# Patient Record
Sex: MALE | Race: WHITE | Employment: UNEMPLOYED | URBAN - METROPOLITAN AREA
[De-identification: names, ages, dates, MRNs, and addresses within clinical notes are randomized per-mention and may not be internally consistent; named-entity substitution may affect disease eponyms.]

---

## 2017-02-03 ENCOUNTER — IP HISTORICAL/CONVERTED ENCOUNTER (OUTPATIENT)
Dept: OTHER | Age: 22
End: 2017-02-03

## 2017-03-14 ENCOUNTER — IP HISTORICAL/CONVERTED ENCOUNTER (OUTPATIENT)
Dept: OTHER | Age: 22
End: 2017-03-14

## 2017-06-08 ENCOUNTER — OP HISTORICAL/CONVERTED ENCOUNTER (OUTPATIENT)
Dept: OTHER | Age: 22
End: 2017-06-08

## 2017-10-31 ENCOUNTER — OFFICE VISIT (OUTPATIENT)
Dept: INTERNAL MEDICINE CLINIC | Age: 22
End: 2017-10-31

## 2017-10-31 VITALS
HEART RATE: 94 BPM | WEIGHT: 164.4 LBS | BODY MASS INDEX: 24.35 KG/M2 | HEIGHT: 69 IN | DIASTOLIC BLOOD PRESSURE: 84 MMHG | TEMPERATURE: 98.8 F | OXYGEN SATURATION: 94 % | SYSTOLIC BLOOD PRESSURE: 127 MMHG | RESPIRATION RATE: 20 BRPM

## 2017-10-31 DIAGNOSIS — J02.9 SORE THROAT: Primary | ICD-10-CM

## 2017-10-31 DIAGNOSIS — J40 BRONCHITIS: ICD-10-CM

## 2017-10-31 DIAGNOSIS — Z87.891 EX-SMOKER FOR MORE THAN 1 YEAR: ICD-10-CM

## 2017-10-31 DIAGNOSIS — F20.9 SCHIZOPHRENIA, UNSPECIFIED TYPE (HCC): ICD-10-CM

## 2017-10-31 RX ORDER — AZITHROMYCIN 250 MG/1
TABLET, FILM COATED ORAL
Qty: 6 TAB | Refills: 0 | Status: SHIPPED | OUTPATIENT
Start: 2017-10-31 | End: 2017-11-05

## 2017-10-31 RX ORDER — BENZONATATE 200 MG/1
200 CAPSULE ORAL
Qty: 30 CAP | Refills: 0 | Status: SHIPPED | OUTPATIENT
Start: 2017-10-31 | End: 2017-11-07

## 2017-10-31 NOTE — PROGRESS NOTES
HISTORY OF PRESENT ILLNESS  Saundra Trevino is a 25 y.o. male group home patient presents with care giver for problem visit  HPI  He reports cough for a \"a couple months\" yellow sputum, sore throat    No asthma inhaler needed since 11years old. Denies wheezing    No history of pneumonia    Stopped smoking earlier this month, was 1 ppd since 12years old    Schizophrenia managed by  Dr. Tee Ewing, last visit 3 weeks ago. Admitted to [facility] in July    heis anxious today because he will be leaving to spend holidays with mother in [place]    Past Medical History:   Diagnosis Date    Asthma     Seizures ([address]     last seizure 2001       No current outpatient prescriptions on file prior to visit. No current facility-administered medications on file prior to visit.    medications not yet entered     Review of Systems   Constitutional: Negative for chills and fever. HENT: Positive for sore throat. Eyes: Negative. Respiratory: Positive for cough and sputum production. Negative for hemoptysis, shortness of breath and wheezing. Cardiovascular: Negative. Gastrointestinal: Negative. Genitourinary: Negative. Musculoskeletal: Negative for myalgias. Physical Exam   Constitutional: He appears well-developed and well-nourished. No distress. HENT:   Right Ear: External ear normal.   Left Ear: External ear normal.   Mouth/Throat: Oropharynx is clear and moist. No oropharyngeal exudate. Eyes: Conjunctivae are normal. Right eye exhibits no discharge. Left eye exhibits no discharge. No scleral icterus. Neck: Normal range of motion. Neck supple. Cardiovascular: Normal rate and regular rhythm. Pulmonary/Chest: Effort normal. No respiratory distress. He has no decreased breath sounds. He has no wheezes. He has rhonchi in the right upper field, the right middle field and the right lower field. He has no rales. He exhibits no tenderness.    Lymphadenopathy:     He has no cervical adenopathy. Skin: He is not diaphoretic. Psychiatric: Judgment and thought content normal. His mood appears anxious. His affect is angry and inappropriate. His speech is rapid and/or pressured. He is aggressive. Cognition and memory are normal.       ASSESSMENT and PLAN    ICD-10-CM ICD-9-CM    1. Sore throat J02.9 462 AMB POC RAPID STREP A      azithromycin (ZITHROMAX) 250 mg tablet   2. Bronchitis J40 490 azithromycin (ZITHROMAX) 250 mg tablet      benzonatate (TESSALON) 200 mg capsule      XR CHEST PA LAT   3. Schizophrenia, unspecified type ([facility] F20.9 295.90    4. Ex-smoker for more than 1 year Z87.891 V15.82 XR CHEST PA LAT     Follow-up Disposition:  Return in about 2 weeks (around 11/14/2017) for bronchitis, sore thorat. reviewed medications and side effects in detail      1. Patient declined rapid strep, encouraged to gargle    2.  Encouraged to increase hydration

## 2017-10-31 NOTE — PATIENT INSTRUCTIONS
Bronchitis: Care Instructions  Your Care Instructions    Bronchitis is inflammation of the bronchial tubes, which carry air to the lungs. The tubes swell and produce mucus, or phlegm. The mucus and inflamed bronchial tubes make you cough. You may have trouble breathing. Most cases of bronchitis are caused by viruses like those that cause colds. Antibiotics usually do not help and they may be harmful. Bronchitis usually develops rapidly and lasts about 2 to 3 weeks in otherwise healthy people. Follow-up care is a key part of your treatment and safety. Be sure to make and go to all appointments, and call your doctor if you are having problems. It's also a good idea to know your test results and keep a list of the medicines you take. How can you care for yourself at home? · Take all medicines exactly as prescribed. Call your doctor if you think you are having a problem with your medicine. · Get some extra rest.  · Take an over-the-counter pain medicine, such as acetaminophen (Tylenol), ibuprofen (Advil, Motrin), or naproxen (Aleve) to reduce fever and relieve body aches. Read and follow all instructions on the label. · Do not take two or more pain medicines at the same time unless the doctor told you to. Many pain medicines have acetaminophen, which is Tylenol. Too much acetaminophen (Tylenol) can be harmful. · Take an over-the-counter cough medicine that contains dextromethorphan to help quiet a dry, hacking cough so that you can sleep. Avoid cough medicines that have more than one active ingredient. Read and follow all instructions on the label. · Breathe moist air from a humidifier, hot shower, or sink filled with hot water. The heat and moisture will thin mucus so you can cough it out. · Do not smoke. Smoking can make bronchitis worse. If you need help quitting, talk to your doctor about stop-smoking programs and medicines. These can increase your chances of quitting for good.   When should you call for help? Call 911 anytime you think you may need emergency care. For example, call if:  ? · You have severe trouble breathing. ?Call your doctor now or seek immediate medical care if:  ? · You have new or worse trouble breathing. ? · You cough up dark brown or bloody mucus (sputum). ? · You have a new or higher fever. ? · You have a new rash. ? Watch closely for changes in your health, and be sure to contact your doctor if:  ? · You cough more deeply or more often, especially if you notice more mucus or a change in the color of your mucus. ? · You are not getting better as expected. Where can you learn more? Go to http://genna-chester.info/. Enter H333 in the search box to learn more about \"Bronchitis: Care Instructions. \"  Current as of: May 12, 2017  Content Version: 11.4  © 3759-6872 [employer]. Care instructions adapted under license by [employer] (which disclaims liability or warranty for this information). If you have questions about a medical condition or this instruction, always ask your healthcare professional. [place] [address] any warranty or liability for your use of this information. Sore Throat: Care Instructions  Your Care Instructions    Infection by bacteria or a virus causes most sore throats. Cigarette smoke, dry air, air pollution, allergies, and yelling can also cause a sore throat. Sore throats can be painful and annoying. Fortunately, most sore throats go away on their own. If you have a bacterial infection, your doctor may prescribe antibiotics. Follow-up care is a key part of your treatment and safety. Be sure to make and go to all appointments, and call your doctor if you are having problems. It's also a good idea to know your test results and keep a list of the medicines you take. How can you care for yourself at home? · If your doctor prescribed antibiotics, take them as directed.  Do not stop taking them just because you feel better. You need to take the full course of antibiotics. · Gargle with warm salt water once an hour to help reduce swelling and relieve discomfort. Use 1 teaspoon of salt mixed in 1 cup of warm water. · Take an over-the-counter pain medicine, such as acetaminophen (Tylenol), ibuprofen (Advil, Motrin), or naproxen (Aleve). Read and follow all instructions on the label. · Be careful when taking over-the-counter cold or flu medicines and Tylenol at the same time. Many of these medicines have acetaminophen, which is Tylenol. Read the labels to make sure that you are not taking more than the recommended dose. Too much acetaminophen (Tylenol) can be harmful. · Drink plenty of fluids. Fluids may help soothe an irritated throat. Hot fluids, such as tea or soup, may help decrease throat pain. · Use over-the-counter throat lozenges to soothe pain. Regular cough drops or hard candy may also help. These should not be given to young children because of the risk of choking. · Do not smoke or allow others to smoke around you. If you need help quitting, talk to your doctor about stop-smoking programs and medicines. These can increase your chances of quitting for good. · Use a vaporizer or humidifier to add moisture to your bedroom. Follow the directions for cleaning the machine. When should you call for help? Call your doctor now or seek immediate medical care if:  ? · You have new or worse trouble swallowing. ? · Your sore throat gets much worse on one side. ? Watch closely for changes in your health, and be sure to contact your doctor if you do not get better as expected. Where can you learn more? Go to http://genna-chester.info/. Enter [id number] [id number] in the search box to learn more about \"Sore Throat: Care Instructions. \"  Current as of: May 12, 2017  Content Version: 11.4  © 4338-9188 Healthwise, Incorporated.  Care instructions adapted under license by [employer] (which disclaims liability or warranty for this information). If you have questions about a medical condition or this instruction, always ask your healthcare professional. [address] any warranty or liability for your use of this information.

## 2017-10-31 NOTE — PROGRESS NOTES
RM # 9    Chief Complaint   Patient presents with    New Patient    Cough    Sore Throat     PHQ over the last two weeks 10/31/2017   Little interest or pleasure in doing things Not at all   Feeling down, depressed or hopeless Not at all   Total Score PHQ 2 0     Learning Assessment 10/31/2017   PRIMARY LEARNER Patient   HIGHEST LEVEL OF EDUCATION - PRIMARY LEARNER  GRADUATED HIGH SCHOOL OR GED   BARRIERS PRIMARY LEARNER NONE   CO-LEARNER CAREGIVER No   PRIMARY LANGUAGE ENGLISH   LEARNER PREFERENCE PRIMARY READING   ANSWERED BY patient   RELATIONSHIP SELF

## 2017-10-31 NOTE — MR AVS SNAPSHOT
Visit Information Date & Time Provider Department Dept. Phone Encounter #  
 10/31/2017 10:30 AM Luz Maria Hernandez NP [facility] Pediatrics and Internal Medicine [phone] [id number] Follow-up Instructions Return in about 2 weeks (around 11/14/2017) for bronchitis, sore thorat. Upcoming Health Maintenance Date Due DTaP/Tdap/Td series (1 - Tdap) 4/28/2016 INFLUENZA AGE 9 TO ADULT 8/1/2017 Allergies as of 10/31/2017  Review Complete On: 10/31/2017 By: Luz Maria Hernandez NP Severity Noted Reaction Type Reactions Bee Sting [Sting, Bee]  10/31/2017    Sneezing Coconut Oil  10/31/2017    Hives Mushroom  10/31/2017    Nausea and Vomiting Current Immunizations  Never Reviewed No immunizations on file. Not reviewed this visit You Were Diagnosed With   
  
 Codes Comments Sore throat    -  Primary ICD-10-CM: J02.9 ICD-9-CM: 153 Bronchitis     ICD-10-CM: J40 ICD-9-CM: 560 Schizophrenia, unspecified type ([facility]     ICD-10-CM: F20.9 ICD-9-CM: 295.90 Ex-smoker for more than 1 year     ICD-10-CM: Z87.891 ICD-9-CM: V15.82 Vitals BP Pulse Temp Resp Height(growth percentile) Weight(growth percentile) 127/84 (BP 1 Location: Left arm, BP Patient Position: Sitting) 94 98.8 °F (37.1 °C) (Oral) 20 5' 9\" (1.753 m) 164 lb 6.4 oz (74.6 kg) SpO2 BMI Smoking Status 94% 24.28 kg/m2 Former Smoker Vitals History BMI and BSA Data Body Mass Index Body Surface Area  
 24.28 kg/m 2 1.91 m 2 Preferred Pharmacy Pharmacy Name Phone [facility] [address], [address] [phone] Your Updated Medication List  
  
   
This list is accurate as of: 10/31/17 11:45 AM.  Always use your most recent med list.  
  
  
  
  
 azithromycin 250 mg tablet Commonly known as:  Boirs Edwards Take 2 tablets today, then take 1 tablet daily  
  
 benzonatate 200 mg capsule Commonly known as:  TESSALON Take 1 Cap by mouth three (3) times daily as needed for Cough for up to 7 days. Prescriptions Sent to Pharmacy Refills  
 azithromycin (ZITHROMAX) 250 mg tablet 0 Sig: Take 2 tablets today, then take 1 tablet daily Class: Normal  
 Pharmacy: [address] [place] [address], [facility] [address]  Ph #: [phone]  
 benzonatate (TESSALON) 200 mg capsule 0 Sig: Take 1 Cap by mouth three (3) times daily as needed for Cough for up to 7 days. Class: Normal  
 Pharmacy: [address] [place] [address], [address] [place] [address] Jaime Ph #: [phone] Route: Oral  
  
We Performed the Following AMB POC RAPID STREP A [07301 CPT(R)] Follow-up Instructions Return in about 2 weeks (around 11/14/2017) for bronchitis, sore thorat. To-Do List   
 10/31/2017 Imaging:  XR CHEST PA LAT Patient Instructions Bronchitis: Care Instructions Your Care Instructions Bronchitis is inflammation of the bronchial tubes, which carry air to the lungs. The tubes swell and produce mucus, or phlegm. The mucus and inflamed bronchial tubes make you cough. You may have trouble breathing. Most cases of bronchitis are caused by viruses like those that cause colds. Antibiotics usually do not help and they may be harmful. Bronchitis usually develops rapidly and lasts about 2 to 3 weeks in otherwise healthy people. Follow-up care is a key part of your treatment and safety. Be sure to make and go to all appointments, and call your doctor if you are having problems. It's also a good idea to know your test results and keep a list of the medicines you take. How can you care for yourself at home? · Take all medicines exactly as prescribed. Call your doctor if you think you are having a problem with your medicine.  
· Get some extra rest. 
· Take an over-the-counter pain medicine, such as acetaminophen (Tylenol), ibuprofen (Advil, Motrin), or naproxen (Aleve) to reduce fever and relieve body aches. Read and follow all instructions on the label. · Do not take two or more pain medicines at the same time unless the doctor told you to. Many pain medicines have acetaminophen, which is Tylenol. Too much acetaminophen (Tylenol) can be harmful. · Take an over-the-counter cough medicine that contains dextromethorphan to help quiet a dry, hacking cough so that you can sleep. Avoid cough medicines that have more than one active ingredient. Read and follow all instructions on the label. · Breathe moist air from a humidifier, hot shower, or sink filled with hot water. The heat and moisture will thin mucus so you can cough it out. · Do not smoke. Smoking can make bronchitis worse. If you need help quitting, talk to your doctor about stop-smoking programs and medicines. These can increase your chances of quitting for good. When should you call for help? Call 911 anytime you think you may need emergency care. For example, call if: 
? · You have severe trouble breathing. ?Call your doctor now or seek immediate medical care if: 
? · You have new or worse trouble breathing. ? · You cough up dark brown or bloody mucus (sputum). ? · You have a new or higher fever. ? · You have a new rash. ? Watch closely for changes in your health, and be sure to contact your doctor if: 
? · You cough more deeply or more often, especially if you notice more mucus or a change in the color of your mucus. ? · You are not getting better as expected. Where can you learn more? Go to http://genna-chester.info/. Enter H333 in the search box to learn more about \"Bronchitis: Care Instructions. \" Current as of: May 12, 2017 Content Version: 11.4 © 9782-0263 Healthwise, [employer].  Care instructions adapted under license by "[employer]." (which disclaims liability or warranty for this information). If you have questions about a medical condition or this instruction, always ask your healthcare professional. [address] any warranty or liability for your use of this information. Sore Throat: Care Instructions Your Care Instructions Infection by bacteria or a virus causes most sore throats. Cigarette smoke, dry air, air pollution, allergies, and yelling can also cause a sore throat. Sore throats can be painful and annoying. Fortunately, most sore throats go away on their own. If you have a bacterial infection, your doctor may prescribe antibiotics. Follow-up care is a key part of your treatment and safety. Be sure to make and go to all appointments, and call your doctor if you are having problems. It's also a good idea to know your test results and keep a list of the medicines you take. How can you care for yourself at home? · If your doctor prescribed antibiotics, take them as directed. Do not stop taking them just because you feel better. You need to take the full course of antibiotics. · Gargle with warm salt water once an hour to help reduce swelling and relieve discomfort. Use 1 teaspoon of salt mixed in 1 cup of warm water. · Take an over-the-counter pain medicine, such as acetaminophen (Tylenol), ibuprofen (Advil, Motrin), or naproxen (Aleve). Read and follow all instructions on the label. · Be careful when taking over-the-counter cold or flu medicines and Tylenol at the same time. Many of these medicines have acetaminophen, which is Tylenol. Read the labels to make sure that you are not taking more than the recommended dose. Too much acetaminophen (Tylenol) can be harmful. · Drink plenty of fluids. Fluids may help soothe an irritated throat. Hot fluids, such as tea or soup, may help decrease throat pain. · Use over-the-counter throat lozenges to soothe pain.  Regular cough drops or hard candy may also help. These should not be given to young children because of the risk of choking. · Do not smoke or allow others to smoke around you. If you need help quitting, talk to your doctor about stop-smoking programs and medicines. These can increase your chances of quitting for good. · Use a vaporizer or humidifier to add moisture to your bedroom. Follow the directions for cleaning the machine. When should you call for help? Call your doctor now or seek immediate medical care if: 
? · You have new or worse trouble swallowing. ? · Your sore throat gets much worse on one side. ? Watch closely for changes in your health, and be sure to contact your doctor if you do not get better as expected. Where can you learn more? Go to http://genna-chester.info/. Enter [id number] [id number] in the search box to learn more about \"Sore Throat: Care Instructions. \" Current as of: May 12, 2017 Content Version: 11.4 © 2836-5467 [employer]. Care instructions adapted under license by [employer]ounge (which disclaims liability or warranty for this information). If you have questions about a medical condition or this instruction, always ask your healthcare professional. [place] [address] any warranty or liability for your use of this information. Introducing [facility]! Penny Santos introduces Ku patient portal. Now you can access parts of your medical record, email your doctor's office, and request medication refills online. 1. In your internet browser, go to https://[employer]. [employer] 2. Click on the First Time User? Click Here link in the Sign In box. You will see the New Member Sign Up page. 3. Enter your Ku Access Code exactly as it appears below. You will not need to use this code after youve completed the sign-up process. If you do not sign up before the expiration date, you must request a new code. · Charge Payment Access Code: [email or web address] Expires: 1/29/2018  9:58 AM 
 
4. Enter the last four digits of your Social Security Number (xxxx) and Date of Birth (mm/dd/yyyy) as indicated and click Submit. You will be taken to the next sign-up page. 5. Create a Charge Payment ID. This will be your Charge Payment login ID and cannot be changed, so think of one that is secure and easy to remember. 6. Create a Charge Payment password. You can change your password at any time. 7. Enter your Password Reset Question and Answer. This can be used at a later time if you forget your password. 8. Enter your e-mail address. You will receive e-mail notification when new information is available in [address] 9. Click Sign Up. You can now view and download portions of your medical record. 10. Click the Download Summary menu link to download a portable copy of your medical information. If you have questions, please visit the Frequently Asked Questions section of the Charge Payment website. Remember, Charge Payment is NOT to be used for urgent needs. For medical emergencies, dial 911. Now available from your iPhone and Android! Please provide this summary of care documentation to your next provider. If you have any questions after today's visit, please call [phone].

## 2018-02-28 ENCOUNTER — HOSPITAL ENCOUNTER (EMERGENCY)
Age: 23
Discharge: HOME | End: 2018-02-28
Attending: FAMILY MEDICINE

## 2018-02-28 VITALS
HEART RATE: 104 BPM | RESPIRATION RATE: 16 BRPM | SYSTOLIC BLOOD PRESSURE: 126 MMHG | HEIGHT: 69 IN | BODY MASS INDEX: 23.55 KG/M2 | OXYGEN SATURATION: 97 % | WEIGHT: 159 LBS | DIASTOLIC BLOOD PRESSURE: 83 MMHG | TEMPERATURE: 97.8 F

## 2018-02-28 DIAGNOSIS — L03.115 CELLULITIS OF RIGHT LOWER EXTREMITY: Primary | ICD-10-CM

## 2018-02-28 RX ORDER — CEFTRIAXONE 1 G/1
1 INJECTION, POWDER, FOR SOLUTION INTRAMUSCULAR; INTRAVENOUS EVERY 24 HOURS
Status: DISCONTINUED | OUTPATIENT
Start: 2018-02-28 | End: 2018-02-28

## 2018-02-28 RX ORDER — SULFAMETHOXAZOLE AND TRIMETHOPRIM 800; 160 MG/1; MG/1
1 TABLET ORAL 2 TIMES DAILY
Qty: 20 TAB | Refills: 0 | Status: SHIPPED | OUTPATIENT
Start: 2018-02-28 | End: 2018-03-06 | Stop reason: DRUGHIGH

## 2018-02-28 RX ORDER — CEFTRIAXONE 1 G/1
1 INJECTION, POWDER, FOR SOLUTION INTRAMUSCULAR; INTRAVENOUS ONCE
Status: COMPLETED | OUTPATIENT
Start: 2018-02-28 | End: 2018-02-28

## 2018-02-28 RX ADMIN — CEFTRIAXONE 1 G: 1 INJECTION, POWDER, FOR SOLUTION INTRAMUSCULAR; INTRAVENOUS at 09:00

## 2018-02-28 NOTE — DISCHARGE INSTRUCTIONS
Cellulitis: Care Instructions  Your Care Instructions    Cellulitis is a skin infection. It often occurs after a break in the skin from a scrape, cut, bite, or puncture, or after a rash. The doctor has checked you carefully, but problems can develop later. If you notice any problems or new symptoms, get medical treatment right away. Follow-up care is a key part of your treatment and safety. Be sure to make and go to all appointments, and call your doctor if you are having problems. It's also a good idea to know your test results and keep a list of the medicines you take. How can you care for yourself at home? · Take your antibiotics as directed. Do not stop taking them just because you feel better. You need to take the full course of antibiotics. · Prop up the infected area on pillows to reduce pain and swelling. Try to keep the area above the level of your heart as often as you can. · If your doctor told you how to care for your wound, follow your doctor's instructions. If you did not get instructions, follow this general advice:  ¨ Wash the wound with clean water 2 times a day. Don't use hydrogen peroxide or alcohol, which can slow healing. ¨ You may cover the wound with a thin layer of petroleum jelly, such as Vaseline, and a nonstick bandage. ¨ Apply more petroleum jelly and replace the bandage as needed. · Be safe with medicines. Take pain medicines exactly as directed. ¨ If the doctor gave you a prescription medicine for pain, take it as prescribed. ¨ If you are not taking a prescription pain medicine, ask your doctor if you can take an over-the-counter medicine. To prevent cellulitis in the future  · Try to prevent cuts, scrapes, or other injuries to your skin. Cellulitis most often occurs where there is a break in the skin. · If you get a scrape, cut, mild burn, or bite, wash the wound with clean water as soon as you can to help avoid infection.  Don't use hydrogen peroxide or alcohol, which can slow healing. · If you have swelling in your legs (edema), support stockings and good skin care may help prevent leg sores and cellulitis. · Take care of your feet, especially if you have diabetes or other conditions that increase the risk of infection. Wear shoes and socks. Do not go barefoot. If you have athlete's foot or other skin problems on your feet, talk to your doctor about how to treat them. When should you call for help? Call your doctor now or seek immediate medical care if:  ? · You have signs that your infection is getting worse, such as:  ¨ Increased pain, swelling, warmth, or redness. ¨ Red streaks leading from the area. ¨ Pus draining from the area. ¨ A fever. ? · You get a rash. ? Watch closely for changes in your health, and be sure to contact your doctor if:  ? · You are not getting better after 1 day (24 hours). ? · You do not get better as expected. Where can you learn more? Go to http://genna-chester.info/. Alejandro Saenz in the search box to learn more about \"Cellulitis: Care Instructions. \"  Current as of: October 13, 2016  Content Version: 11.4  © 5766-8943 [employer]. Care instructions adapted under license by [employer] (which disclaims liability or warranty for this information). If you have questions about a medical condition or this instruction, always ask your healthcare professional. [place] [address] any warranty or liability for your use of this information.

## 2018-03-05 NOTE — UC PROVIDER NOTE
Patient is a 25 y.o. male presenting with abscess. The history is provided by the patient. Abscess    The current episode started 2 days ago. The problem has not changed since onset. The problem is associated with an unknown factor. There has been no fever. The rash is present on the right lower leg. The pain is at a severity of 7/10. Associated symptoms include pain. He has tried nothing for the symptoms. Past Medical History:   Diagnosis Date    Asthma     Seizures ([address]     last seizure 2001        No past surgical history on file. No family history on file. Social History     Social History    Marital status: SINGLE     Spouse name: N/A    Number of children: N/A    Years of education: N/A     Occupational History    Not on file. Social History Main Topics    Smoking status: Former Smoker     Packs/day: 1.00     Quit date: 10/31/2016    Smokeless tobacco: Never Used    Alcohol use 0.6 - 1.2 oz/week     1 - 2 Shots of liquor per week      Comment: per week    Drug use: Yes     Special: Marijuana    Sexual activity: Yes     Partners: Female     Birth control/ protection: None     Other Topics Concern    Not on file     Social History Narrative    No narrative on file                ALLERGIES: Bee sting [sting, bee]; Coconut oil; and Mushroom    Review of Systems   Constitutional: Negative for activity change and fever. Musculoskeletal: Negative for arthralgias. Skin: Positive for color change. Vitals:    02/28/18 0831   BP: 126/83   Pulse: (!) 104   Resp: 16   Temp: 97.8 °F (36.6 °C)   SpO2: 97%   Weight: 72.1 kg (159 lb)   Height: 5' 9\" (1.753 m)       Physical Exam   Constitutional: He is oriented to person, place, and time. He appears well-developed and well-nourished. Eyes: Conjunctivae and EOM are normal.   Pulmonary/Chest: Effort normal.   Musculoskeletal: Normal range of motion.    Area of erythema on right knee   Neurological: He is alert and oriented to person, place, and time. Skin: Skin is warm and dry. Psychiatric: He has a normal mood and affect. His behavior is normal. Judgment and thought content normal.   Nursing note and vitals reviewed. MDM     Differential Diagnosis; Clinical Impression; Plan:     CLINICAL IMPRESSION:  Cellulitis of right lower extremity  (primary encounter diagnosis)    Plan:  1. Bactrim DS  2.   3.   Risk of Significant Complications, Morbidity, and/or Mortality:   Presenting problems: Moderate  Diagnostic procedures: Moderate  Management options:   Moderate  Progress:   Patient progress:  Stable      Procedures

## 2018-03-06 ENCOUNTER — OFFICE VISIT (OUTPATIENT)
Dept: INTERNAL MEDICINE CLINIC | Age: 23
End: 2018-03-06

## 2018-03-06 VITALS
RESPIRATION RATE: 18 BRPM | SYSTOLIC BLOOD PRESSURE: 116 MMHG | OXYGEN SATURATION: 98 % | WEIGHT: 160.8 LBS | BODY MASS INDEX: 23.82 KG/M2 | HEART RATE: 95 BPM | HEIGHT: 69 IN | DIASTOLIC BLOOD PRESSURE: 81 MMHG | TEMPERATURE: 97.8 F

## 2018-03-06 DIAGNOSIS — L02.415 ABSCESS OF RIGHT THIGH: Primary | ICD-10-CM

## 2018-03-06 RX ORDER — CHLORPROMAZINE HYDROCHLORIDE 100 MG/1
100 TABLET, FILM COATED ORAL
COMMUNITY
End: 2019-09-19

## 2018-03-06 RX ORDER — LORAZEPAM 2 MG/1
TABLET ORAL
COMMUNITY
End: 2019-09-19 | Stop reason: DRUGHIGH

## 2018-03-06 RX ORDER — DIVALPROEX SODIUM 500 MG/1
TABLET, DELAYED RELEASE ORAL 3 TIMES DAILY
COMMUNITY
End: 2019-09-25

## 2018-03-06 RX ORDER — ACETAMINOPHEN 325 MG/1
TABLET ORAL
COMMUNITY
End: 2019-07-03 | Stop reason: SDUPTHER

## 2018-03-06 RX ORDER — SULFAMETHOXAZOLE AND TRIMETHOPRIM 800; 160 MG/1; MG/1
2 TABLET ORAL 2 TIMES DAILY
Qty: 40 TAB | Refills: 0 | Status: SHIPPED | OUTPATIENT
Start: 2018-03-06 | End: 2018-03-16

## 2018-03-06 RX ORDER — ATOMOXETINE 100 MG/1
100 CAPSULE ORAL DAILY
COMMUNITY
End: 2021-06-01

## 2018-03-06 RX ORDER — QUETIAPINE FUMARATE 200 MG/1
300 TABLET, FILM COATED ORAL 3 TIMES DAILY
COMMUNITY
End: 2019-07-03

## 2018-03-06 RX ORDER — DOCUSATE SODIUM 100 MG/1
100 CAPSULE, LIQUID FILLED ORAL 2 TIMES DAILY
COMMUNITY
End: 2019-09-19

## 2018-03-06 NOTE — MR AVS SNAPSHOT
[address] [facility] [address] 
[phone] Patient: Nargis Vance MRN: [id number] :1995 Visit Information Date & Time Provider Department Dept. Phone Encounter #  
 3/6/2018 11:45 AM Juanis Raymond, [address] [place] [address] and Internal Medicine [phone] [id number] Follow-up Instructions Return if symptoms worsen or fail to improve. Upcoming Health Maintenance Date Due DTaP/Tdap/Td series (1 - Tdap) 2016 Influenza Age 5 to Adult 2017 Allergies as of 3/6/2018  Review Complete On: 3/6/2018 By: [place] Severity Noted Reaction Type Reactions Bee Sting [Sting, Bee]  10/31/2017    Sneezing Coconut Oil  10/31/2017    Hives Mushroom  10/31/2017    Nausea and Vomiting Current Immunizations  Never Reviewed No immunizations on file. Not reviewed this visit You Were Diagnosed With   
  
 Codes Comments Abscess of right thigh    -  Primary ICD-10-CM: V78.446 ICD-9-CM: 787. 6 Vitals BP Pulse Temp Resp Height(growth percentile) Weight(growth percentile) 116/81 (BP 1 Location: Right arm, BP Patient Position: Sitting) 95 97.8 °F (36.6 °C) (Oral) 18 5' 9\" (1.753 m) 160 lb 12.8 oz (72.9 kg) SpO2 BMI Smoking Status 98% 23.75 kg/m2 Current Every Day Smoker BMI and BSA Data Body Mass Index Body Surface Area  
 23.75 kg/m 2 1.88 m 2 Preferred Pharmacy Pharmacy Name Phone [address] [place] [address] [phone] Your Updated Medication List  
  
   
This list is accurate as of 3/6/18 12:59 PM.  Always use your most recent med list.  
  
  
  
  
 acetaminophen 325 mg tablet Commonly known as:  TYLENOL Take  by mouth every four (4) hours as needed for Pain. atomoxetine 100 mg capsule Commonly known as:  STRATTERA Take 100 mg by mouth daily. chlorproMAZINE 100 mg tablet Commonly known as:  THORAZINE Take 100 mg by mouth three (3) times daily. divalproex  mg tablet Commonly known as:  DEPAKOTE Take  by mouth three (3) times daily. docusate sodium 100 mg capsule Commonly known as:  Aydee Finner Take 100 mg by mouth two (2) times a day. LORazepam 2 mg tablet Commonly known as:  ATIVAN Take  by mouth every six (6) hours as needed for Anxiety. QUEtiapine 200 mg tablet Commonly known as:  SEROquel Take 200 mg by mouth two (2) times a day. trimethoprim-sulfamethoxazole 160-800 mg per tablet Commonly known as:  BACTRIM DS, SEPTRA DS Take 2 Tabs by mouth two (2) times a day for 10 days. Prescriptions Printed Refills  
 trimethoprim-sulfamethoxazole (BACTRIM DS, SEPTRA DS) 160-800 mg per tablet 0 Sig: Take 2 Tabs by mouth two (2) times a day for 10 days. Class: Print Route: Oral  
  
We Performed the Following REFERRAL TO GENERAL SURGERY [REF27 Custom] Comments:  
 Please evaluate for drainage, and packing right thigh abscess. See by tomorrow, 3/7. Follow-up Instructions Return if symptoms worsen or fail to improve. Referral Information Referral ID Referred By Referred To  
  
 [id number] Shelby Chung MD   
   [facility] [address] [place] [address] Phone: [phone] Fax: [phone] Visits Status Start Date End Date 1 New Request 3/6/18 3/6/19 If your referral has a status of pending review or denied, additional information will be sent to support the outcome of this decision. Patient Instructions 1. Please review with quetiapine prior authorization with his mental/behavioral health provider. 2.  Will schedule with general surgery with [facility] [place] or [facility] for tomorrow to drain abscess as reveiwed. Until seen, increase the Bactrim to 2 tabs two times daily. You can fill the printed script if needed, if not changed once seen by surgery. Surgery can culture once drained to better adjust the antibiotic as reviewed. Introducing [facility] & HE[facility] SERVICES! [facility] introduces Echograph patient portal. Now you can access parts of your medical record, email your doctor's office, and request medication refills online. 1. In your internet browser, go to [email or web address] 2. Click on the First Time User? Click Here link in the Sign In box. You will see the New Member Sign Up page. 3. Enter your Echograph Access Code exactly as it appears below. You will not need to use this code after youve completed the sign-up process. If you do not sign up before the expiration date, you must request a new code. · Echograph Access Code: [email or web address] Expires: 5/29/2018  8:42 AM 
 
4. Enter the last four digits of your Social Security Number (xxxx) and Date of Birth (mm/dd/yyyy) as indicated and click Submit. You will be taken to the next sign-up page. 5. Create a Echograph ID. This will be your Echograph login ID and cannot be changed, so think of one that is secure and easy to remember. 6. Create a Echograph password. You can change your password at any time. 7. Enter your Password Reset Question and Answer. This can be used at a later time if you forget your password. 8. Enter your e-mail address. You will receive e-mail notification when new information is available in [address] 9. Click Sign Up. You can now view and download portions of your medical record. 10. Click the Download Summary menu link to download a portable copy of your medical information. If you have questions, please visit the Frequently Asked Questions section of the Echograph website. Remember, Echograph is NOT to be used for urgent needs. For medical emergencies, dial 911. Now available from your iPhone and Android! Please provide this summary of care documentation to your next provider. Your primary care clinician is listed as NONE. If you have any questions after today's visit, please call [phone].

## 2018-03-06 NOTE — PATIENT INSTRUCTIONS
1.  Please review with quetiapine prior authorization with his mental/behavioral health provider. 2.  Will schedule with general surgery with [facility] or [address] for tomorrow to drain abscess as reveiwed. Until seen, increase the Bactrim to 2 tabs two times daily. You can fill the printed script if needed, if not changed once seen by surgery. Surgery can culture once drained to better adjust the antibiotic as reviewed.

## 2018-03-06 NOTE — PROGRESS NOTES
Chief Complaint   Patient presents with   [facility] Follow Up     2/28/18, patient urgent care, cellulitis of right lower extremity, 5 right knee, red [place] around the cyst, pt stated it itch, pt was given medication to help with infection      1. Have you been to the ER, urgent care clinic since your last visit? Hospitalized since your last visit? Yes, 2/28/18, patient urgent care, cellulitis of right lower extremity    2. Have you seen or consulted any other health care providers outside of the [address] [place] [address] since your last visit? Include any pap smears or colon screening.  No

## 2018-03-07 ENCOUNTER — OFFICE VISIT (OUTPATIENT)
Dept: SURGERY | Age: 23
End: 2018-03-07

## 2018-03-07 VITALS
DIASTOLIC BLOOD PRESSURE: 80 MMHG | WEIGHT: 160 LBS | HEART RATE: 91 BPM | RESPIRATION RATE: 18 BRPM | TEMPERATURE: 98.3 F | SYSTOLIC BLOOD PRESSURE: 124 MMHG | BODY MASS INDEX: 23.7 KG/M2 | HEIGHT: 69 IN | OXYGEN SATURATION: 97 %

## 2018-03-07 DIAGNOSIS — L08.9 INFECTED SEBACEOUS CYST OF SKIN: Primary | ICD-10-CM

## 2018-03-07 DIAGNOSIS — L72.3 INFECTED SEBACEOUS CYST OF SKIN: Primary | ICD-10-CM

## 2018-03-07 NOTE — MR AVS SNAPSHOT
[address] [place] [id number] [phone] Patient: Walter Farah MRN: [id number] :1995 Visit Information Date & Time Provider Department Dept. Phone Encounter #  
 3/7/2018  9:40 AM MD Maranda Dickerson [phone] [id number] Follow-up Instructions Return in about 2 weeks (around 3/21/2018). Routing History Upcoming Health Maintenance Date Due Pneumococcal 19-64 Medium Risk (1 of 1 - PPSV23) 2014 DTaP/Tdap/Td series (1 - Tdap) 2016 Influenza Age 5 to Adult 2017 Allergies as of 3/7/2018  Review Complete On: 3/7/2018 By: Manuel Quinonez LPN Severity Noted Reaction Type Reactions Bee Sting [Sting, Bee]  10/31/2017    Sneezing Coconut Oil  10/31/2017    Hives Mushroom  10/31/2017    Nausea and Vomiting Current Immunizations  Never Reviewed No immunizations on file. Not reviewed this visit You Were Diagnosed With   
  
 Codes Comments Infected sebaceous cyst of skin    -  Primary ICD-10-CM: L72.3, L08.9 ICD-9-CM: 706. 2 Vitals BP Pulse Temp Resp Height(growth percentile) Weight(growth percentile) 124/80 91 98.3 °F (36.8 °C) 18 5' 9\" (1.753 m) 160 lb (72.6 kg) SpO2 BMI Smoking Status 97% 23.63 kg/m2 Current Every Day Smoker Vitals History BMI and BSA Data Body Mass Index Body Surface Area  
 23.63 kg/m 2 1.88 m 2 Preferred Pharmacy Pharmacy Name Phone [address], [address] [phone] Your Updated Medication List  
  
   
This list is accurate as of 3/7/18 11:59 PM.  Always use your most recent med list.  
  
  
  
  
 acetaminophen 325 mg tablet Commonly known as:  TYLENOL Take  by mouth every four (4) hours as needed for Pain. atomoxetine 100 mg capsule Commonly known as:  STRATTERA Take 100 mg by mouth daily. chlorproMAZINE 100 mg tablet Commonly known as:  THORAZINE Take 100 mg by mouth three (3) times daily as needed. divalproex  mg tablet Commonly known as:  DEPAKOTE Take  by mouth three (3) times daily. docusate sodium 100 mg capsule Commonly known as:  Lino Peeling Take 100 mg by mouth two (2) times a day. LORazepam 2 mg tablet Commonly known as:  ATIVAN Take  by mouth every six (6) hours as needed for Anxiety. QUEtiapine 200 mg tablet Commonly known as:  SEROquel Take 200 mg by mouth three (3) times daily. trimethoprim-sulfamethoxazole 160-800 mg per tablet Commonly known as:  BACTRIM DS, SEPTRA DS Take 2 Tabs by mouth two (2) times a day for 10 days. Follow-up Instructions Return in about 2 weeks (around 3/21/2018). Patient Instructions Epidermoid Cyst: Care Instructions Your Care Instructions An epidermoid (say \"qc-nlo-FNH-moyd\") cyst is a lump just under the skin. These cysts can form when a hair follicle becomes blocked. They are common in acne and may occur on the face, neck, back, and genitals. However, they can form anywhere on the body. These cysts are not cancer and do not lead to cancer. They tend not to hurt, but they can sometimes become swollen and painful. They also may break open (rupture) and cause scarring. These cysts sometimes do not cause problems and may not need treatment. If you have a cyst that is swollen and hurts, your doctor may inject it with a medicine to help it heal. But it is more likely that a painful cyst will need to be removed. Your doctor will give you a shot of numbing medicine and cut into the cyst to drain it or remove it. This makes the symptoms go away. Follow-up care is a key part of your treatment and safety.  Be sure to make and go to all appointments, and call your doctor if you are having problems. It's also a good idea to know your test results and keep a list of the medicines you take. How can you care for yourself at home? · Do not squeeze the cyst or poke it with a needle to open it. This can cause swelling, redness, and infection. · Always have a doctor look at any new lumps you get to make sure that they are not serious. When should you call for help? Watch closely for changes in your health, and be sure to contact your doctor if: 
? · You have a fever, redness, or swelling after you get a shot of medicine in the cyst.  
? · You see or feel a new lump on your skin. Where can you learn more? Go to http://genna-chester.info/. Enter C918 in the search box to learn more about \"Epidermoid Cyst: Care Instructions. \" Current as of: October 13, 2016 Content Version: 11.4 © 4541-7119 GameFly. Care instructions adapted under license by [employer] (which disclaims liability or warranty for this information). If you have questions about a medical condition or this instruction, always ask your healthcare professional. [place] [address] any warranty or liability for your use of this information. Introducing [facility] & HEALTH SERVICES! [facility] [place] introduces JumpTheClub patient portal. Now you can access parts of your medical record, email your doctor's office, and request medication refills online. 1. In your internet browser, go to [email or web address] Eye [email or web address] 2. Click on the First Time User? Click Here link in the Sign In box. You will see the New Member Sign Up page. 3. Enter your JumpTheClub Access Code exactly as it appears below. You will not need to use this code after youve completed the sign-up process. If you do not sign up before the expiration date, you must request a new code. · JumpTheClub Access Code: [email or web address] Expires: 5/29/2018  9:42 AM 
 
 4. Enter the last four digits of your Social Security Number (xxxx) and Date of Birth (mm/dd/yyyy) as indicated and click Submit. You will be taken to the next sign-up page. 5. Create a [place] NetentSec ID. This will be your [place] Netent[employer] login ID and cannot be changed, so think of one that is secure and easy to remember. 6. Create a [place] Netent[employer] password. You can change your password at any time. 7. Enter your Password Reset Question and Answer. This can be used at a later time if you forget your password. 8. Enter your e-mail address. You will receive e-mail notification when new information is available in [address] 9. Click Sign Up. You can now view and download portions of your medical record. 10. Click the Download Summary menu link to download a portable copy of your medical information. If you have questions, please visit the Frequently Asked Questions section of the [employer] website. Remember, [employer] is NOT to be used for urgent needs. For medical emergencies, dial 911. Now available from your iPhone and Android! Please provide this summary of care documentation to your next provider. Your primary care clinician is listed as [address]. If you have any questions after today's visit, please call [phone].

## 2018-03-07 NOTE — PROGRESS NOTES
1. Have you been to the ER, urgent care clinic since your last visit? Hospitalized since your last visit?  2/28/18     2. Have you seen or consulted any other health care providers outside of the [address] [place] [address] since your last visit? Include any pap smears or colon screening.    no

## 2018-03-07 NOTE — LETTER
3/22/2018 7:20 AM 
 
Patient:  Adam Gotti YOB: 1995 Date of Visit: 3/7/2018 Dear Emily Montgomery MD 
[address] [place] [address] VIA In Basket 
 : Thank you for referring Mr. Adam Gotti to me for evaluation/treatment. Below are the relevant portions of my assessment and plan of care. If you have questions, please do not hesitate to call me. I look forward to following Mr. Paige along with you. Sincerely, Jamie Andrews MD

## 2018-03-19 NOTE — PROGRESS NOTES
[employer] General Surgery History and Physical    History of Present Illness:      Meri Rivers is a 25 y.o. male who has had an infected sebaceous cyst of the right medial thigh for the past few days. The pain has increased slowly over the past few days as well as the redness and swelling. The pain is a 3 of 10. He lives in a group home currently and has care takers with him today. He denies any fevers. He has not had an infection like this in the past.    Past Medical History:   Diagnosis Date    Asthma     Seizures ([facility] [address]     last seizure 2001       PSH - none    Current Outpatient Prescriptions:     LORazepam (ATIVAN) 2 mg tablet, Take  by mouth every six (6) hours as needed for Anxiety. , Disp: , Rfl:     chlorproMAZINE (THORAZINE) 100 mg tablet, Take 100 mg by mouth three (3) times daily as needed. , Disp: , Rfl:     acetaminophen (TYLENOL) 325 mg tablet, Take  by mouth every four (4) hours as needed for Pain., Disp: , Rfl:     atomoxetine (STRATTERA) 100 mg capsule, Take 100 mg by mouth daily. , Disp: , Rfl:     docusate sodium (COLACE) 100 mg capsule, Take 100 mg by mouth two (2) times a day., Disp: , Rfl:     QUEtiapine (SEROQUEL) 200 mg tablet, Take 200 mg by mouth three (3) times daily. , Disp: , Rfl:     divalproex DR (DEPAKOTE) 500 mg tablet, Take  by mouth three (3) times daily. , Disp: , Rfl:     Allergies   Allergen Reactions    Bee Sting [Sting, Bee] Sneezing    Coconut Oil Hives    Mushroom Nausea and Vomiting       Social History     Social History    Marital status: SINGLE     Spouse name: N/A    Number of children: N/A    Years of education: N/A     Occupational History    Not on file.      Social History Main Topics    Smoking status: Current Every Day Smoker     Packs/day: 1.00    Smokeless tobacco: Never Used    Alcohol use 0.6 - 1.2 oz/week     1 - 2 Shots of liquor per week      Comment: per week    Drug use: No    Sexual activity: Yes     Partners: Female     Birth control/ protection: None     Other Topics Concern    Not on file     Social History Narrative       No family history on file. ROS   Constitutional: negative  Ears, Nose, Mouth, Throat, and Face: negative  Respiratory: negative  Cardiovascular: negative  Gastrointestinal: negative  Genitourinary:negative  Integument/Breast: right thigh with erythema and swelling  Hematologic/Lymphatic: negative  Behavioral/Psychiatric: negative  Allergic/Immunologic: negative      Physical Exam:     Visit Vitals    /80    Pulse 91    Temp 98.3 °F (36.8 °C)    Resp 18    Ht 5' 9\" (1.753 m)    Wt 160 lb (72.6 kg)    SpO2 97%    BMI 23.63 kg/m2       General - alert and oriented, no apparent distress  HEENT - no jaundice, no hearing imparement  Pulm - CTAB, no C/W/R  CV - RRR, no M/R/G  Abd - soft, ND, BS Present  Ext - pulses intact in UE and LE bilaterally, no edema  Skin - supple, no rashes, right lower thigh medial area with a 5cm sebaceous cyst with erythema, fluctuance,  Induration, no drainage  Psychiatric - normal affect, good mood    Labs  none    Imaging  none  I have reviewed and agree with all of the pertinent images    Assessment:     Katelynn Cristina is a 25 y.o. male with infected sebaceous cyst of the right thigh    Recommendations:     1. He had an I+D of the sebaceous cyst of the right thigh in the office today. The cyst is relatively large and was packed. He will remove the packing in two days at home and follow up with me in 2 wks. He will continue on his PO abx. He will need excision of the cyst in about 6 wks. Edna Juarez MD    Mr. Keagan Roper has a reminder for a \"due or due soon\" health maintenance. I have asked that he contact his primary care provider for follow-up on this health maintenance.

## 2018-03-21 ENCOUNTER — OFFICE VISIT (OUTPATIENT)
Dept: SURGERY | Age: 23
End: 2018-03-21

## 2018-03-21 VITALS
DIASTOLIC BLOOD PRESSURE: 70 MMHG | HEART RATE: 94 BPM | WEIGHT: 159 LBS | SYSTOLIC BLOOD PRESSURE: 118 MMHG | OXYGEN SATURATION: 98 % | RESPIRATION RATE: 18 BRPM | BODY MASS INDEX: 23.55 KG/M2 | HEIGHT: 69 IN | TEMPERATURE: 97.9 F

## 2018-03-21 DIAGNOSIS — L08.9 INFECTED SEBACEOUS CYST OF SKIN: Primary | ICD-10-CM

## 2018-03-21 DIAGNOSIS — L72.3 INFECTED SEBACEOUS CYST OF SKIN: Primary | ICD-10-CM

## 2018-03-21 NOTE — LETTER
3/27/2018 2:20 PM 
 
Patient:  Valentino Everts YOB: 1995 Date of Visit: 3/21/2018 Dear Kelly Gongora MD 
[address] [place] [id number] VIA In Basket 
 : Thank you for referring Mr. Valentino Everts to me for evaluation/treatment. Below are the relevant portions of my assessment and plan of care. If you have questions, please do not hesitate to call me. I look forward to following Mr. Paige along with you. Sincerely, Ninfa Sweeney MD

## 2018-03-21 NOTE — PROGRESS NOTES
1. Have you been to the ER, urgent care clinic since your last visit? Hospitalized since your last visit?  no    2. Have you seen or consulted any other health care providers outside of the [address] [place] [address] since your last visit? Include any pap smears or colon screening.    no

## 2018-03-21 NOTE — MR AVS SNAPSHOT
[address] [facility] [place] [address] 
[phone] Patient: Pedro Hernandez MRN: [id number] :1995 Visit Information Date & Time Provider Department Dept. Phone Encounter #  
 3/21/2018 10:00 AM Tanvir Erickson, [phone] [id number] Follow-up Instructions Routing History Your Appointments 2018  9:40 AM  
POST OP with Salomón Rider NP  
[facility] GENERAL SURGERY [address] ([address]) Appt Note: 2018:NL: Excision of sebaceous cyst of the Right thigh,   PO  
 [address] [facility] [address]  
[address] [facility] Upcoming Health Maintenance Date Due Pneumococcal 19-64 Medium Risk (1 of 1 - PPSV23) 2014 DTaP/Tdap/Td series (1 - Tdap) 2016 Influenza Age 5 to Adult 2017 Allergies as of 3/21/2018  Review Complete On: 3/21/2018 By: Annia Bell LPN Severity Noted Reaction Type Reactions Bee Sting [Sting, Bee]  10/31/2017    Sneezing Coconut Oil  10/31/2017    Hives Mushroom  10/31/2017    Nausea and Vomiting Current Immunizations  Never Reviewed No immunizations on file. Not reviewed this visit You Were Diagnosed With   
  
 Codes Comments Infected sebaceous cyst of skin    -  Primary ICD-10-CM: L72.3, L08.9 ICD-9-CM: 706. 2 Vitals BP Pulse Temp Resp Height(growth percentile) Weight(growth percentile) 118/70 94 97.9 °F (36.6 °C) 18 5' 9\" (1.753 m) 159 lb (72.1 kg) SpO2 BMI Smoking Status 98% 23.48 kg/m2 Current Every Day Smoker Vitals History BMI and BSA Data Body Mass Index Body Surface Area  
 23.48 kg/m 2 1.87 m 2 Preferred Pharmacy Pharmacy Name Phone [facility] [address], [place] [id number] [facility] [address] [phone] Your Updated Medication List  
  
   
This list is accurate as of 3/21/18 11:59 PM.  Always use your most recent med list.  
  
  
  
  
 acetaminophen 325 mg tablet Commonly known as:  TYLENOL Take  by mouth every four (4) hours as needed for Pain. atomoxetine 100 mg capsule Commonly known as:  STRATTERA Take 100 mg by mouth daily. chlorproMAZINE 100 mg tablet Commonly known as:  THORAZINE Take 100 mg by mouth three (3) times daily as needed. divalproex  mg tablet Commonly known as:  DEPAKOTE Take  by mouth three (3) times daily. docusate sodium 100 mg capsule Commonly known as:  Lo Ignacia Take 100 mg by mouth two (2) times a day. LORazepam 2 mg tablet Commonly known as:  ATIVAN Take  by mouth every six (6) hours as needed for Anxiety. QUEtiapine 200 mg tablet Commonly known as:  SEROquel Take 200 mg by mouth three (3) times daily. Patient Instructions Epidermoid Cyst: Care Instructions Your Care Instructions An epidermoid (say \"[facility]\") cyst is a lump just under the skin. These cysts can form when a hair follicle becomes blocked. They are common in acne and may occur on the face, neck, back, and genitals. However, they can form anywhere on the body. These cysts are not cancer and do not lead to cancer. They tend not to hurt, but they can sometimes become swollen and painful. They also may break open (rupture) and cause scarring. These cysts sometimes do not cause problems and may not need treatment. If you have a cyst that is swollen and hurts, your doctor may inject it with a medicine to help it heal. But it is more likely that a painful cyst will need to be removed. Your doctor will give you a shot of numbing medicine and cut into the cyst to drain it or remove it. This makes the symptoms go away. Follow-up care is a key part of your treatment and safety.  Be sure to make and go to all appointments, and call your doctor if you are having problems. It's also a good idea to know your test results and keep a list of the medicines you take. How can you care for yourself at home? · Do not squeeze the cyst or poke it with a needle to open it. This can cause swelling, redness, and infection. · Always have a doctor look at any new lumps you get to make sure that they are not serious. When should you call for help? Watch closely for changes in your health, and be sure to contact your doctor if: 
? · You have a fever, redness, or swelling after you get a shot of medicine in the cyst.  
? · You see or feel a new lump on your skin. Where can you learn more? Go to http://genna-chester.info/. Enter P730 in the search box to learn more about \"Epidermoid Cyst: Care Instructions. \" Current as of: October 13, 2016 Content Version: 11.4 © 5106-8024 [employer]. Care instructions adapted under license by [employer] (which disclaims liability or warranty for this information). If you have questions about a medical condition or this instruction, always ask your healthcare professional. [place] [address] any warranty or liability for your use of this information. Introducing [facility]! [employer] introduces [employer] patient portal. Now you can access parts of your medical record, email your doctor's office, and request medication refills online. 1. In your internet browser, go to [email or web address] 2. Click on the First Time User? Click Here link in the Sign In box. You will see the New Member Sign Up page. 3. Enter your [employer] Access Code exactly as it appears below. You will not need to use this code after youve completed the sign-up process. If you do not sign up before the expiration date, you must request a new code. · [employer] Access Code: [email or web address] Expires: 5/29/2018  9:42 AM 
 
4. Enter the last four digits of your Social Security Number (xxxx) and Date of Birth (mm/dd/yyyy) as indicated and click Submit. You will be taken to the next sign-up page. 5. Create a [employer] ID. This will be your [employer] login ID and cannot be changed, so think of one that is secure and easy to remember. 6. Create a [employer] password. You can change your password at any time. 7. Enter your Password Reset Question and Answer. This can be used at a later time if you forget your password. 8. Enter your e-mail address. You will receive e-mail notification when new information is available in [address] 9. Click Sign Up. You can now view and download portions of your medical record. 10. Click the Download Summary menu link to download a portable copy of your medical information. If you have questions, please visit the Frequently Asked Questions section of the [employer] website. Remember, [employer] is NOT to be used for urgent needs. For medical emergencies, dial 911. Now available from your iPhone and Android! Please provide this summary of care documentation to your next provider. Your primary care clinician is listed as [address]. If you have any questions after today's visit, please call [phone].

## 2018-03-22 NOTE — PATIENT INSTRUCTIONS
Epidermoid Cyst: Care Instructions  Your Care Instructions  An epidermoid (say \"bj-twa-FVZ-roldan\") cyst is a lump just under the skin. These cysts can form when a hair follicle becomes blocked. They are common in acne and may occur on the face, neck, back, and genitals. However, they can form anywhere on the body. These cysts are not cancer and do not lead to cancer. They tend not to hurt, but they can sometimes become swollen and painful. They also may break open (rupture) and cause scarring. These cysts sometimes do not cause problems and may not need treatment. If you have a cyst that is swollen and hurts, your doctor may inject it with a medicine to help it heal. But it is more likely that a painful cyst will need to be removed. Your doctor will give you a shot of numbing medicine and cut into the cyst to drain it or remove it. This makes the symptoms go away. Follow-up care is a key part of your treatment and safety. Be sure to make and go to all appointments, and call your doctor if you are having problems. It's also a good idea to know your test results and keep a list of the medicines you take. How can you care for yourself at home? · Do not squeeze the cyst or poke it with a needle to open it. This can cause swelling, redness, and infection. · Always have a doctor look at any new lumps you get to make sure that they are not serious. When should you call for help? Watch closely for changes in your health, and be sure to contact your doctor if:  ? · You have a fever, redness, or swelling after you get a shot of medicine in the cyst.   ? · You see or feel a new lump on your skin. Where can you learn more? Go to http://genna-chester.info/. Enter K359 in the search box to learn more about \"Epidermoid Cyst: Care Instructions. \"  Current as of: October 13, 2016  Content Version: 11.4  © 4290-8381 Healthwise, [employer].  Care instructions adapted under license by [employer] (which disclaims liability or warranty for this information). If you have questions about a medical condition or this instruction, always ask your healthcare professional. [address] any warranty or liability for your use of this information.

## 2018-03-22 NOTE — PROCEDURES
Procedure Note    Date - 3/7/18    Preoperative Diagnosis - infected sebaceous cyst of the right thigh    Postoperative Diagnosis - infected sebaceous cyst of the right thigh    Operation Performed - Incision and drainage of infected sebaceous cyst of the right thigh    Indication - Natalie Harrington is a 25 y. o.yr old male with infected sebaceous cyst of the right thigh    Surgeon - Brennon Florentino M.D. Description of operation - The patient was consented prior to starting the procedure. All risks and benefits were explained in full detail. A time out was performed to identify the patient location and procedure. I then prepped and draped the area in standard sterile fashion. I then injected lidocaine into the operative field to anesthetize the area. I then made a 1cm incision into the cyst and remove a large amount of purulent and sebaceous fluid. The wound was washed out with saline irrigation and packed with 1/2 packing. A dry sterile dressing was applied. Dr Jesus Wellington performed the entire procedure and was present the entire time.       Anesthesia - 1% lidocaine    Findings - infected sebaceous cyst of the right thigh    Blood loss - minimal    Complications - none    Specimens/cultures - none    Disposition - DC home, FU 2 wks

## 2018-03-27 NOTE — PATIENT INSTRUCTIONS
Epidermoid Cyst: Care Instructions  Your Care Instructions  An epidermoid (say \"fj-hzr-BEK-roldan\") cyst is a lump just under the skin. These cysts can form when a hair follicle becomes blocked. They are common in acne and may occur on the face, neck, back, and genitals. However, they can form anywhere on the body. These cysts are not cancer and do not lead to cancer. They tend not to hurt, but they can sometimes become swollen and painful. They also may break open (rupture) and cause scarring. These cysts sometimes do not cause problems and may not need treatment. If you have a cyst that is swollen and hurts, your doctor may inject it with a medicine to help it heal. But it is more likely that a painful cyst will need to be removed. Your doctor will give you a shot of numbing medicine and cut into the cyst to drain it or remove it. This makes the symptoms go away. Follow-up care is a key part of your treatment and safety. Be sure to make and go to all appointments, and call your doctor if you are having problems. It's also a good idea to know your test results and keep a list of the medicines you take. How can you care for yourself at home? · Do not squeeze the cyst or poke it with a needle to open it. This can cause swelling, redness, and infection. · Always have a doctor look at any new lumps you get to make sure that they are not serious. When should you call for help? Watch closely for changes in your health, and be sure to contact your doctor if:  ? · You have a fever, redness, or swelling after you get a shot of medicine in the cyst.   ? · You see or feel a new lump on your skin. Where can you learn more? Go to http://genna-chester.info/. Enter H179 in the search box to learn more about \"Epidermoid Cyst: Care Instructions. \"  Current as of: October 13, 2016  Content Version: 11.4  © 7211-8490 [employer].  Care instructions adapted under license by [employer] (which disclaims liability or warranty for this information). If you have questions about a medical condition or this instruction, always ask your healthcare professional. [address] any warranty or liability for your use of this information.

## 2018-03-27 NOTE — PROGRESS NOTES
[facility] General Surgery History and Physical    History of Present Illness:      Laurel Cortez is a 25 y.o. male who is s/p I+D of right thigh infected sebaceous cyst.  The cyst was drained in the office 2 wks ago. The cyst is improving. He does not have any pain currently. He has finished the abx. He does not have any redness or drainage to the area. Past Medical History:   Diagnosis Date    Asthma     Seizures ([address]     last seizure 2001       No past surgical history on file. Current Outpatient Prescriptions:     LORazepam (ATIVAN) 2 mg tablet, Take  by mouth every six (6) hours as needed for Anxiety. , Disp: , Rfl:     chlorproMAZINE (THORAZINE) 100 mg tablet, Take 100 mg by mouth three (3) times daily as needed. , Disp: , Rfl:     acetaminophen (TYLENOL) 325 mg tablet, Take  by mouth every four (4) hours as needed for Pain., Disp: , Rfl:     atomoxetine (STRATTERA) 100 mg capsule, Take 100 mg by mouth daily. , Disp: , Rfl:     docusate sodium (COLACE) 100 mg capsule, Take 100 mg by mouth two (2) times a day., Disp: , Rfl:     QUEtiapine (SEROQUEL) 200 mg tablet, Take 200 mg by mouth three (3) times daily. , Disp: , Rfl:     divalproex DR (DEPAKOTE) 500 mg tablet, Take  by mouth three (3) times daily. , Disp: , Rfl:     Allergies   Allergen Reactions    Bee Sting [Sting, Bee] Sneezing    Coconut Oil Hives    Mushroom Nausea and Vomiting       Social History     Social History    Marital status: SINGLE     Spouse name: N/A    Number of children: N/A    Years of education: N/A     Occupational History    Not on file.      Social History Main Topics    Smoking status: Current Every Day Smoker     Packs/day: 1.00    Smokeless tobacco: Never Used    Alcohol use 0.6 - 1.2 oz/week     1 - 2 Shots of liquor per week      Comment: per week    Drug use: No    Sexual activity: Yes     Partners: Female     Birth control/ protection: None     Other Topics Concern    Not on file     Social History Narrative       No family history on file. ROS   Constitutional: negative  Ears, Nose, Mouth, Throat, and Face: negative  Respiratory: negative  Cardiovascular: negative  Gastrointestinal: negative  Genitourinary:negative  Integument/Breast: healing infected sebaceous cyst on R thigh  Hematologic/Lymphatic: negative  Behavioral/Psychiatric: negative  Allergic/Immunologic: negative      Physical Exam:     Visit Vitals    /70    Pulse 94    Temp 97.9 °F (36.6 °C)    Resp 18    Ht 5' 9\" (1.753 m)    Wt 159 lb (72.1 kg)    SpO2 98%    BMI 23.48 kg/m2       General - alert and oriented, no apparent distress  HEENT - no jaundice, no hearing imparement  Pulm - CTAB, no C/W/R  CV - RRR, no M/R/G  Abd - soft, ND, BS present, NTTP  Ext - pulses intact in UE and LE bilaterally, no edema  Skin - supple, no rashes, R medial distal thigh with 2-3 sebaceous cyst present, no erythema, minimal fluctuance. Psychiatric - normal affect, good mood    Labs  none    Imaging  none  I have reviewed and agree with all of the pertinent images    Assessment:     Walter Farah is a 25 y.o. male with infected sebaceous cyst of the right medial thigh    Recommendations:     1. The infection in the cyst is now cleared. He will need excision of the cyst in the OR in the next 4 wks. I have discussed the above procedure with the patient in detail. We reviewed the benefits and possible complications of the surgery which include bleeding, infection, damage to adjacent organs, venous thromboembolism, need for repeat surgery, death and other unforseen complications. The patient agreed to proceed with the surgery. Tariq Reagan MD    Mr. Ramon Bradford has a reminder for a \"due or due soon\" health maintenance. I have asked that he contact his primary care provider for follow-up on this health maintenance.

## 2018-03-28 NOTE — PROGRESS NOTES
History of Present Illness:   Golden Ramon is a 25 y.o. male here for evaluation:    Chief Complaint   Patient presents with   [facility] Follow Up     2/28/18, patient urgent care, cellulitis of right lower extremity, 5 right knee, red Jena around the cyst, pt stated it itch, pt was given medication to help with infection      Reviewed UC findings and antibiotic therapy. Reviewed dose increase in Bactrim pending drainage infection/abscess. Worsening swelling/fluid since UC eval noted. Pt is not interested in abscess drainage today. WIth surgery scheduling prefers tomorrow. He is with . Reviewed optimal to have surgery drain, given abscess size, and need for possible packing and wound follow-up. Pt and CW agreeable with plan. CW has question about prior auth for quetiapine. [facility], to address with that provider. Past Medical History:   Diagnosis Date    Asthma     Seizures ([facility] [address]     last seizure 2001        Prior to Admission medications    Medication Sig Start Date End Date Taking? Authorizing Provider   LORazepam (ATIVAN) 2 mg tablet Take  by mouth every six (6) hours as needed for Anxiety. Yes Historical Provider   chlorproMAZINE (THORAZINE) 100 mg tablet Take 100 mg by mouth three (3) times daily as needed. Yes Historical Provider   acetaminophen (TYLENOL) 325 mg tablet Take  by mouth every four (4) hours as needed for Pain. Yes Historical Provider   atomoxetine (STRATTERA) 100 mg capsule Take 100 mg by mouth daily. Yes Historical Provider   docusate sodium (COLACE) 100 mg capsule Take 100 mg by mouth two (2) times a day. Yes Historical Provider   QUEtiapine (SEROQUEL) 200 mg tablet Take 200 mg by mouth three (3) times daily. Yes Historical Provider   divalproex DR (DEPAKOTE) 500 mg tablet Take  by mouth three (3) times daily.    Yes Historical Provider        ROS    Vitals:    03/06/18 1203   BP: 116/81   Pulse: 95 Resp: 18   Temp: 97.8 °F (36.6 °C)   TempSrc: Oral   SpO2: 98%   Weight: 160 lb 12.8 oz (72.9 kg)   Height: 5' 9\" (1.753 m)   PainSc:   5      Body mass index is 23.75 kg/(m^2). Physical Exam:     Physical Exam   Constitutional: He appears well-developed and well-nourished. No distress. HENT:   Head: Normocephalic and atraumatic. Eyes: Conjunctivae are normal. Right eye exhibits no discharge. Left eye exhibits no discharge. No scleral icterus. Neck: Normal range of motion. Neck supple. Cardiovascular: Normal rate, regular rhythm, normal heart sounds and intact distal pulses. Exam reveals no gallop and no friction rub. No murmur heard. Pulmonary/Chest: Effort normal and breath sounds normal. No respiratory distress. He has no wheezes. He has no rales. Abdominal: Soft. Bowel sounds are normal. He exhibits no distension. There is no tenderness. Musculoskeletal: He exhibits no edema or tenderness. Legs:  Neurological: He is alert. He exhibits normal muscle tone. Coordination normal.   Skin: Skin is warm. Rash noted. He is not diaphoretic. No erythema. No pallor. Psychiatric: He has a normal mood and affect. His behavior is normal. Judgment and thought content normal.       Assessment and Plan:       ICD-10-CM ICD-9-CM    1. Abscess of right thigh L02.415 682.6 REFERRAL TO GENERAL SURGERY      trimethoprim-sulfamethoxazole (BACTRIM DS, SEPTRA DS) 160-800 mg per tablet       Printed script to increase dose as reviewed. Drainage and scheduling with General Surgery reviewed with pt and  at visit. Follow-up Disposition:  Return if symptoms worsen or fail to improve. reviewed medications and side effects in detail    For additional documentation of information and/or recommendations discussed this visit, please see notes in instructions.       Plan and evaluation (above) reviewed with pt/ at visit  Patient/ voiced understanding of plan and provided with time to ask/review questions. After Visit Summary (AVS) provided to pt/ after visit with additional instructions as needed/reviewed.

## 2018-04-30 ENCOUNTER — ANESTHESIA EVENT (OUTPATIENT)
Dept: SURGERY | Age: 23
End: 2018-04-30
Payer: MEDICAID

## 2018-05-01 ENCOUNTER — ANESTHESIA (OUTPATIENT)
Dept: SURGERY | Age: 23
End: 2018-05-01
Payer: MEDICAID

## 2018-05-01 ENCOUNTER — HOSPITAL ENCOUNTER (OUTPATIENT)
Age: 23
Setting detail: OUTPATIENT SURGERY
Discharge: HOME | End: 2018-05-01
Attending: SURGERY | Admitting: SURGERY
Payer: MEDICAID

## 2018-05-01 VITALS
TEMPERATURE: 98.3 F | DIASTOLIC BLOOD PRESSURE: 79 MMHG | SYSTOLIC BLOOD PRESSURE: 115 MMHG | BODY MASS INDEX: 23.55 KG/M2 | HEART RATE: 78 BPM | OXYGEN SATURATION: 99 % | RESPIRATION RATE: 16 BRPM | HEIGHT: 69 IN | WEIGHT: 159 LBS

## 2018-05-01 DIAGNOSIS — L72.3 SEBACEOUS CYST: Primary | ICD-10-CM

## 2018-05-01 PROCEDURE — 74011250636 HC RX REV CODE- 250/636: Performed by: ANESTHESIOLOGY

## 2018-05-01 PROCEDURE — 76010000138 HC OR TIME 0.5 TO 1 HR: Performed by: SURGERY

## 2018-05-01 PROCEDURE — 74011000250 HC RX REV CODE- 250

## 2018-05-01 PROCEDURE — 76210000000 HC OR PH I REC 2 TO 2.5 HR: Performed by: SURGERY

## 2018-05-01 PROCEDURE — 77030018836 HC SOL IRR NACL ICUM -A: Performed by: SURGERY

## 2018-05-01 PROCEDURE — 77030020782 HC GWN BAIR PAWS FLX 3M -B

## 2018-05-01 PROCEDURE — 74011250636 HC RX REV CODE- 250/636: Performed by: SURGERY

## 2018-05-01 PROCEDURE — 88304 TISSUE EXAM BY PATHOLOGIST: CPT | Performed by: SURGERY

## 2018-05-01 PROCEDURE — 74011000250 HC RX REV CODE- 250: Performed by: SURGERY

## 2018-05-01 PROCEDURE — 74011250636 HC RX REV CODE- 250/636

## 2018-05-01 PROCEDURE — 76210000021 HC REC RM PH II 0.5 TO 1 HR: Performed by: SURGERY

## 2018-05-01 PROCEDURE — 77030039266 HC ADH SKN EXOFIN S2SG -A: Performed by: SURGERY

## 2018-05-01 PROCEDURE — 77030031139 HC SUT VCRL2 J&J -A: Performed by: SURGERY

## 2018-05-01 PROCEDURE — 77030011640 HC PAD GRND REM COVD -A: Performed by: SURGERY

## 2018-05-01 PROCEDURE — 76060000032 HC ANESTHESIA 0.5 TO 1 HR: Performed by: SURGERY

## 2018-05-01 PROCEDURE — 77030002933 HC SUT MCRYL J&J -A: Performed by: SURGERY

## 2018-05-01 RX ORDER — ROPIVACAINE HYDROCHLORIDE 5 MG/ML
150 INJECTION, SOLUTION EPIDURAL; INFILTRATION; PERINEURAL AS NEEDED
Status: DISCONTINUED | OUTPATIENT
Start: 2018-05-01 | End: 2018-05-01 | Stop reason: HOSPADM

## 2018-05-01 RX ORDER — PROPOFOL 10 MG/ML
INJECTION, EMULSION INTRAVENOUS
Status: DISCONTINUED | OUTPATIENT
Start: 2018-05-01 | End: 2018-05-01 | Stop reason: HOSPADM

## 2018-05-01 RX ORDER — SODIUM CHLORIDE 0.9 % (FLUSH) 0.9 %
5-10 SYRINGE (ML) INJECTION AS NEEDED
Status: DISCONTINUED | OUTPATIENT
Start: 2018-05-01 | End: 2018-05-01 | Stop reason: HOSPADM

## 2018-05-01 RX ORDER — MIDAZOLAM HYDROCHLORIDE 1 MG/ML
INJECTION, SOLUTION INTRAMUSCULAR; INTRAVENOUS AS NEEDED
Status: DISCONTINUED | OUTPATIENT
Start: 2018-05-01 | End: 2018-05-01 | Stop reason: HOSPADM

## 2018-05-01 RX ORDER — SODIUM CHLORIDE, SODIUM LACTATE, POTASSIUM CHLORIDE, CALCIUM CHLORIDE 600; 310; 30; 20 MG/100ML; MG/100ML; MG/100ML; MG/100ML
100 INJECTION, SOLUTION INTRAVENOUS CONTINUOUS
Status: DISCONTINUED | OUTPATIENT
Start: 2018-05-01 | End: 2018-05-01 | Stop reason: HOSPADM

## 2018-05-01 RX ORDER — SODIUM CHLORIDE 0.9 % (FLUSH) 0.9 %
5-10 SYRINGE (ML) INJECTION EVERY 8 HOURS
Status: DISCONTINUED | OUTPATIENT
Start: 2018-05-01 | End: 2018-05-01 | Stop reason: HOSPADM

## 2018-05-01 RX ORDER — FENTANYL CITRATE 50 UG/ML
50 INJECTION, SOLUTION INTRAMUSCULAR; INTRAVENOUS AS NEEDED
Status: DISCONTINUED | OUTPATIENT
Start: 2018-05-01 | End: 2018-05-01 | Stop reason: HOSPADM

## 2018-05-01 RX ORDER — DIPHENHYDRAMINE HYDROCHLORIDE 50 MG/ML
12.5 INJECTION, SOLUTION INTRAMUSCULAR; INTRAVENOUS AS NEEDED
Status: DISCONTINUED | OUTPATIENT
Start: 2018-05-01 | End: 2018-05-01 | Stop reason: HOSPADM

## 2018-05-01 RX ORDER — CEFAZOLIN SODIUM 2 G/50ML
2 SOLUTION INTRAVENOUS ONCE
Status: COMPLETED | OUTPATIENT
Start: 2018-05-01 | End: 2018-05-01

## 2018-05-01 RX ORDER — LIDOCAINE HYDROCHLORIDE 10 MG/ML
0.1 INJECTION, SOLUTION EPIDURAL; INFILTRATION; INTRACAUDAL; PERINEURAL AS NEEDED
Status: DISCONTINUED | OUTPATIENT
Start: 2018-05-01 | End: 2018-05-01 | Stop reason: HOSPADM

## 2018-05-01 RX ORDER — HYDROMORPHONE HYDROCHLORIDE 2 MG/ML
0.5 INJECTION, SOLUTION INTRAMUSCULAR; INTRAVENOUS; SUBCUTANEOUS
Status: DISCONTINUED | OUTPATIENT
Start: 2018-05-01 | End: 2018-05-01 | Stop reason: HOSPADM

## 2018-05-01 RX ORDER — PROPOFOL 10 MG/ML
INJECTION, EMULSION INTRAVENOUS AS NEEDED
Status: DISCONTINUED | OUTPATIENT
Start: 2018-05-01 | End: 2018-05-01 | Stop reason: HOSPADM

## 2018-05-01 RX ORDER — LIDOCAINE HYDROCHLORIDE 20 MG/ML
INJECTION, SOLUTION EPIDURAL; INFILTRATION; INTRACAUDAL; PERINEURAL AS NEEDED
Status: DISCONTINUED | OUTPATIENT
Start: 2018-05-01 | End: 2018-05-01 | Stop reason: HOSPADM

## 2018-05-01 RX ORDER — FENTANYL CITRATE 50 UG/ML
25 INJECTION, SOLUTION INTRAMUSCULAR; INTRAVENOUS
Status: DISCONTINUED | OUTPATIENT
Start: 2018-05-01 | End: 2018-05-01 | Stop reason: HOSPADM

## 2018-05-01 RX ORDER — BUPIVACAINE HYDROCHLORIDE AND EPINEPHRINE 5; 5 MG/ML; UG/ML
30 INJECTION, SOLUTION EPIDURAL; INTRACAUDAL; PERINEURAL ONCE
Status: COMPLETED | OUTPATIENT
Start: 2018-05-01 | End: 2018-05-01

## 2018-05-01 RX ORDER — OXYCODONE AND ACETAMINOPHEN 5; 325 MG/1; MG/1
1 TABLET ORAL
Qty: 30 TAB | Refills: 0 | Status: SHIPPED | OUTPATIENT
Start: 2018-05-01 | End: 2018-08-30

## 2018-05-01 RX ORDER — MIDAZOLAM HYDROCHLORIDE 1 MG/ML
1 INJECTION, SOLUTION INTRAMUSCULAR; INTRAVENOUS AS NEEDED
Status: DISCONTINUED | OUTPATIENT
Start: 2018-05-01 | End: 2018-05-01 | Stop reason: HOSPADM

## 2018-05-01 RX ORDER — DEXMEDETOMIDINE HYDROCHLORIDE 4 UG/ML
INJECTION, SOLUTION INTRAVENOUS AS NEEDED
Status: DISCONTINUED | OUTPATIENT
Start: 2018-05-01 | End: 2018-05-01 | Stop reason: HOSPADM

## 2018-05-01 RX ORDER — MIDAZOLAM HYDROCHLORIDE 1 MG/ML
0.5 INJECTION, SOLUTION INTRAMUSCULAR; INTRAVENOUS
Status: DISCONTINUED | OUTPATIENT
Start: 2018-05-01 | End: 2018-05-01 | Stop reason: HOSPADM

## 2018-05-01 RX ADMIN — DEXMEDETOMIDINE HYDROCHLORIDE 4 MCG: 4 INJECTION, SOLUTION INTRAVENOUS at 12:58

## 2018-05-01 RX ADMIN — LIDOCAINE HYDROCHLORIDE 40 MG: 20 INJECTION, SOLUTION EPIDURAL; INFILTRATION; INTRACAUDAL; PERINEURAL at 13:00

## 2018-05-01 RX ADMIN — PROPOFOL 50 MG: 10 INJECTION, EMULSION INTRAVENOUS at 13:02

## 2018-05-01 RX ADMIN — PROPOFOL 50 MCG/KG/MIN: 10 INJECTION, EMULSION INTRAVENOUS at 13:00

## 2018-05-01 RX ADMIN — DEXMEDETOMIDINE HYDROCHLORIDE 4 MCG: 4 INJECTION, SOLUTION INTRAVENOUS at 13:08

## 2018-05-01 RX ADMIN — DEXMEDETOMIDINE HYDROCHLORIDE 4 MCG: 4 INJECTION, SOLUTION INTRAVENOUS at 13:04

## 2018-05-01 RX ADMIN — MIDAZOLAM HYDROCHLORIDE 3 MG: 1 INJECTION, SOLUTION INTRAMUSCULAR; INTRAVENOUS at 12:55

## 2018-05-01 RX ADMIN — DEXMEDETOMIDINE HYDROCHLORIDE 4 MCG: 4 INJECTION, SOLUTION INTRAVENOUS at 13:02

## 2018-05-01 RX ADMIN — SODIUM CHLORIDE, SODIUM LACTATE, POTASSIUM CHLORIDE, AND CALCIUM CHLORIDE 100 ML/HR: 600; 310; 30; 20 INJECTION, SOLUTION INTRAVENOUS at 12:43

## 2018-05-01 RX ADMIN — DEXMEDETOMIDINE HYDROCHLORIDE 4 MCG: 4 INJECTION, SOLUTION INTRAVENOUS at 13:00

## 2018-05-01 RX ADMIN — DEXMEDETOMIDINE HYDROCHLORIDE 4 MCG: 4 INJECTION, SOLUTION INTRAVENOUS at 13:06

## 2018-05-01 RX ADMIN — PROPOFOL 50 MG: 10 INJECTION, EMULSION INTRAVENOUS at 13:10

## 2018-05-01 RX ADMIN — MIDAZOLAM HYDROCHLORIDE 2 MG: 1 INJECTION, SOLUTION INTRAMUSCULAR; INTRAVENOUS at 12:57

## 2018-05-01 RX ADMIN — CEFAZOLIN SODIUM 2 G: 2 SOLUTION INTRAVENOUS at 13:10

## 2018-05-01 RX ADMIN — DEXMEDETOMIDINE HYDROCHLORIDE 4 MCG: 4 INJECTION, SOLUTION INTRAVENOUS at 12:59

## 2018-05-01 RX ADMIN — PROPOFOL 50 MG: 10 INJECTION, EMULSION INTRAVENOUS at 13:17

## 2018-05-01 RX ADMIN — DEXMEDETOMIDINE HYDROCHLORIDE 2 MCG: 4 INJECTION, SOLUTION INTRAVENOUS at 13:05

## 2018-05-01 NOTE — ANESTHESIA PREPROCEDURE EVALUATION
Anesthetic History   No history of anesthetic complications            Review of Systems / Medical History  Patient summary reviewed, nursing notes reviewed and pertinent labs reviewed    Pulmonary  Within defined limits          Asthma        Neuro/Psych   Within defined limits           Cardiovascular  Within defined limits                     GI/Hepatic/Renal  Within defined limits              Endo/Other  Within defined limits           Other Findings              Physical Exam    Airway  Mallampati: II  TM Distance: > 6 cm  Neck ROM: normal range of motion   Mouth opening: Normal     Cardiovascular  Regular rate and rhythm,  S1 and S2 normal,  no murmur, click, rub, or gallop             Dental  No notable dental hx       Pulmonary  Breath sounds clear to auscultation               Abdominal  GI exam deferred       Other Findings            Anesthetic Plan    ASA: 2  Anesthesia type: general          Induction: Intravenous  Anesthetic plan and risks discussed with: Patient

## 2018-05-01 NOTE — H&P
[facility] History and Physical     History of Present Illness:       Maricruz Preciado is a 25 y.o. male who is s/p I+D of right thigh infected sebaceous cyst.  The cyst was drained in the office 2 wks ago. The cyst is improving. He does not have any pain currently. He has finished the abx. He does not have any redness or drainage to the area.          Past Medical History:   Diagnosis Date    Asthma      Seizures ([facility] [address]       last seizure 2001         No past surgical history on file.        Current Outpatient Prescriptions:     LORazepam (ATIVAN) 2 mg tablet, Take  by mouth every six (6) hours as needed for Anxiety. , Disp: , Rfl:     chlorproMAZINE (THORAZINE) 100 mg tablet, Take 100 mg by mouth three (3) times daily as needed. , Disp: , Rfl:     acetaminophen (TYLENOL) 325 mg tablet, Take  by mouth every four (4) hours as needed for Pain., Disp: , Rfl:     atomoxetine (STRATTERA) 100 mg capsule, Take 100 mg by mouth daily. , Disp: , Rfl:     docusate sodium (COLACE) 100 mg capsule, Take 100 mg by mouth two (2) times a day., Disp: , Rfl:     QUEtiapine (SEROQUEL) 200 mg tablet, Take 200 mg by mouth three (3) times daily. , Disp: , Rfl:     divalproex DR (DEPAKOTE) 500 mg tablet, Take  by mouth three (3) times daily. , Disp: , Rfl:           Allergies   Allergen Reactions    Bee Sting [Sting, Bee] Sneezing    Coconut Oil Hives    Mushroom Nausea and Vomiting         Social History            Social History    Marital status: SINGLE       Spouse name: N/A    Number of children: N/A    Years of education: N/A          Occupational History    Not on file.              Social History Main Topics    Smoking status: Current Every Day Smoker       Packs/day: 1.00    Smokeless tobacco: Never Used    Alcohol use 0.6 - 1.2 oz/week        1 - 2 Shots of liquor per week          Comment: per week    Drug use: No    Sexual activity: Yes       Partners: Female       Birth control/ protection: None           Other Topics Concern    Not on file      Social History Narrative         No family history on file.     ROS   Constitutional: negative  Ears, Nose, Mouth, Throat, and Face: negative  Respiratory: negative  Cardiovascular: negative  Gastrointestinal: negative  Genitourinary:negative  Integument/Breast: healing infected sebaceous cyst on R thigh  Hematologic/Lymphatic: negative  Behavioral/Psychiatric: negative  Allergic/Immunologic: negative        Physical Exam:           Visit Vitals    /70    Pulse 94    Temp 97.9 °F (36.6 °C)    Resp 18    Ht 5' 9\" (1.753 m)    Wt 159 lb (72.1 kg)    SpO2 98%    BMI 23.48 kg/m2         General - alert and oriented, no apparent distress  HEENT - no jaundice, no hearing imparement  Pulm - CTAB, no C/W/R  CV - RRR, no M/R/G  Abd - soft, ND, BS present, NTTP  Ext - pulses intact in UE and LE bilaterally, no edema  Skin - supple, no rashes, R medial distal thigh with 2-3 sebaceous cyst present, no erythema, minimal fluctuance. Psychiatric - normal affect, good mood     Labs  none     Imaging  none  I have reviewed and agree with all of the pertinent images     Assessment:      Jacob Stevens is a 25 y.o. male with infected sebaceous cyst of the right medial thigh     Recommendations:      1. The infection in the cyst is now cleared. He will need excision of the cyst in the OR in the next 4 wks. I have discussed the above procedure with the patient in detail. We reviewed the benefits and possible complications of the surgery which include bleeding, infection, damage to adjacent organs, venous thromboembolism, need for repeat surgery, death and other unforseen complications.   The patient agreed to proceed with the surgery.          Abdiel Ramsay MD

## 2018-05-01 NOTE — ROUTINE PROCESS
Patient: George Romo MRN: [id number]  SSN: [id number]   YOB: 1995  Age: 21 y.o. Sex: male     Patient is status post Procedure(s):  EXCISION OF SEBACEOUS CYST OF RIGHT THIGH .     Surgeon(s) and Role:     * Mary Alfaro MD - Primary    Local/Dose/Irrigation:  Marcaine 0.5% with Epi- 2 ml                  Peripheral IV 05/01/18 Right Hand (Active)                           Dressing/Packing:  Wound Knee Right-DRESSING TYPE: Topical skin adhesive/glue (05/01/18 1333)  Splint/Cast:  ]    Other:

## 2018-05-01 NOTE — DISCHARGE INSTRUCTIONS
______________________________________________________________________    Anesthesia Discharge Instructions    After general anesthesia or intervenous sedation, for 24 hours or while taking prescription Narcotics:  · Limit your activities  · Do not drive or operate hazardous machinery  · If you have not urinated within 8 hours after discharge, please contact your surgeon on call. · Do not make important personal or business decisions  · Do not drink alcoholic beverages    Report the following to your surgeon:  · Excessive pain, swelling, redness or odor of or around the surgical area  · Temperature over 100.5 degrees  · Nausea and vomiting lasting longer than 4 hours or if unable to take medication  · Any signs of decreased circulation or nerve impairment to extremity:  Change in color, persistent numbness, tingling, coldness or increased pain.   · Any questions    ______________________________________________________________________        Excision of cyst or soft tissue mass      Instructions Following Excision of Cyst, Soft Tissue Mass    Activity  · As tolerated    Diet  · Clear liquids until no nausea or vomiting; then light diet for the first day  · Advance to regular diet on second day, unless your doctor orders otherwise  · If nausea and vomiting continues, call your doctor    Pain  · Take pain medication as directed by your doctor  ·  Call your doctor if pain is NOT relieved by medication  · DO NOT take aspirin or blood thinners until directed by your doctor    Dressing Care: none needed, you may shower at home    Follow-Up Phone Calls  · Call will be made nursing staff  · If you have any problems or concerns, call your doctor as needed    Call your doctor if you experience:  · Excessive bleeding that does not stop after holding mild pressure over the area  · Temperature of 101° Fahrenheit or above  · Redness, excessive swelling or bruising, and/or green or yellow, smelly discharge from incision    After Anesthesia  · For the first 24 hours: DO NOT drive, drink alcoholic beverages, or make important decisions  · Be aware of dizziness following anesthesia and while taking pain medication    Other Instructions: none    Appointment date/time: Follow-up with Dr. Horvath Sender in 2 week(s). Call the office to schedule your appointment. Your doctor's phone number: [phone]          Anesthesia Discharge Instructions    After general anesthesia or intervenous sedation, for 24 hours or while taking prescription Narcotics:  · Limit your activities  · Do not drive or operate hazardous machinery  · If you have not urinated within 8 hours after discharge, please contact your surgeon on call. · Do not make important personal or business decisions  · Do not drink alcoholic beverages    Report the following to your surgeon:  · Excessive pain, swelling, redness or odor of or around the surgical area  · Temperature over 100.5 degrees  · Nausea and vomiting lasting longer than 4 hours or if unable to take medication  · Any signs of decreased circulation or nerve impairment to extremity:  Change in color, persistent numbness, tingling, coldness or increased pain. · Any questions      Wound Care: After Your Visit  Your Care Instructions  Taking good care of your wound at home will help it heal quickly and reduce your chance of infection. The doctor has checked you carefully, but problems can develop later. If you notice any problems or new symptoms, get medical treatment right away. Follow-up care is a key part of your treatment and safety. Be sure to make and go to all appointments, and call your doctor if you are having problems. It's also a good idea to know your test results and keep a list of the medicines you take. How can you care for yourself at home? · Clean the area with soap and water 2 times a day. Don't use hydrogen peroxide or alcohol, which can slow healing. ¨ You may cover the wound with a nonstick bandage.   · Take pain medicines exactly as directed. Some pain is normal with a wound, but do not ignore pain that is getting worse instead of better. You could have an infection. ¨ If the doctor gave you a prescription medicine for pain, take it as prescribed. ¨ If you are not taking a prescription pain medicine, ask your doctor if you can take an over-the-counter medicine. · Your doctor closed your wound with skin glue. ¨ Your wound was closed with skin glue, the glue will wear off in a few days to 2 weeks. When should you call for help? Call your doctor now or seek immediate medical care if:  · You have signs of infection, such as:  ¨ Increased pain, swelling, warmth, or redness near the wound. ¨ Red streaks leading from the wound. ¨ Pus draining from the wound. ¨ A fever. · You bleed so much from your incision that you soak one or more bandages over 2 to 4 hours. Watch closely for changes in your health, and be sure to contact your doctor if:  · The wound is not getting better each day. Where can you learn more? Go to [employer]  Enter M973 in the search box to learn more about \"Wound Care: After Your Visit. \"   © 2770-3349 [employer]. Care instructions adapted under license by [employer] (which disclaims liability or warranty for this information). This care instruction is for use with your licensed healthcare professional. If you have questions about a medical condition or this instruction, always ask your healthcare professional. [place] [address] any warranty or liability for your use of this information. Content Version: 83.6.975065;  Last Revised: April 23, 2012

## 2018-05-01 NOTE — BRIEF OP NOTE
BRIEF OPERATIVE NOTE    Date of Procedure: 5/1/2018   Preoperative Diagnosis: INFECTED SEBACEOUS CYST OF RIGHT THIGH  Postoperative Diagnosis: INFECTED SEBACEOUS CYST OF RIGHT THIGH   Procedure(s):  EXCISION OF SEBACEOUS CYST OF RIGHT THIGH   Surgeon(s) and Role:     * Anais Oliveira MD - Primary         Surgical Assistant: Madhav Brooks    Surgical Staff:  Circ-1: Ellen To RN  Physician Assistant: TEMO Ash  Scrub Tech-1: Ryan Castro  Event Time In   Incision Start 1315   Incision Close      Anesthesia: MAC   Estimated Blood Loss: none  Specimens:   ID Type Source Tests Collected by Time Destination   1 : right sebaceous cyst- thigh Fresh Thigh  Anais Oliveira MD 5/1/2018 1328 Pathology      Findings: 2x2cm sebaceous cyst excised   Complications: none  Implants: * No implants in log *

## 2018-05-01 NOTE — ANESTHESIA POSTPROCEDURE EVALUATION
Post-Anesthesia Evaluation and Assessment    Patient: Marie Sommers MRN: [id number]  SSN: [id number]    YOB: 1995  Age: 21 y.o. Sex: male       Cardiovascular Function/Vital Signs  Visit Vitals    /65    Pulse 71    Temp 36.8 °C (98.3 °F)    Resp 11    Ht 5' 9\" (1.753 m)    Wt 72.1 kg (159 lb)    SpO2 98%    BMI 23.48 kg/m2       Patient is status post general anesthesia for Procedure(s):  EXCISION OF SEBACEOUS CYST OF RIGHT THIGH . Nausea/Vomiting: None    Postoperative hydration reviewed and adequate. Pain:  Pain Scale 1: Numeric (0 - 10) (05/01/18 1344)  Pain Intensity 1: 0 (05/01/18 1344)   Managed    Neurological Status:   Neuro (WDL): Within Defined Limits (05/01/18 1344)  Neuro  Neurologic State: Drowsy (05/01/18 1344)  LUE Motor Response: Purposeful (05/01/18 1344)  LLE Motor Response: Purposeful (05/01/18 1344)  RUE Motor Response: Purposeful (05/01/18 1344)  RLE Motor Response: Purposeful (05/01/18 1344)   At baseline    Mental Status and Level of Consciousness: Arousable    Pulmonary Status:   O2 Device: CO2 nasal cannula (05/01/18 1344)   Adequate oxygenation and airway patent    Complications related to anesthesia: None    Post-anesthesia assessment completed.  No concerns    Signed By: Edilma Persaud MD     May 1, 2018

## 2018-05-01 NOTE — IP AVS SNAPSHOT
2700 [facility] [address] [place] [address] 
[phone] Patient: Mavis Alvarez MRN: [id number] :1995 About your hospitalization You were admitted on:  May 1, 2018 [facility] You were discharged on:  May 1, 2018 Why you were hospitalized Your primary diagnosis was:  Not on File Follow-up Information Follow up With Details Comments Contact Info Vielka Acevedo MD   [address] 
[phone] Rafy Springer MD Follow up in 2 week(s)  [address] [facility] [address] 
[phone] Your Scheduled Appointments Monday May 14, 2018 11:40 AM EDT  
POST OP 10 MIN with Jacob Aguilera NP  
[facility] 22 470 ([address]) [address] [place] [address] [phone] Discharge Orders None A check zonia indicates which time of day the medication should be taken. My Medications START taking these medications Instructions Each Dose to Equal  
 Morning Noon Evening Bedtime  
 oxyCODONE-acetaminophen 5-325 mg per tablet Commonly known as:  PERCOCET Your last dose was: Your next dose is: Take 1 Tab by mouth every four (4) hours as needed for Pain. Max Daily Amount: 6 Tabs. 1 Tab CONTINUE taking these medications Instructions Each Dose to Equal  
 Morning Noon Evening Bedtime  
 acetaminophen 325 mg tablet Commonly known as:  TYLENOL Your last dose was: Your next dose is: Take  by mouth every four (4) hours as needed for Pain. atomoxetine 100 mg capsule Commonly known as:  STRATTERA Your last dose was: Your next dose is: Take 100 mg by mouth daily. 100 mg  
    
   
   
   
  
 chlorproMAZINE 100 mg tablet Commonly known as:  THORAZINE Your last dose was: Your next dose is: Take 100 mg by mouth three (3) times daily as needed. 100 mg  
    
   
   
   
  
 divalproex  mg tablet Commonly known as:  DEPAKOTE Your last dose was: Your next dose is: Take  by mouth three (3) times daily. docusate sodium 100 mg capsule Commonly known as:  Roll Petrin Your last dose was: Your next dose is: Take 100 mg by mouth two (2) times a day. 100 mg LORazepam 2 mg tablet Commonly known as:  ATIVAN Your last dose was: Your next dose is: Take  by mouth every six (6) hours as needed for Anxiety. QUEtiapine 200 mg tablet Commonly known as:  SEROquel Your last dose was: Your next dose is: Take 200 mg by mouth three (3) times daily. 200 mg Where to Get Your Medications Information on where to get these meds will be given to you by the nurse or doctor. ! Ask your nurse or doctor about these medications  
  oxyCODONE-acetaminophen 5-325 mg per tablet Opioid Education Prescription Opioids: What You Need to Know: 
 
  
______________________________________________________________________ Anesthesia Discharge Instructions After general anesthesia or intervenous sedation, for 24 hours or while taking prescription Narcotics: · Limit your activities · Do not drive or operate hazardous machinery · If you have not urinated within 8 hours after discharge, please contact your surgeon on call. · Do not make important personal or business decisions · Do not drink alcoholic beverages Report the following to your surgeon: 
· Excessive pain, swelling, redness or odor of or around the surgical area · Temperature over 100.5 degrees · Nausea and vomiting lasting longer than 4 hours or if unable to take medication · Any signs of decreased circulation or nerve impairment to extremity:  Change in color, persistent numbness, tingling, coldness or increased pain. · Any questions 
 
______________________________________________________________________ Excision of cyst or soft tissue mass Instructions Following Excision of Cyst, Soft Tissue Mass Activity · As tolerated Diet · Clear liquids until no nausea or vomiting; then light diet for the first day · Advance to regular diet on second day, unless your doctor orders otherwise · If nausea and vomiting continues, call your doctor Pain · Take pain medication as directed by your doctor ·  Call your doctor if pain is NOT relieved by medication · DO NOT take aspirin or blood thinners until directed by your doctor Dressing Care: none needed, you may shower at home Follow-Up Phone Calls · Call will be made nursing staff · If you have any problems or concerns, call your doctor as needed Call your doctor if you experience: 
· Excessive bleeding that does not stop after holding mild pressure over the area · Temperature of 101° Fahrenheit or above · Redness, excessive swelling or bruising, and/or green or yellow, smelly discharge from incision After Anesthesia · For the first 24 hours: DO NOT drive, drink alcoholic beverages, or make important decisions · Be aware of dizziness following anesthesia and while taking pain medication Other Instructions: none Appointment date/time: Follow-up with Dr. Hiwot Jimenez in 2 week(s). Call the office to schedule your appointment. Your doctor's phone number: [phone] Anesthesia Discharge Instructions After general anesthesia or intervenous sedation, for 24 hours or while taking prescription Narcotics: · Limit your activities · Do not drive or operate hazardous machinery · If you have not urinated within 8 hours after discharge, please contact your surgeon on call. · Do not make important personal or business decisions · Do not drink alcoholic beverages Report the following to your surgeon: 
· Excessive pain, swelling, redness or odor of or around the surgical area · Temperature over 100.5 degrees · Nausea and vomiting lasting longer than 4 hours or if unable to take medication · Any signs of decreased circulation or nerve impairment to extremity:  Change in color, persistent numbness, tingling, coldness or increased pain. · Any questions Wound Care: After Your Visit Your Care Instructions Taking good care of your wound at home will help it heal quickly and reduce your chance of infection. The doctor has checked you carefully, but problems can develop later. If you notice any problems or new symptoms, get medical treatment right away. Follow-up care is a key part of your treatment and safety. Be sure to make and go to all appointments, and call your doctor if you are having problems. It's also a good idea to know your test results and keep a list of the medicines you take. How can you care for yourself at home? · Clean the area with soap and water 2 times a day. Don't use hydrogen peroxide or alcohol, which can slow healing. ¨ You may cover the wound with a nonstick bandage. · Take pain medicines exactly as directed. Some pain is normal with a wound, but do not ignore pain that is getting worse instead of better. You could have an infection. ¨ If the doctor gave you a prescription medicine for pain, take it as prescribed. ¨ If you are not taking a prescription pain medicine, ask your doctor if you can take an over-the-counter medicine. · Your doctor closed your wound with skin glue. ¨ Your wound was closed with skin glue, the glue will wear off in a few days to 2 weeks. When should you call for help? Call your doctor now or seek immediate medical care if: 
· You have signs of infection, such as: 
¨ Increased pain, swelling, warmth, or redness near the wound. ¨ Red streaks leading from the wound. ¨ Pus draining from the wound. ¨ A fever. · You bleed so much from your incision that you soak one or more bandages over 2 to 4 hours. Watch closely for changes in your health, and be sure to contact your doctor if: · The wound is not getting better each day. Where can you learn more? Go to Minggl.be Enter H042 in the search box to learn more about \"Wound Care: After Your Visit. \"  
© 2565-3649 Healthwise, Incorporated. Care instructions adapted under license by Robyn Brasher (which disclaims liability or warranty for this information). This care instruction is for use with your licensed healthcare professional. If you have questions about a medical condition or this instruction, always ask your healthcare professional. [address] any warranty or liability for your use of this information. Content Version: 16.5.508731; Last Revised: April 23, 2012 Introducing hospitals & [facility] SERVICES! Robyn Brasher introduces Nerve.com patient portal. Now you can access parts of your medical record, email your doctor's office, and request medication refills online. 1. In your internet browser, go to [email or web address] 2. Click on the First Time User? Click Here link in the Sign In box. You will see the New Member Sign Up page. 3. Enter your Nerve.com Access Code exactly as it appears below. You will not need to use this code after youve completed the sign-up process. If you do not sign up before the expiration date, you must request a new code. · Nerve.com Access Code: [email or web address] Expires: 5/29/2018  9:42 AM 
 4. Enter the last four digits of your Social Security Number (xxxx) and Date of Birth (mm/dd/yyyy) as indicated and click Submit. You will be taken to the next sign-up page. 5. Create a Cynergen ID. This will be your Cynergen login ID and cannot be changed, so think of one that is secure and easy to remember. 6. Create a Cynergen password. You can change your password at any time. 7. Enter your Password Reset Question and Answer. This can be used at a later time if you forget your password. 8. Enter your e-mail address. You will receive e-mail notification when new information is available in [address] 9. Click Sign Up. You can now view and download portions of your medical record. 10. Click the Download Summary menu link to download a portable copy of your medical information. If you have questions, please visit the Frequently Asked Questions section of the Cynergen website. Remember, Cynergen is NOT to be used for urgent needs. For medical emergencies, dial 911. Now available from your iPhone and Android! Introducing Josh Milan As a [employer] patient, I wanted to make you aware of our electronic visit tool called Josh ChavarriaVoalte. [employer] 24/7 allows you to connect within minutes with a medical provider 24 hours a day, seven days a week via a mobile device or tablet or logging into a secure website from your computer. You can access Josh Milan from anywhere in the [place]. A virtual visit might be right for you when you have a simple condition and feel like you just dont want to get out of bed, or cant get away from work for an appointment, when your regular [employer] provider is not available (evenings, weekends or holidays), or when youre out of town and need minor care.   Electronic visits cost only $49 and if the Josh Milan provider determines a prescription is needed to treat your condition, one can be electronically transmitted to a nearby pharmacy*. Please take a moment to enroll today if you have not already done so. The enrollment process is free and takes just a few minutes. To enroll, please download the EDUS 24/7 jamie to your tablet or phone, or visit [email or web address] to enroll on your computer. And, as an [address] [place] [address] patient with a bMenu account, the results of your visits will be scanned into your electronic medical record and your primary care provider will be able to view the scanned results. We urge you to continue to see your regular EDUS provider for your ongoing medical care. And while your primary care provider may not be the one available when you seek a Ringpay virtual visit, the peace of mind you get from getting a real diagnosis real time can be priceless. For more information on Ringpay, view our Frequently Asked Questions (FAQs) at [email or web address]. Sincerely, 
 
Ethan Holley MD 
Chief Medical Officer [facility] Marcia Sandoval *:  certain medications cannot be prescribed via Ringpay Providers Seen During Your Hospitalization Provider Specialty Primary [facility] phone [facility] [place], [address] [place] [address] Surgery [phone] Your Primary Care Physician (PCP) Primary Care Physician Office Phone Office Fax Lindsay Mock [phone] You are allergic to the following Allergen Reactions Bee Sting (Sting, Bee) Sneezing Coconut Oil Hives Mushroom Nausea and Vomiting Recent Documentation Height Weight BMI Smoking Status 1.753 m 72.1 kg 23.48 kg/m2 Current Every Day Smoker Emergency Contacts Name Discharge Info Relation Home Work Mobile Reji Man DISCHARGE CAREGIVER [3] Other Relative [6] [phone] Patient Belongings The following personal items are in your possession at time of discharge: 
  Dental Appliances: None Discharge Instructions Attachments/References MEFS - HYDROCODONE/ACETAMINOPHEN (VICODIN, [place], LORTAB) - (BY MOUTH) (ENGLISH) Patient Handouts Hydrocodone/Acetaminophen (Vicodin, Norco, Lortab) - (By mouth) Why this medicine is used:  
Treats pain. Contact a nurse or doctor right away if you have: · Blistering, peeling, red skin rash · Fast or slow heartbeat, shallow breathing, blue lips, fingernails, or skin · Anxiety, restlessness, muscle spasms, twitching, seeing or hearing things that are not there · Dark urine or pale stools, yellow skin or eyes · Extreme weakness, sweating, seizures, cold or clammy skin · Lightheadedness, dizziness, fainting, fever, sweating Common side effects: 
· Constipation, nausea, vomiting, loss of appetite, stomach pain · Tiredness or sleepiness © 2017 [facility] Information is for End User's use only and may not be sold, redistributed or otherwise used for commercial purposes. Please provide this summary of care documentation to your next provider. Signatures-by signing, you are acknowledging that this After Visit Summary has been reviewed with you and you have received a copy. Patient Signature:  ____________________________________________________________ Date:  ____________________________________________________________  
  
Krystina Smith Provider Signature:  ____________________________________________________________ Date:  ____________________________________________________________

## 2018-05-02 NOTE — OP NOTES
1500 [facility]  OPERATIVE REPORT    Keith Jamison  MR#: [id number]  : 1995  ACCOUNT #: [de-identified]   DATE OF SERVICE: 2018    PREOPERATIVE DIAGNOSIS:  Infected sebaceous cyst of the right thigh. POSTOPERATIVE DIAGNOSIS:  Infected sebaceous cyst of the right thigh. PROCEDURE PERFORMED:  Excision of sebaceous cyst of the right thigh. SURGEON:  Performed by Tiny Solano MD.    SURGICAL ASSISTANT:  Physician assistant, Elisa Tejada. INDICATION FOR OPERATION:  The patient is a 80-year-old male who has a sebaceous cyst of the right inferior thigh that has been previously drained due to infection. The infection is now cleared and is now ready for removal of the sebaceous cyst in the OR. DESCRIPTION OF THE OPERATION:  The patient was met in the preop holding area, the H and P was updated. Consent was signed. All risks and benefits were explained to the patient prior to start of the operation. He was taken back to the operating room. He was laid in a supine position. The abdomen was prepped and draped in standard sterile fashion. Timeout was called. Antibiotics were given. SCDs were on the lower extremities. We started the operation by making a 4 x 2 cm elliptical incision around the sebaceous cyst.  We dissected through the subcutaneous tissue, removing the sebaceous cyst with sharp dissection with the scissors from the soft tissue. The specimen was passed off the field. We then irrigated the wound with saline irrigation and closed the deep dermal layer with multiple interrupted 2-0 Vicryl sutures and closed the skin with a running 4-0 Monocryl and Dermabond to complete the operation. Dr. Javier Campos was present and scrubbed during the entire operation. The counts were correct. ANESTHESIA:  MAC.    ESTIMATED BLOOD LOSS:  None. SPECIMENS REMOVED:  Sebaceous cyst of the right thigh.       FINDINGS:  A 2 x 2 cm sebaceous cyst of the right thigh, excised. COMPLICATIONS:  None. IMPLANTS:  None.       MD YAJAIRA Henriquez / JEROMY  D: 05/01/2018 13:34     T: 05/01/2018 22:03  JOB #: [id number]

## 2018-05-14 ENCOUNTER — OFFICE VISIT (OUTPATIENT)
Dept: SURGERY | Age: 23
End: 2018-05-14

## 2018-05-14 VITALS
BODY MASS INDEX: 24.88 KG/M2 | TEMPERATURE: 97.3 F | HEART RATE: 113 BPM | RESPIRATION RATE: 20 BRPM | OXYGEN SATURATION: 97 % | HEIGHT: 69 IN | SYSTOLIC BLOOD PRESSURE: 125 MMHG | WEIGHT: 168 LBS | DIASTOLIC BLOOD PRESSURE: 82 MMHG

## 2018-05-14 DIAGNOSIS — Z09 POSTOPERATIVE EXAMINATION: Primary | ICD-10-CM

## 2018-05-14 NOTE — MR AVS SNAPSHOT
[address] [place] [address] 
[phone] Patient: Fco Moise MRN: [id number] :1995 Visit Information Date & Time Provider Department Dept. Phone Encounter #  
 2018 11:40 AM Logan Almendarez NP [place] [address] [phone] [id number] Your Appointments 5/15/2018 11:30 AM  
ROUTINE CARE with Lisbeth Lawrence MD  
[facility] [address] [place] [address]) Appt Note: Medication Evaluation; moved to earlier appt; confirmed with caretaker for -dac; r/s  
 401 [facility] E [facility] [address] [place] [address] 6050 [address] [facility] [address] Upcoming Health Maintenance Date Due Pneumococcal 19-64 Medium Risk (1 of 1 - PPSV23) 2014 DTaP/Tdap/Td series (1 - Tdap) 2016 Influenza Age 5 to Adult 2018 Allergies as of 2018  Review Complete On: 2018 By: Logan Almendarez NP Severity Noted Reaction Type Reactions Bee Sting [Sting, Bee]  10/31/2017    Sneezing Coconut Oil  10/31/2017    Hives Mushroom  10/31/2017    Nausea and Vomiting Current Immunizations  Never Reviewed No immunizations on file. Not reviewed this visit You Were Diagnosed With   
  
 Codes Comments Postoperative examination    -  Primary ICD-10-CM: I39 ICD-9-CM: V67.00 Vitals BP Pulse Temp Resp Height(growth percentile) Weight(growth percentile) 125/82 (BP 1 Location: Left arm, BP Patient Position: Sitting) (!) 113 97.3 °F (36.3 °C) (Oral) 20 5' 9\" (1.753 m) 168 lb (76.2 kg) SpO2 BMI Smoking Status 97% 24.81 kg/m2 Current Every Day Smoker BMI and BSA Data Body Mass Index Body Surface Area  
 24.81 kg/m 2 1.93 m 2 Preferred Pharmacy Pharmacy Name Phone [address] [place] [address] Drive [phone] Your Updated Medication List  
  
   
This list is accurate as of 5/14/18  1:32 PM.  Always use your most recent med list.  
  
  
  
  
 acetaminophen 325 mg tablet Commonly known as:  TYLENOL Take  by mouth every four (4) hours as needed for Pain. atomoxetine 100 mg capsule Commonly known as:  STRATTERA Take 100 mg by mouth daily. chlorproMAZINE 100 mg tablet Commonly known as:  THORAZINE Take 100 mg by mouth three (3) times daily as needed. divalproex  mg tablet Commonly known as:  DEPAKOTE Take  by mouth three (3) times daily. docusate sodium 100 mg capsule Commonly known as:  Elida Breech Take 100 mg by mouth two (2) times a day. LORazepam 2 mg tablet Commonly known as:  ATIVAN Take  by mouth every six (6) hours as needed for Anxiety. oxyCODONE-acetaminophen 5-325 mg per tablet Commonly known as:  PERCOCET Take 1 Tab by mouth every four (4) hours as needed for Pain. Max Daily Amount: 6 Tabs. QUEtiapine 200 mg tablet Commonly known as:  SEROquel Take 200 mg by mouth three (3) times daily. Patient Instructions Wound Care: After Your Visit Your Care Instructions Taking good care of your wound at home will help it heal quickly and reduce your chance of infection. The doctor has checked you carefully, but problems can develop later. If you notice any problems or new symptoms, get medical treatment right away. Follow-up care is a key part of your treatment and safety. Be sure to make and go to all appointments, and call your doctor if you are having problems. It's also a good idea to know your test results and keep a list of the medicines you take. How can you care for yourself at home? · Clean the area with soap and water 2 times a day unless your doctor gives you different instructions.  Don't use hydrogen peroxide or alcohol, which can slow healing. ¨ You may cover the wound with a thin layer of antibiotic ointment, such as bacitracin, and a nonstick bandage. ¨ Apply more ointment and replace the bandage as needed. · Take pain medicines exactly as directed. Some pain is normal with a wound, but do not ignore pain that is getting worse instead of better. You could have an infection. ¨ If the doctor gave you a prescription medicine for pain, take it as prescribed. ¨ If you are not taking a prescription pain medicine, ask your doctor if you can take an over-the-counter medicine. · Your doctor may have closed your wound with stitches (sutures), staples, or skin glue. ¨ If you have stitches, your doctor may remove them after several days to 2 weeks. Or you may have stitches that dissolve on their own. ¨ If you have staples, your doctor may remove them after 7 to 10 days. ¨ If your wound was closed with skin glue, the glue will wear off in a few days to 2 weeks. When should you call for help? Call your doctor now or seek immediate medical care if: 
· You have signs of infection, such as: 
¨ Increased pain, swelling, warmth, or redness near the wound. ¨ Red streaks leading from the wound. ¨ Pus draining from the wound. ¨ A fever. · You bleed so much from your incision that you soak one or more bandages over 2 to 4 hours. Watch closely for changes in your health, and be sure to contact your doctor if: · The wound is not getting better each day. Where can you learn more? Go to Caixin Media.be Enter O433 in the search box to learn more about \"Wound Care: After Your Visit. \"  
© 8724-0545 Healthwise, Incorporated. Care instructions adapted under license by Nicole Coronel (which disclaims liability or warranty for this information).  This care instruction is for use with your licensed healthcare professional. If you have questions about a medical condition or this instruction, always ask your healthcare professional. [place] [address] any warranty or liability for your use of this information. Content Version: 43.8.842020; Last Revised: April 23, 2012 Introducing [facility] & HEALTH SERVICES! Linnette Gutierrez introduces Big Six patient portal. Now you can access parts of your medical record, email your doctor's office, and request medication refills online. 1. In your internet browser, go to [email or web address] 2. Click on the First Time User? Click Here link in the Sign In box. You will see the New Member Sign Up page. 3. Enter your Big Six Access Code exactly as it appears below. You will not need to use this code after youve completed the sign-up process. If you do not sign up before the expiration date, you must request a new code. · Big Six Access Code: [email or web address] Expires: 5/29/2018  9:42 AM 
 
4. Enter the last four digits of your Social Security Number (xxxx) and Date of Birth (mm/dd/yyyy) as indicated and click Submit. You will be taken to the next sign-up page. 5. Create a Big Six ID. This will be your Big Six login ID and cannot be changed, so think of one that is secure and easy to remember. 6. Create a Big Six password. You can change your password at any time. 7. Enter your Password Reset Question and Answer. This can be used at a later time if you forget your password. 8. Enter your e-mail address. You will receive e-mail notification when new information is available in [address] 9. Click Sign Up. You can now view and download portions of your medical record. 10. Click the Download Summary menu link to download a portable copy of your medical information. If you have questions, please visit the Frequently Asked Questions section of the Big Six website. Remember, Big Six is NOT to be used for urgent needs. For medical emergencies, dial 911. Now available from your iPhone and Android! Please provide this summary of care documentation to your next provider. Your primary care clinician is listed as [address] [facility]. If you have any questions after today's visit, please call [phone].

## 2018-05-14 NOTE — PATIENT INSTRUCTIONS
Wound Care: After Your Visit  Your Care Instructions  Taking good care of your wound at home will help it heal quickly and reduce your chance of infection. The doctor has checked you carefully, but problems can develop later. If you notice any problems or new symptoms, get medical treatment right away. Follow-up care is a key part of your treatment and safety. Be sure to make and go to all appointments, and call your doctor if you are having problems. It's also a good idea to know your test results and keep a list of the medicines you take. How can you care for yourself at home? · Clean the area with soap and water 2 times a day unless your doctor gives you different instructions. Don't use hydrogen peroxide or alcohol, which can slow healing. ¨ You may cover the wound with a thin layer of antibiotic ointment, such as bacitracin, and a nonstick bandage. ¨ Apply more ointment and replace the bandage as needed. · Take pain medicines exactly as directed. Some pain is normal with a wound, but do not ignore pain that is getting worse instead of better. You could have an infection. ¨ If the doctor gave you a prescription medicine for pain, take it as prescribed. ¨ If you are not taking a prescription pain medicine, ask your doctor if you can take an over-the-counter medicine. · Your doctor may have closed your wound with stitches (sutures), staples, or skin glue. ¨ If you have stitches, your doctor may remove them after several days to 2 weeks. Or you may have stitches that dissolve on their own. ¨ If you have staples, your doctor may remove them after 7 to 10 days. ¨ If your wound was closed with skin glue, the glue will wear off in a few days to 2 weeks. When should you call for help? Call your doctor now or seek immediate medical care if:  · You have signs of infection, such as:  ¨ Increased pain, swelling, warmth, or redness near the wound. ¨ Red streaks leading from the wound.   ¨ Pus draining from the wound. ¨ A fever. · You bleed so much from your incision that you soak one or more bandages over 2 to 4 hours. Watch closely for changes in your health, and be sure to contact your doctor if:  · The wound is not getting better each day. Where can you learn more? Go to DoctorAtWork.com.be  Enter M973 in the search box to learn more about \"Wound Care: After Your Visit. \"   © 8339-4203 Healthwise, Incorporated. Care instructions adapted under license by [facility] (which disclaims liability or warranty for this information). This care instruction is for use with your licensed healthcare professional. If you have questions about a medical condition or this instruction, always ask your healthcare professional. [address] any warranty or liability for your use of this information. Content Version: 75.4.036699;  Last Revised: April 23, 2012

## 2018-05-14 NOTE — PROGRESS NOTES
Subjective:      Aiyana Ta is a 21 y.o. male who presents today for wound check. He is status post 13 days s/p Excision of Sebaceous Cyst of right thigh. Patient has an advanced directive: not on file. Mr. Jose J Merino has a reminder for a \"due or due soon\" health maintenance. I have asked that he contact his primary care provider for follow-up on this health maintenance. Objective:     Visit Vitals    /82 (BP 1 Location: Left arm, BP Patient Position: Sitting)    Pulse (!) 113    Temp 97.3 °F (36.3 °C) (Oral)    Resp 20    Ht 5' 9\" (1.753 m)    Wt 168 lb (76.2 kg)    SpO2 97%    BMI 24.81 kg/m2       Wound:   wound margins intact and healing well. No signs of infection. Assessment:     Wound check. Plan:     1. Follow up as needed  2. Path reviewed with patient  3. May get incision wet     Pt verbalized understanding and questions were answered to the best of my knowledge and ability. Eris Joy

## 2018-05-14 NOTE — PROGRESS NOTES
1. Have you been to the ER, urgent care clinic since your last visit? Hospitalized since your last visit? No    2. Have you seen or consulted any other health care providers outside of the [address] [place] [address] since your last visit? Include any pap smears or colon screening.  No

## 2018-08-30 ENCOUNTER — OFFICE VISIT (OUTPATIENT)
Dept: INTERNAL MEDICINE CLINIC | Age: 23
End: 2018-08-30

## 2018-08-30 VITALS
TEMPERATURE: 97.7 F | SYSTOLIC BLOOD PRESSURE: 132 MMHG | HEART RATE: 104 BPM | BODY MASS INDEX: 26.68 KG/M2 | HEIGHT: 69 IN | RESPIRATION RATE: 18 BRPM | OXYGEN SATURATION: 96 % | DIASTOLIC BLOOD PRESSURE: 94 MMHG | WEIGHT: 180.13 LBS

## 2018-08-30 DIAGNOSIS — K62.5 RECTAL BLEEDING: Primary | ICD-10-CM

## 2018-08-30 DIAGNOSIS — L02.424 BOIL OF LEFT ELBOW: ICD-10-CM

## 2018-08-30 RX ORDER — SULFAMETHOXAZOLE AND TRIMETHOPRIM 800; 160 MG/1; MG/1
1.5 TABLET ORAL 2 TIMES DAILY
Qty: 21 TAB | Refills: 0 | Status: SHIPPED | OUTPATIENT
Start: 2018-08-30 | End: 2018-09-06

## 2018-08-30 NOTE — MR AVS SNAPSHOT
[address] [place] [address] 
[phone] Patient: Veronica Joshi MRN: [id number] :1995 Visit Information Date & Time Provider Department Dept. Phone Encounter #  
 2018  1:30 PM Yuliet Aranda, [address] [place] [address], Ne and Internal Medicine [phone] [id number] Follow-up Instructions Return if symptoms worsen or fail to improve. Your Appointments 10/18/2018  2:15 PM  
PHYSICAL PRE OP with Yuliet Aranda MD  
[facility] Pediatrics and Internal Medicine [address] [facility]) Appt Note: Complete physical  
 [address] [facility] [address] [facility] [address]  
[address] [facility] [address] Upcoming Health Maintenance Date Due Pneumococcal 19-64 Medium Risk (1 of 1 - PPSV23) 2014 DTaP/Tdap/Td series (1 - Tdap) 2016 Influenza Age 5 to Adult 2018 Allergies as of 2018  Review Complete On: 2018 By: Yuliet Aranda MD  
  
 Severity Noted Reaction Type Reactions Bee Sting [Sting, Bee]  10/31/2017    Sneezing Coconut Oil  10/31/2017    Hives Mushroom  10/31/2017    Nausea and Vomiting Current Immunizations  Never Reviewed No immunizations on file. Not reviewed this visit You Were Diagnosed With   
  
 Codes Comments Rectal bleeding    -  Primary ICD-10-CM: K62.5 ICD-9-CM: 569.3 Boil of left elbow     ICD-10-CM: P28.072 ICD-9-CM: 680. 3 Vitals BP Pulse Temp Resp Height(growth percentile) Weight(growth percentile) (!) 132/94 (BP 1 Location: Right arm, BP Patient Position: Sitting) (!) 104 97.7 °F (36.5 °C) (Oral) 18 5' 9\" (1.753 m) 180 lb 2 oz (81.7 kg) SpO2 BMI Smoking Status 96% 26.6 kg/m2 Former Smoker BMI and BSA Data Body Mass Index Body Surface Area  
 26.6 kg/m 2 1.99 m 2 Preferred Pharmacy Pharmacy Name Phone [facility] [address] [phone] Your Updated Medication List  
  
   
This list is accurate as of 8/30/18  2:20 PM.  Always use your most recent med list.  
  
  
  
  
 acetaminophen 325 mg tablet Commonly known as:  TYLENOL Take  by mouth every four (4) hours as needed for Pain. atomoxetine 100 mg capsule Commonly known as:  STRATTERA Take 100 mg by mouth daily. chlorproMAZINE 100 mg tablet Commonly known as:  THORAZINE Take 100 mg by mouth three (3) times daily as needed. divalproex  mg tablet Commonly known as:  DEPAKOTE Take  by mouth three (3) times daily. docusate sodium 100 mg capsule Commonly known as:  Ltanya Duke Take 100 mg by mouth two (2) times a day. LORazepam 2 mg tablet Commonly known as:  ATIVAN Take  by mouth every six (6) hours as needed for Anxiety. QUEtiapine 200 mg tablet Commonly known as:  SEROquel Take 200 mg by mouth three (3) times daily. trimethoprim-sulfamethoxazole 160-800 mg per tablet Commonly known as:  BACTRIM DS, SEPTRA DS Take 1.5 Tabs by mouth two (2) times a day for 7 days. Prescriptions Sent to Pharmacy Refills  
 trimethoprim-sulfamethoxazole (BACTRIM DS, SEPTRA DS) 160-800 mg per tablet 0 Sig: Take 1.5 Tabs by mouth two (2) times a day for 7 days. Class: Normal  
 Pharmacy: [address] [place] [address] #: [phone] Route: Oral  
  
We Performed the Following REFERRAL TO GENERAL SURGERY [REF27 Custom] Comments:  
 Please evaluate for rectal bleeding--possible hemorhoid. Pt deferred exam and therapy with PCP. Follow-up Instructions Return if symptoms worsen or fail to improve. Referral Information Referral ID Referred By Referred To  
  
 [id number] CHILDRESS, Adeline Severin, MD   
   [address] [place] Gao Phone: [phone] Fax: [phone] Visits Status Start Date End Date 1 New Request 8/30/18 8/30/19 If your referral has a status of pending review or denied, additional information will be sent to support the outcome of this decision. Patient Instructions 1. There are topical creams or rectal medications that can help with rectal bleeding from possible hemorrhoids. You would need to allow someone to examine the area of bleeding to find clear cause. Please follow the following instructions to process/authorize your referral, if needed: 
 
Referrals processing Please verify with your insurance IF you need referral authorization submitted. For insurance plans which require this, please follow the following steps. FAILURE TO DO SO MAY RESULT IN INABILITY TO SEE THE SPECIALIST YOU HAVE BEEN REFERRED TO (once you are scheduled to see them). 1. Call and schedule appointment with specialist 
2. Call our clinic and leave message with provider name, and date of appointment 3. We will then submit the referral to your insurance. This process takes 2-5 business days. If you have questions about scheduling or authorizing referral, you can review with our referral coordinators Gt Coving[place] or Toña Calles) at the . You can review with them today if available/if you have time, or you can call to review with them once you have made your referral/appointment. If you are not sure if you need referral authorizations, please review with the referral coordinators, either prior to or after you have made the appointment, as reviewed. 2.  If the boil of your left elbow if not improving on the antibiotic, please return to review or consider alternative medication. Introducing [facility] HE[facility]!    
 [facility] introduces [employer] patient portal. Now you can access parts of your medical record, email your doctor's office, and request medication refills online. 1. In your internet browser, go to [email or web address] 2. Click on the First Time User? Click Here link in the Sign In box. You will see the New Member Sign Up page. 3. Enter your Saranas Access Code exactly as it appears below. You will not need to use this code after youve completed the sign-up process. If you do not sign up before the expiration date, you must request a new code. · Saranas Access Code: [email or web address] Expires: 11/28/2018  2:20 PM 
 
4. Enter the last four digits of your Social Security Number (xxxx) and Date of Birth (mm/dd/yyyy) as indicated and click Submit. You will be taken to the next sign-up page. 5. Create a Saranas ID. This will be your Saranas login ID and cannot be changed, so think of one that is secure and easy to remember. 6. Create a Saranas password. You can change your password at any time. 7. Enter your Password Reset Question and Answer. This can be used at a later time if you forget your password. 8. Enter your e-mail address. You will receive e-mail notification when new information is available in [address] 9. Click Sign Up. You can now view and download portions of your medical record. 10. Click the Download Summary menu link to download a portable copy of your medical information. If you have questions, please visit the Frequently Asked Questions section of the Saranas website. Remember, Saranas is NOT to be used for urgent needs. For medical emergencies, dial 911. Now available from your iPhone and Android! Please provide this summary of care documentation to your next provider. Your primary care clinician is listed as [address]. If you have any questions after today's visit, please call [phone].

## 2018-08-30 NOTE — PROGRESS NOTES
RM 16 Chief Complaint Patient presents with  Anal Bleeding Patient reports blood in stool, noticed a couple of nights ago. Notices blood everytime he has a bowel movement.  Mass Patient reports bump to left arm, noticed on Monday. Reports areas itches and getting bigger. 1. Have you been to the ER, urgent care clinic since your last visit? Hospitalized since your last visit? No 
 
2. Have you seen or consulted any other health care providers outside of the [address] [place] [address] since your last visit? Include any pap smears or colon screening. Yes Reason for visit: Psychiatrist, 8/29/18, med eval  
 
Health Maintenance Due Topic Date Due  Pneumococcal 19-64 Medium Risk (1 of 1 - PPSV23) 04/28/2014  
 DTaP/Tdap/Td series (1 - Tdap) 04/28/2016  Influenza Age 5 to Adult  08/01/2018 PHQ over the last two weeks 8/30/2018 PHQ Not Done - Little interest or pleasure in doing things Not at all Feeling down, depressed, irritable, or hopeless Not at all Total Score PHQ 2 0

## 2018-08-30 NOTE — PATIENT INSTRUCTIONS
1.  There are topical creams or rectal medications that can help with rectal bleeding from possible hemorrhoids. You would need to allow someone to examine the area of bleeding to find clear cause. Please follow the following instructions to process/authorize your referral, if needed: 
 
Referrals processing Please verify with your insurance IF you need referral authorization submitted. For insurance plans which require this, please follow the following steps. FAILURE TO DO SO MAY RESULT IN INABILITY TO SEE THE SPECIALIST YOU HAVE BEEN REFERRED TO (once you are scheduled to see them). 1. Call and schedule appointment with specialist 
2. Call our clinic and leave message with provider name, and date of appointment 3. We will then submit the referral to your insurance. This process takes 2-5 business days. If you have questions about scheduling or authorizing referral, you can review with our referral coordinators Fam Trujillo. or August Ellison) at the . You can review with them today if available/if you have time, or you can call to review with them once you have made your referral/appointment. If you are not sure if you need referral authorizations, please review with the referral coordinators, either prior to or after you have made the appointment, as reviewed. 2.  If the boil of your left elbow if not improving on the antibiotic, please return to review or consider alternative medication.

## 2018-08-30 NOTE — PROGRESS NOTES
History of Present Illness:  
Chinedu Epperson is a 21 y.o. male here for evaluation: Chief Complaint Patient presents with  Anal Bleeding Patient reports blood in stool, noticed a couple of nights ago. Notices blood everytime he has a bowel movement.  Mass Patient reports bump to left arm, noticed on Monday. Reports areas itches and getting bigger. Group home staff not with pt at visit. Here to eval above. Notes with BM and blood mixed in stool No trauma noted. No prior history of rectal bleeding noted. No topical meds used OTC in interim. He would like to see Dr. Lizzie Scott for evaluation possible bleeding hemorrhoids. He would not like to try any meds in interim. He does not want to do rectal exam/eval today during visit. Notes lesion on left arm. He has treated with peroxide. Started/noted on Monday 8/27. No drainage--not like pimple. He tolerated Bactrim prior at two DS tabs BID. Reviewed dosing with pt at visit. Prior to Admission medications Medication Sig Start Date End Date Taking? Authorizing Provider  
oxyCODONE-acetaminophen (PERCOCET) 5-325 mg per tablet Take 1 Tab by mouth every four (4) hours as needed for Pain. Max Daily Amount: 6 Tabs. 5/1/18  Yes Luzmaria Birmingham MD  
LORazepam (ATIVAN) 2 mg tablet Take  by mouth every six (6) hours as needed for Anxiety. Yes Historical Provider  
chlorproMAZINE (THORAZINE) 100 mg tablet Take 100 mg by mouth three (3) times daily as needed. Yes Historical Provider  
acetaminophen (TYLENOL) 325 mg tablet Take  by mouth every four (4) hours as needed for Pain. Yes Historical Provider  
atomoxetine (STRATTERA) 100 mg capsule Take 100 mg by mouth daily. Yes Historical Provider  
docusate sodium (COLACE) 100 mg capsule Take 100 mg by mouth two (2) times a day. Yes Historical Provider QUEtiapine (SEROQUEL) 200 mg tablet Take 200 mg by mouth three (3) times daily. Yes Historical Provider divalproex DR (DEPAKOTE) 500 mg tablet Take  by mouth three (3) times daily. Yes Historical Provider Notes meds above different--changed yesterday. No current med list with pt at visit. Group home contact not available to clarify for pt during visit. ROS Vitals:  
 08/30/18 1344 BP: (!) 132/94 Pulse: (!) 104 Resp: 18 Temp: 97.7 °F (36.5 °C) TempSrc: Oral  
SpO2: 96% Weight: 180 lb 2 oz (81.7 kg) Height: 5' 9\" (1.753 m) PainSc:   0 - No pain Body mass index is 26.6 kg/(m^2). Physical Exam:  
 
Physical Exam  
Constitutional: He appears well-developed and well-nourished. No distress. HENT:  
Head: Normocephalic and atraumatic. Eyes: Conjunctivae are normal. Right eye exhibits no discharge. Left eye exhibits no discharge. No scleral icterus. Cardiovascular: Normal rate, regular rhythm, normal heart sounds and intact distal pulses. Exam reveals no gallop and no friction rub. No murmur heard. Pulmonary/Chest: Effort normal and breath sounds normal. No respiratory distress. He has no wheezes. He has no rales. Abdominal: Soft. Bowel sounds are normal. He exhibits no distension. There is no tenderness. Genitourinary:  
Genitourinary Comments: Rectal exam deferred by pt. Musculoskeletal: He exhibits no edema. Arms: 
Neurological: He is alert. He exhibits normal muscle tone. Coordination normal.  
Skin: Skin is warm. Rash noted. He is not diaphoretic. No erythema. No pallor. Psychiatric: He has a normal mood and affect. His behavior is normal. Judgment and thought content normal.  
 
 
Assessment and Plan: ICD-10-CM ICD-9-CM 1. Rectal bleeding K62.5 569.3 REFERRAL TO GENERAL SURGERY 2. Boil of left elbow L02.424 680.3 trimethoprim-sulfamethoxazole (BACTRIM DS, SEPTRA DS) 160-800 mg per tablet 1. Reviewed evaluation at visit. Reviewed options for therapy and evaluation, but not interested in exam or mgt at this time. 2.  Therapy reviewed. Plan Bactrim DS 1.5 tabs BID as above. Follow-up Disposition: 
Return if symptoms worsen or fail to improve. reviewed diet, exercise and weight control 
reviewed medications and side effects in detail For additional documentation of information and/or recommendations discussed this visit, please see notes in instructions. Plan and evaluation (above) reviewed with pt at visit Patient voiced understanding of plan and provided with time to ask/review questions. After Visit Summary (AVS) provided to pt after visit with additional instructions as needed/reviewed.

## 2018-09-12 ENCOUNTER — OFFICE VISIT (OUTPATIENT)
Dept: SURGERY | Age: 23
End: 2018-09-12

## 2018-09-12 VITALS
TEMPERATURE: 97.9 F | RESPIRATION RATE: 20 BRPM | OXYGEN SATURATION: 98 % | HEART RATE: 95 BPM | BODY MASS INDEX: 27.11 KG/M2 | WEIGHT: 183 LBS | DIASTOLIC BLOOD PRESSURE: 80 MMHG | SYSTOLIC BLOOD PRESSURE: 120 MMHG | HEIGHT: 69 IN

## 2018-09-12 DIAGNOSIS — K62.5 ANAL BLEEDING: Primary | ICD-10-CM

## 2018-09-12 RX ORDER — POLYETHYLENE GLYCOL 3350 17 G/17G
17 POWDER, FOR SOLUTION ORAL DAILY
Qty: 238 G | Refills: 0 | Status: SHIPPED | OUTPATIENT
Start: 2018-09-12 | End: 2018-10-08 | Stop reason: SDUPTHER

## 2018-09-12 RX ORDER — OLANZAPINE 5 MG/1
2.5 TABLET ORAL
COMMUNITY
End: 2019-09-19 | Stop reason: DRUGHIGH

## 2018-09-12 NOTE — PROGRESS NOTES
1. Have you been to the ER, urgent care clinic since your last visit? Hospitalized since your last visit? No    2. Have you seen or consulted any other health care providers outside of the [facility] since your last visit? Include any pap smears or colon screening. No    Patient states he has no longer been having blood in his stool since he is no longer constipated.

## 2018-09-12 NOTE — LETTER
9/12/2018 4:23 PM 
 
Patient:  Mercedes Rolle YOB: 1995 Date of Visit: 9/12/2018 Dear Feliberto Pope MD 
[address] [place] [address] VIA In Basket 
 : Thank you for referring Mr. Mercedes Rolle to me for evaluation/treatment. Below are the relevant portions of my assessment and plan of care. If you have questions, please do not hesitate to call me. I look forward to following Mr. Paige along with you. Sincerely, Juan Morelos MD

## 2018-09-12 NOTE — PROGRESS NOTES
[facility] General Surgery History and Physical    History of Present Illness:      Ena Fishman is a 21 y.o. male who has been having some issues with blood in his BM. He has been having some constipation at his group home. The blood in the BM appears to be more with a large BM. The blood is red and not dark. He notices it in the toilet and when he wipes. He does not have any pain or bleeding otherwise. He has a BM every 2-3 days. Past Medical History:   Diagnosis Date    Asthma     Seizures ([address]     last seizure 2001       Past Surgical History:   Procedure Laterality Date    HX OTHER SURGICAL Right 05/01/2018    Excision of Sebaceous cyst of right thigh by Dr. Beny Saavedra. Current Outpatient Prescriptions:     OLANZapine (ZYPREXA) 5 mg tablet, Take  by mouth nightly., Disp: , Rfl:     polyethylene glycol (MIRALAX) 17 gram/dose powder, Take 17 g by mouth daily. , Disp: 238 g, Rfl: 0    LORazepam (ATIVAN) 2 mg tablet, Take  by mouth every six (6) hours as needed for Anxiety. , Disp: , Rfl:     acetaminophen (TYLENOL) 325 mg tablet, Take  by mouth every four (4) hours as needed for Pain., Disp: , Rfl:     atomoxetine (STRATTERA) 100 mg capsule, Take 100 mg by mouth daily. , Disp: , Rfl:     QUEtiapine (SEROQUEL) 200 mg tablet, Take 200 mg by mouth three (3) times daily. , Disp: , Rfl:     divalproex DR (DEPAKOTE) 500 mg tablet, Take  by mouth three (3) times daily. , Disp: , Rfl:     chlorproMAZINE (THORAZINE) 100 mg tablet, Take 100 mg by mouth three (3) times daily as needed. , Disp: , Rfl:     docusate sodium (COLACE) 100 mg capsule, Take 100 mg by mouth two (2) times a day., Disp: , Rfl:     Allergies   Allergen Reactions    Bee Sting [Sting, Bee] Sneezing    Coconut Oil Hives    Mushroom Nausea and Vomiting       Social History     Social History    Marital status: SINGLE     Spouse name: N/A    Number of children: N/A    Years of education: N/A     Occupational History    Not on file.     Social History Main Topics    Smoking status: Former Smoker     Packs/day: 1.00    Smokeless tobacco: Never Used    Alcohol use No      Comment: per week    Drug use: No    Sexual activity: Yes     Partners: Female     Birth control/ protection: None     Other Topics Concern    Not on file     Social History Narrative       No family history on file. ROS   Constitutional: negative  Ears, Nose, Mouth, Throat, and Face: negative  Respiratory: negative  Cardiovascular: negative  Gastrointestinal: positive for blood in BM  Genitourinary:negative  Integument/Breast: negative  Hematologic/Lymphatic: negative  Behavioral/Psychiatric: negative  Allergic/Immunologic: negative      Physical Exam:     Visit Vitals    /80    Pulse 95    Temp 97.9 °F (36.6 °C)    Resp 20    Ht 5' 9\" (1.753 m)    Wt 183 lb (83 kg)    SpO2 98%    BMI 27.02 kg/m2       General - alert and oriented, no apparent distress  HEENT - no jaundice, no hearing imparement  Pulm - CTAB, no C/W/R  CV - RRR, no M/R/G  Abd - soft, ND, BS Present, NTTP, no hernia  Rectal - refused  Ext - pulses intact in UE and LE bilaterally, no edema  Skin - supple, no rashes  Psychiatric - normal affect, good mood    Labs  none    Imaging  none  I have reviewed and agree with all of the pertinent images    Assessment:     Gaudencio Pro is a 21 y.o. male with anal bleeding    Recommendations:     1. He is likely have some blood in his BM from the trauma of a hard and large stool with constipation. He only has BM every 2-3 days and likely has to push hard. He would not let me do a rectal exam so I could not see if he has any hemorrhoids or masses. I think it is unlikely that there are any masses and if he has hemorrhoids then they are small and should improve with the constipation. He will take miralax daily at home and follow up PRN. Ganesh Vera MD     Gerardoronit Jon has a reminder for a \"due or due soon\" health maintenance.  I have asked that he contact his primary care provider for follow-up on this health maintenance.

## 2018-10-08 RX ORDER — POLYETHYLENE GLYCOL 3350 17 G/17G
POWDER, FOR SOLUTION ORAL
Qty: 510 G | Refills: 0 | Status: SHIPPED | OUTPATIENT
Start: 2018-10-08 | End: 2018-12-08 | Stop reason: SDUPTHER

## 2018-10-08 NOTE — TELEPHONE ENCOUNTER
He was prescribed Miralax by surgery for constipation after eval of rectal bleeding there. Note reviewed from Dr. Zheng . Refilled Miralax for single 30-day supply. He should return to re-evaluate response to medication here as scheduled below.     Future Appointments  Date Time Provider Juana Monterio   10/18/2018 2:15 PM Kala Lisa MD [address] [facility]

## 2018-10-18 ENCOUNTER — OFFICE VISIT (OUTPATIENT)
Dept: INTERNAL MEDICINE CLINIC | Age: 23
End: 2018-10-18

## 2018-10-18 VITALS
SYSTOLIC BLOOD PRESSURE: 134 MMHG | BODY MASS INDEX: 27.11 KG/M2 | HEIGHT: 69 IN | DIASTOLIC BLOOD PRESSURE: 93 MMHG | WEIGHT: 183 LBS | TEMPERATURE: 97.9 F | HEART RATE: 111 BPM | RESPIRATION RATE: 18 BRPM | OXYGEN SATURATION: 96 %

## 2018-10-18 DIAGNOSIS — Z00.00 WELL ADULT EXAM: Primary | ICD-10-CM

## 2018-10-18 DIAGNOSIS — Z01.00 VISION TEST: ICD-10-CM

## 2018-10-18 NOTE — PROGRESS NOTES
History of Present Illness:  
Allie Black is a 21 y.o. male here for evaluation: Chief Complaint Patient presents with  Complete Physical  
 
Here for physical. 
 
Has form for [employer], but he notes he is not doing [employer], just requests form completed for physical. 
 
No specific concerns noted. No vaccine records. Not interested in vaccines at visit, including influenza. Notes no MSK concerns or problems with mobility. He is not doing specific sport, just has form to complete as requested above. He has glasses, but not wearing today--he is working to get adjusted. Visual Acuity Screening Right eye Left eye Both eyes Without correction: 20/30 20/20 20/20 With correction:     
 
 
Depression screening updated and reviewed by provider at visit: PHQ over the last two weeks 10/18/2018 PHQ Not Done - Little interest or pleasure in doing things Not at all Feeling down, depressed, irritable, or hopeless Not at all Total Score PHQ 2 0 Past Medical History:  
Diagnosis Date  Asthma  Seizures ([facility] [address]   
 last seizure 2001 Prior to Admission medications Medication Sig Start Date End Date Taking? Authorizing Provider  
polyethylene glycol (MIRALAX) 17 gram/dose powder MIX 1 CAPFUL (SEE 17 gm LINE) IN A GLASS OF WATER & DRINK ONCE DAILY FOR CONSTIPATION. 10/8/18  Yes Marsha Khoury MD  
OLANZapine (ZYPREXA) 5 mg tablet Take  by mouth nightly. Yes Provider, Historical  
LORazepam (ATIVAN) 2 mg tablet Take  by mouth every six (6) hours as needed for Anxiety. Yes Provider, Historical  
chlorproMAZINE (THORAZINE) 100 mg tablet Take 100 mg by mouth three (3) times daily as needed. Yes Provider, Historical  
acetaminophen (TYLENOL) 325 mg tablet Take  by mouth every four (4) hours as needed for Pain. Yes Provider, Historical  
atomoxetine (STRATTERA) 100 mg capsule Take 100 mg by mouth daily.    Yes Provider, Historical  
 QUEtiapine (SEROQUEL) 200 mg tablet Take 200 mg by mouth three (3) times daily. Yes Provider, Historical  
divalproex DR (DEPAKOTE) 500 mg tablet Take  by mouth three (3) times daily. Yes Provider, Historical  
docusate sodium (COLACE) 100 mg capsule Take 100 mg by mouth two (2) times a day. Provider, Historical  
  
 
ROS Vitals:  
 10/18/18 1441 10/18/18 1522 BP: (!) 140/95 (!) 134/93 Pulse: (!) 111 (!) 111 Resp: 18 Temp: 97.9 °F (36.6 °C) TempSrc: Oral   
SpO2: 96% Weight: 183 lb (83 kg) Height: 5' 9\" (1.753 m) PainSc:   0 - No pain Body mass index is 27.02 kg/m². Physical Exam:  
 
Physical Exam  
Constitutional: He appears well-developed and well-nourished. No distress. HENT:  
Head: Normocephalic and atraumatic. Right Ear: External ear normal.  
Left Ear: External ear normal.  
Mouth/Throat: Oropharynx is clear and moist. No oropharyngeal exudate. TM's normal bilat. Hearing screening completed as per form--normal.  
Eyes: Conjunctivae are normal. Pupils are equal, round, and reactive to light. Right eye exhibits no discharge. Left eye exhibits no discharge. No scleral icterus. Neck: Normal range of motion. Neck supple. No tracheal deviation present. No thyromegaly present. Cardiovascular: Normal rate, regular rhythm, normal heart sounds and intact distal pulses. Exam reveals no gallop and no friction rub. No murmur heard. Pulmonary/Chest: Effort normal and breath sounds normal. No stridor. No respiratory distress. He has no wheezes. He has no rales. Abdominal: Soft. Bowel sounds are normal. He exhibits no distension and no mass. There is no tenderness. There is no rebound and no guarding. Genitourinary:  
Genitourinary Comments: Pt deferred. No concerns noted. Musculoskeletal: He exhibits no edema, tenderness or deformity. Midline spine. No CVA tenderness noted bilaterally. Lymphadenopathy:  
  He has no cervical adenopathy. Neurological: He is alert. He exhibits normal muscle tone. Coordination normal.  
Skin: Skin is warm. No rash noted. He is not diaphoretic. No erythema. No pallor. Psychiatric: He has a normal mood and affect. Judgment and thought content normal.  
Somewhat impatient during visit, but cooperative with exam and history. Assessment and Plan: ICD-10-CM ICD-9-CM 1. Well adult exam Z00.00 V70.0 2. Vision test Z01.00 V72.0 AMB POC VISUAL ACUITY SCREEN 1.  Physical form and additional visit documentation form completed. Follow-up Disposition: 
Return in about 1 year (around 10/18/2019), or if symptoms worsen or fail to improve, for yearly physical. 
reviewed medications and side effects in detail For additional documentation of information and/or recommendations discussed this visit, please see notes in instructions. Plan and evaluation (above) reviewed with pt at visit Patient voiced understanding of plan and provided with time to ask/review questions. After Visit Summary (AVS) provided to pt after visit with additional instructions as needed/reviewed. Note:  Patient is not interested in immunization with influenza vaccine this season. Postponed in health maintenance at visit.

## 2018-10-18 NOTE — PATIENT INSTRUCTIONS
Please have the immunization records faxed to [phone], as reviewed/requested. We do not have any immunization records on file for you in your records here. Well Visit, Ages 25 to 48: Care Instructions Your Care Instructions Physical exams can help you stay healthy. Your doctor has checked your overall health and may have suggested ways to take good care of yourself. He or she also may have recommended tests. At home, you can help prevent illness with healthy eating, regular exercise, and other steps. Follow-up care is a key part of your treatment and safety. Be sure to make and go to all appointments, and call your doctor if you are having problems. It's also a good idea to know your test results and keep a list of the medicines you take. How can you care for yourself at home? · Reach and stay at a healthy weight. This will lower your risk for many problems, such as obesity, diabetes, heart disease, and high blood pressure. · Get at least 30 minutes of physical activity on most days of the week. Walking is a good choice. You also may want to do other activities, such as running, swimming, cycling, or playing tennis or team sports. Discuss any changes in your exercise program with your doctor. · Do not smoke or allow others to smoke around you. If you need help quitting, talk to your doctor about stop-smoking programs and medicines. These can increase your chances of quitting for good. · Talk to your doctor about whether you have any risk factors for sexually transmitted infections (STIs). Having one sex partner (who does not have STIs and does not have sex with anyone else) is a good way to avoid these infections. · Use birth control if you do not want to have children at this time. Talk with your doctor about the choices available and what might be best for you. · Protect your skin from too much sun. When you're outdoors from 10 a.m. to 4 p.m., stay in the shade or cover up with clothing and a hat with a wide brim. Wear sunglasses that block UV rays. Even when it's cloudy, put broad-spectrum sunscreen (SPF 30 or higher) on any exposed skin. · See a dentist one or two times a year for checkups and to have your teeth cleaned. · Wear a seat belt in the car. · Drink alcohol in moderation, if at all. That means no more than 2 drinks a day for men and 1 drink a day for women. Follow your doctor's advice about when to have certain tests. These tests can spot problems early. For everyone · Cholesterol. Have the fat (cholesterol) in your blood tested after age 21. Your doctor will tell you how often to have this done based on your age, family history, or other things that can increase your risk for heart disease. · Blood pressure. Have your blood pressure checked during a routine doctor visit. Your doctor will tell you how often to check your blood pressure based on your age, your blood pressure results, and other factors. · Vision. Talk with your doctor about how often to have a glaucoma test. 
· Diabetes. Ask your doctor whether you should have tests for diabetes. · Colon cancer. Have a test for colon cancer at age 48. You may have one of several tests. If you are younger than 48, you may need a test earlier if you have any risk factors. Risk factors include whether you already had a precancerous polyp removed from your colon or whether your parent, brother, sister, or child has had colon cancer. For women · Breast exam and mammogram. Talk to your doctor about when you should have a clinical breast exam and a mammogram. Medical experts differ on whether and how often women under 50 should have these tests. Your doctor can help you decide what is right for you. · Pap test and pelvic exam. Begin Pap tests at age 24. A Pap test is the best way to find cervical cancer.  The test often is part of a pelvic exam. Ask how often to have this test. 
 · Tests for sexually transmitted infections (STIs). Ask whether you should have tests for STIs. You may be at risk if you have sex with more than one person, especially if your partners do not wear condoms. For men · Tests for sexually transmitted infections (STIs). Ask whether you should have tests for STIs. You may be at risk if you have sex with more than one person, especially if you do not wear a condom. · Testicular cancer exam. Ask your doctor whether you should check your testicles regularly. · Prostate exam. Talk to your doctor about whether you should have a blood test (called a PSA test) for prostate cancer. Experts differ on whether and when men should have this test. Some experts suggest it if you are older than 39 and are -American or have a father or brother who got prostate cancer when he was younger than 72. When should you call for help? Watch closely for changes in your health, and be sure to contact your doctor if you have any problems or symptoms that concern you. Where can you learn more? Go to http://genna-chester.info/. Enter P072 in the search box to learn more about \"Well Visit, Ages 25 to 48: Care Instructions. \" Current as of: March 29, 2018 Content Version: 11.8 © 7864-3444 [employer]. Care instructions adapted under license by [employer] (which disclaims liability or warranty for this information). If you have questions about a medical condition or this instruction, always ask your healthcare professional. [place] [address] any warranty or liability for your use of this information.

## 2018-10-18 NOTE — PROGRESS NOTES
RM 18 Chief Complaint Patient presents with  Complete Physical  
 
1. Have you been to the ER, urgent care clinic since your last visit? Hospitalized since your last visit? No 
 
2. Have you seen or consulted any other health care providers outside of the [address] [place] [address] since your last visit? Include any pap smears or colon screening. No 
 
 
Patient does not want flu vaccine. Health Maintenance Due Topic Date Due  
 DTaP/Tdap/Td series (1 - Tdap) 04/28/2016  Influenza Age 5 to Adult  08/01/2018 PHQ over the last two weeks 10/18/2018 PHQ Not Done - Little interest or pleasure in doing things Not at all Feeling down, depressed, irritable, or hopeless Not at all Total Score PHQ 2 0 Visual Acuity Screening Right eye Left eye Both eyes Without correction: 20/30 20/20 20/20 With correction:

## 2018-11-27 ENCOUNTER — OFFICE VISIT (OUTPATIENT)
Dept: INTERNAL MEDICINE CLINIC | Age: 23
End: 2018-11-27

## 2018-11-27 VITALS
RESPIRATION RATE: 16 BRPM | WEIGHT: 197 LBS | BODY MASS INDEX: 29.18 KG/M2 | HEIGHT: 69 IN | DIASTOLIC BLOOD PRESSURE: 90 MMHG | TEMPERATURE: 97.9 F | HEART RATE: 110 BPM | SYSTOLIC BLOOD PRESSURE: 135 MMHG | OXYGEN SATURATION: 97 %

## 2018-11-27 DIAGNOSIS — J06.9 UPPER RESPIRATORY TRACT INFECTION, UNSPECIFIED TYPE: Primary | ICD-10-CM

## 2018-11-27 DIAGNOSIS — J40 BRONCHITIS: ICD-10-CM

## 2018-11-27 DIAGNOSIS — Z87.891 EX-SMOKER: ICD-10-CM

## 2018-11-27 RX ORDER — AMOXICILLIN AND CLAVULANATE POTASSIUM 875; 125 MG/1; MG/1
1 TABLET, FILM COATED ORAL 2 TIMES DAILY
Qty: 20 TAB | Refills: 0 | Status: SHIPPED | OUTPATIENT
Start: 2018-11-27 | End: 2018-12-07

## 2018-11-27 RX ORDER — BROMPHENIRAMINE MALEATE, PSEUDOEPHEDRINE HYDROCHLORIDE, AND DEXTROMETHORPHAN HYDROBROMIDE 2; 30; 10 MG/5ML; MG/5ML; MG/5ML
5 SYRUP ORAL
Qty: 118 ML | Status: SHIPPED | OUTPATIENT
Start: 2018-11-27 | End: 2019-07-03

## 2018-11-27 NOTE — PROGRESS NOTES
HISTORY OF PRESENT ILLNESS  Alexy Brink is a 21 y.o. male group home patient presents for acute care  HPI      for several day  Cough, sputum green  Runny nose    Stopped smoking a couple months ago was 1 cigarette a day    No know sick contact    Past Medical History:   Diagnosis Date    Asthma     Seizures ([address]     last seizure 2001       Current Outpatient Medications on File Prior to Visit   Medication Sig Dispense Refill    polyethylene glycol (MIRALAX) 17 gram/dose powder MIX 1 CAPFUL (SEE 17 gm LINE) IN A GLASS OF WATER & DRINK ONCE DAILY FOR CONSTIPATION. 510 g 0    OLANZapine (ZYPREXA) 5 mg tablet Take 2.5 mg by mouth nightly.  LORazepam (ATIVAN) 2 mg tablet Take  by mouth every six (6) hours as needed for Anxiety.  acetaminophen (TYLENOL) 325 mg tablet Take  by mouth every four (4) hours as needed for Pain.  atomoxetine (STRATTERA) 100 mg capsule Take 100 mg by mouth daily.  QUEtiapine (SEROQUEL) 200 mg tablet Take 300 mg by mouth three (3) times daily.  divalproex DR (DEPAKOTE) 500 mg tablet Take  by mouth three (3) times daily.  chlorproMAZINE (THORAZINE) 100 mg tablet Take 100 mg by mouth three (3) times daily as needed.  docusate sodium (COLACE) 100 mg capsule Take 100 mg by mouth two (2) times a day. No current facility-administered medications on file prior to visit. Review of Systems   Constitutional: Positive for chills. Negative for fever. HENT: Positive for congestion, sinus pain and sore throat. Eyes: Negative. Respiratory: Positive for cough and sputum production. Negative for shortness of breath and wheezing. Cardiovascular: Negative. Gastrointestinal: Negative. Genitourinary: Negative. Neurological: Negative for dizziness and headaches.      Visit Vitals  /90 (BP 1 Location: Left arm, BP Patient Position: Sitting)   Pulse (!) 110   Temp 97.9 °F (36.6 °C) (Oral)   Resp 16   Ht 5' 9\" (1.753 m)   Wt 197 lb (89.4 kg)   SpO2 97%   BMI 29.09 kg/m²     Physical Exam   HENT:   Right Ear: External ear normal.   Left Ear: External ear normal.   Nose: Mucosal edema and rhinorrhea present. Right sinus exhibits maxillary sinus tenderness. Left sinus exhibits maxillary sinus tenderness. Mouth/Throat: Oropharynx is clear and moist. No oropharyngeal exudate. Eyes: Conjunctivae are normal. Right eye exhibits no discharge. Left eye exhibits no discharge. Neck: Normal range of motion. Neck supple. Cardiovascular: Normal rate and regular rhythm. Pulmonary/Chest: Effort normal and breath sounds normal. No respiratory distress. He has no wheezes. Lymphadenopathy:     He has no cervical adenopathy. Neurological: He is alert. Skin: Skin is warm and dry. No rash noted. No erythema. Psychiatric: He is withdrawn. Cognition and memory are normal. He is inattentive. ASSESSMENT and PLAN    ICD-10-CM ICD-9-CM    1. Upper respiratory tract infection, unspecified type J06.9 465.9 Brompheniramine-Pseudoeph-DM (BROMFED DM) 2-30-10 mg/5 mL syrup      amoxicillin-clavulanate (AUGMENTIN) 875-125 mg per tablet   2. Bronchitis J40 490 Brompheniramine-Pseudoeph-DM (BROMFED DM) 2-30-10 mg/5 mL syrup      amoxicillin-clavulanate (AUGMENTIN) 875-125 mg per tablet   3. Ex-smoker V20.166 V15.82      Follow-up Disposition:  Return if symptoms worsen or fail to improve. reviewed medications and side effects in detail    Encouraged to increase hydration, call provider if no improvement     Patient voices understanding and acceptance of this advice and will call back if any further questions or concerns. An After Visit Summary was printed and given to the patient.

## 2018-11-27 NOTE — PROGRESS NOTES
Rm#[id number]  Pt has requested not to do strep swab     Chief Complaint   Patient presents with    Sore Throat     sore throat, nasal drainage, cough, vomitted x1. x4 days      1. Have you been to the ER, urgent care clinic since your last visit? Hospitalized since your last visit? No    2. Have you seen or consulted any other health care providers outside of the [address] [place] [address] since your last visit? Include any pap smears or colon screening.  No  Health Maintenance Due   Topic Date Due    DTaP/Tdap/Td series (1 - Tdap) 04/28/2016     PHQ over the last two weeks 11/27/2018   PHQ Not Done -   Little interest or pleasure in doing things Not at all   Feeling down, depressed, irritable, or hopeless Not at all   Total Score PHQ 2 0

## 2018-11-27 NOTE — PATIENT INSTRUCTIONS
Bronchitis: Care Instructions  Your Care Instructions    Bronchitis is inflammation of the bronchial tubes, which carry air to the lungs. The tubes swell and produce mucus, or phlegm. The mucus and inflamed bronchial tubes make you cough. You may have trouble breathing. Most cases of bronchitis are caused by viruses like those that cause colds. Antibiotics usually do not help and they may be harmful. Bronchitis usually develops rapidly and lasts about 2 to 3 weeks in otherwise healthy people. Follow-up care is a key part of your treatment and safety. Be sure to make and go to all appointments, and call your doctor if you are having problems. It's also a good idea to know your test results and keep a list of the medicines you take. How can you care for yourself at home? · Take all medicines exactly as prescribed. Call your doctor if you think you are having a problem with your medicine. · Get some extra rest.  · Take an over-the-counter pain medicine, such as acetaminophen (Tylenol), ibuprofen (Advil, Motrin), or naproxen (Aleve) to reduce fever and relieve body aches. Read and follow all instructions on the label. · Do not take two or more pain medicines at the same time unless the doctor told you to. Many pain medicines have acetaminophen, which is Tylenol. Too much acetaminophen (Tylenol) can be harmful. · Take an over-the-counter cough medicine that contains dextromethorphan to help quiet a dry, hacking cough so that you can sleep. Avoid cough medicines that have more than one active ingredient. Read and follow all instructions on the label. · Breathe moist air from a humidifier, hot shower, or sink filled with hot water. The heat and moisture will thin mucus so you can cough it out. · Do not smoke. Smoking can make bronchitis worse. If you need help quitting, talk to your doctor about stop-smoking programs and medicines. These can increase your chances of quitting for good.   When should you call for help? Call 911 anytime you think you may need emergency care. For example, call if:    · You have severe trouble breathing.    Call your doctor now or seek immediate medical care if:    · You have new or worse trouble breathing.     · You cough up dark brown or bloody mucus (sputum).     · You have a new or higher fever.     · You have a new rash.    Watch closely for changes in your health, and be sure to contact your doctor if:    · You cough more deeply or more often, especially if you notice more mucus or a change in the color of your mucus.     · You are not getting better as expected. Where can you learn more? Go to http://genna-chester.info/. Enter H333 in the search box to learn more about \"Bronchitis: Care Instructions. \"  Current as of: December 6, 2017  Content Version: 11.8  © 1506-5806 [employer]. Care instructions adapted under license by [employer] (which disclaims liability or warranty for this information). If you have questions about a medical condition or this instruction, always ask your healthcare professional. [place] [address] any warranty or liability for your use of this information. Learning About the Safe Use of Antibiotics  Introduction    Antibiotics are drugs used to kill bacteria. Bacteria can cause infections. These include strep throat, ear infections, and pneumonia. These medicines can't cure everything. They don't kill viruses or help with allergies. They don't help illnesses such as the common cold, the flu, or a runny nose. And they can cause side effects. There are many types of antibiotics. Your doctor will decide which one will work best for your infection. Examples include:  · Amoxicillin. · Cephalexin (Keflex). · Ciprofloxacin (Cipro). What are the possible side effects? Side effects can include:  · Nausea. · Diarrhea. · Skin rash. · Yeast infection. · A severe allergic reaction.  It may cause itching, swelling, and breathing problems. This is rare. You may have other side effects or reactions not listed here. Check the information that comes with your medicine. Should you take antibiotics just in case? Don't take antibiotics when you don't need them. If you do that, they may not work when you do need them. Each time you take antibiotics, you are more likely to have some bacteria that survive and aren't killed by the medicine. Bacteria that don't die can change and become even harder to kill. These are called antibiotic-resistant bacteria. They can cause longer and more serious infections. To treat them, you may need different, stronger antibiotics that have more side effects and may cost more. So always ask your doctor if antibiotics are the best treatment. Explain that you do not want antibiotics unless you need them. Help protect the community  Using antibiotics when they're not needed leads to the development of antibiotic-resistant bacteria. These tougher bacteria can spread to family members, children, and coworkers. People in your community will have a risk of getting an infection that is harder to cure and that costs more to treat. How can you take antibiotics safely? Be safe with medicine. Take your antibiotics as directed. Do not stop taking them just because you feel better. You need to take the full course of medicine. This will help make sure your infection is cured. It will also help prevent the growth of antibiotic-resistant bacteria. Always take the exact amount that the label says to take. If the label says to take the medicine at a certain time, follow those directions. You might feel better after you take an antibiotic for a few days. But it is important to keep taking it for as long as prescribed. That will help you get rid of those bacteria that are a bit stronger and that survive the first few days of treatment. Where can you learn more?   Go to http://genna-chester.info/. Enter F695 in the search box to learn more about \"Learning About the Safe Use of Antibiotics. \"  Current as of: November 18, 2017  Content Version: 11.8  © 6282-8974 [employer]. Care instructions adapted under license by [employer] (which disclaims liability or warranty for this information). If you have questions about a medical condition or this instruction, always ask your healthcare professional. [address] any warranty or liability for your use of this information. Upper Respiratory Infection (Cold): Care Instructions  Your Care Instructions    An upper respiratory infection, or URI, is an infection of the nose, sinuses, or throat. URIs are spread by coughs, sneezes, and direct contact. The common cold is the most frequent kind of URI. The flu and sinus infections are other kinds of URIs. Almost all URIs are caused by viruses. Antibiotics won't cure them. But you can treat most infections with home care. This may include drinking lots of fluids and taking over-the-counter pain medicine. You will probably feel better in 4 to 10 days. The doctor has checked you carefully, but problems can develop later. If you notice any problems or new symptoms, get medical treatment right away. Follow-up care is a key part of your treatment and safety. Be sure to make and go to all appointments, and call your doctor if you are having problems. It's also a good idea to know your test results and keep a list of the medicines you take. How can you care for yourself at home? · To prevent dehydration, drink plenty of fluids, enough so that your urine is light yellow or clear like water. Choose water and other caffeine-free clear liquids until you feel better. If you have kidney, heart, or liver disease and have to limit fluids, talk with your doctor before you increase the amount of fluids you drink.   · Take an over-the-counter pain medicine, such as acetaminophen (Tylenol), ibuprofen (Advil, Motrin), or naproxen (Aleve). Read and follow all instructions on the label. · Before you use cough and cold medicines, check the label. These medicines may not be safe for young children or for people with certain health problems. · Be careful when taking over-the-counter cold or flu medicines and Tylenol at the same time. Many of these medicines have acetaminophen, which is Tylenol. Read the labels to make sure that you are not taking more than the recommended dose. Too much acetaminophen (Tylenol) can be harmful. · Get plenty of rest.  · Do not smoke or allow others to smoke around you. If you need help quitting, talk to your doctor about stop-smoking programs and medicines. These can increase your chances of quitting for good. When should you call for help? Call 911 anytime you think you may need emergency care. For example, call if:    · You have severe trouble breathing.    Call your doctor now or seek immediate medical care if:    · You seem to be getting much sicker.     · You have new or worse trouble breathing.     · You have a new or higher fever.     · You have a new rash.    Watch closely for changes in your health, and be sure to contact your doctor if:    · You have a new symptom, such as a sore throat, an earache, or sinus pain.     · You cough more deeply or more often, especially if you notice more mucus or a change in the color of your mucus.     · You do not get better as expected. Where can you learn more? Go to http://genna-chester.info/. Enter E057 in the search box to learn more about \"Upper Respiratory Infection (Cold): Care Instructions. \"  Current as of: December 6, 2017  Content Version: 11.8  © 7819-3255 [employer], [employer]. Care instructions adapted under license by [employer] (which disclaims liability or warranty for this information).  If you have questions about a medical condition or this instruction, always ask your healthcare professional. [place] [address] any warranty or liability for your use of this information.

## 2018-12-10 RX ORDER — POLYETHYLENE GLYCOL 3350 17 G/17G
POWDER, FOR SOLUTION ORAL
Qty: 510 G | Refills: 2 | Status: SHIPPED | OUTPATIENT
Start: 2018-12-10 | End: 2019-08-06 | Stop reason: SDUPTHER

## 2019-01-18 ENCOUNTER — OFFICE VISIT (OUTPATIENT)
Dept: INTERNAL MEDICINE CLINIC | Age: 24
End: 2019-01-18

## 2019-01-18 VITALS
BODY MASS INDEX: 28.73 KG/M2 | DIASTOLIC BLOOD PRESSURE: 89 MMHG | WEIGHT: 194 LBS | HEIGHT: 69 IN | OXYGEN SATURATION: 97 % | HEART RATE: 105 BPM | TEMPERATURE: 97.7 F | SYSTOLIC BLOOD PRESSURE: 135 MMHG | RESPIRATION RATE: 16 BRPM

## 2019-01-18 DIAGNOSIS — R42 DIZZINESS: Primary | ICD-10-CM

## 2019-01-18 DIAGNOSIS — R11.0 NAUSEA WITHOUT VOMITING: ICD-10-CM

## 2019-01-18 NOTE — PROGRESS NOTES
HISTORY OF PRESENT ILLNESS  Yuridia Olson is a 21 y.o. male group home patient presents for acute care  HPI  He reports dizziness lasting ~ 5-10 minutes at day support program Monday  Denies recent illness. Believes symptoms r/t missed meal     He has intermittent nausea without vomiting; no triggers or relationship to meals or medication    No new medication or dietary change    Compliant with medical management and follow up with mental health provider    Past Medical History:   Diagnosis Date    Asthma     Seizures ([facility] [address]     last seizure 2001           Current Outpatient Medications on File Prior to Visit   Medication Sig Dispense Refill    polyethylene glycol (MIRALAX) 17 gram/dose powder MIX 1 CAPFUL (SEE 17 gm LINE) IN A GLASS OF WATER & DRINK ONCE DAILY FOR CONSTIPATION. 510 g 2    Brompheniramine-Pseudoeph-DM (BROMFED DM) 2-30-10 mg/5 mL syrup Take 5 mL by mouth four (4) times daily as needed. 118 mL o    OLANZapine (ZYPREXA) 5 mg tablet Take 2.5 mg by mouth nightly.  LORazepam (ATIVAN) 2 mg tablet Take  by mouth every six (6) hours as needed for Anxiety.  chlorproMAZINE (THORAZINE) 100 mg tablet Take 100 mg by mouth three (3) times daily as needed.  acetaminophen (TYLENOL) 325 mg tablet Take  by mouth every four (4) hours as needed for Pain.  atomoxetine (STRATTERA) 100 mg capsule Take 100 mg by mouth daily.  docusate sodium (COLACE) 100 mg capsule Take 100 mg by mouth two (2) times a day.  QUEtiapine (SEROQUEL) 200 mg tablet Take 300 mg by mouth three (3) times daily.  divalproex DR (DEPAKOTE) 500 mg tablet Take  by mouth three (3) times daily. No current facility-administered medications on file prior to visit. Review of Systems   Constitutional: Negative for chills, diaphoresis and fever. HENT: Negative. Eyes: Negative. Respiratory: Negative. Cardiovascular: Negative for chest pain and palpitations. Gastrointestinal: Positive for nausea. Negative for abdominal pain, constipation, diarrhea and vomiting. Genitourinary: Negative. Neurological: Positive for dizziness (resolved). Negative for loss of consciousness and headaches. Visit Vitals  /89 (BP 1 Location: Right arm, BP Patient Position: Sitting)   Pulse (!) 105   Temp 97.7 °F (36.5 °C) (Oral)   Resp 16   Ht 5' 9\" (1.753 m)   Wt 194 lb (88 kg)   SpO2 97%   BMI 28.65 kg/m²     Physical Exam   Constitutional: He appears well-developed and well-nourished. No distress. HENT:   Head: Normocephalic and atraumatic. Right Ear: External ear normal.   Left Ear: External ear normal.   Nose: Nose normal.   Mouth/Throat: Oropharynx is clear and moist. No oropharyngeal exudate. Eyes: Conjunctivae and EOM are normal. Pupils are equal, round, and reactive to light. Cardiovascular: Normal rate, regular rhythm and normal heart sounds. Pulmonary/Chest: Effort normal and breath sounds normal.   Abdominal: Soft. Bowel sounds are normal. He exhibits no distension and no mass. There is no tenderness. There is no rebound and no guarding. Lymphadenopathy:     He has no cervical adenopathy. Neurological: He is alert. Normal gait   Skin: Skin is warm and dry. He is not diaphoretic. Face flushed   Psychiatric: His affect is inappropriate. His speech is tangential. Cognition and memory are normal. He is inattentive. ASSESSMENT and PLAN    ICD-10-CM ICD-9-CM    1. Dizziness Z14 214.4 METABOLIC PANEL, COMPREHENSIVE      CBC WITH AUTOMATED DIFF      TSH RFX ON ABNORMAL TO FREE T4      AMB POC URINALYSIS DIP STICK AUTO W/O MICRO      HEMOGLOBIN A1C WITH EAG   2. Nausea without vomiting P64.7 382.92 METABOLIC PANEL, COMPREHENSIVE      CBC WITH AUTOMATED DIFF      TSH RFX ON ABNORMAL TO FREE T4      AMB POC URINALYSIS DIP STICK AUTO W/O MICRO      HEMOGLOBIN A1C WITH EAG     Follow-up Disposition:  Return in about 4 weeks (around 2/15/2019) for dizziness.   current treatment plan is effective, no change in therapy  lab results and schedule of future lab studies reviewed with patient  reviewed diet, exercise and weight control  reviewed medications and side effects in detail    Transient dizziness. Encourage to increase water intake to at least 48 ounces daily, regular meals      Patient voices understanding and acceptance of this advice and will call back if any further questions or concerns. An After Visit Summary was printed and given to the patient.

## 2019-01-18 NOTE — PROGRESS NOTES
Exam room 7    Jose West is a 21 y.o. male    Chief Complaint   Patient presents with    Dizziness     Started Monday after school and didn't feel like it anymore     1. Have you been to the ER, urgent care clinic since your last visit? Hospitalized since your last visit? No    2. Have you seen or consulted any other health care providers outside of the [address] [place] [address] since your last visit? Include any pap smears or colon screening.  No     Health Maintenance Due   Topic Date Due    DTaP/Tdap/Td series (1 - Tdap) 04/28/2016    MEDICARE YEARLY EXAM  01/17/2019

## 2019-01-19 LAB
ALBUMIN SERPL-MCNC: 4.7 G/DL (ref 3.5–5.5)
ALBUMIN/GLOB SERPL: 1.5 {RATIO} (ref 1.2–2.2)
ALP SERPL-CCNC: 62 IU/L (ref 39–117)
ALT SERPL-CCNC: 57 IU/L (ref 0–44)
AST SERPL-CCNC: 35 IU/L (ref 0–40)
BASOPHILS # BLD AUTO: 0 X10E3/UL (ref 0–0.2)
BASOPHILS NFR BLD AUTO: 0 %
BILIRUB SERPL-MCNC: 0.5 MG/DL (ref 0–1.2)
BUN SERPL-MCNC: 16 MG/DL (ref 6–20)
BUN/CREAT SERPL: 18 (ref 9–20)
CALCIUM SERPL-MCNC: 9.8 MG/DL (ref 8.7–10.2)
CHLORIDE SERPL-SCNC: 100 MMOL/L (ref 96–106)
CO2 SERPL-SCNC: 24 MMOL/L (ref 20–29)
CREAT SERPL-MCNC: 0.87 MG/DL (ref 0.76–1.27)
EOSINOPHIL # BLD AUTO: 0.1 X10E3/UL (ref 0–0.4)
EOSINOPHIL NFR BLD AUTO: 2 %
ERYTHROCYTE [DISTWIDTH] IN BLOOD BY AUTOMATED COUNT: 13.5 % (ref 12.3–15.4)
EST. AVERAGE GLUCOSE BLD GHB EST-MCNC: 97 MG/DL
GLOBULIN SER CALC-MCNC: 3.1 G/DL (ref 1.5–4.5)
GLUCOSE SERPL-MCNC: 75 MG/DL (ref 65–99)
HBA1C MFR BLD: 5 % (ref 4.8–5.6)
HCT VFR BLD AUTO: 45.7 % (ref 37.5–51)
HGB BLD-MCNC: 15.4 G/DL (ref 13–17.7)
IMM GRANULOCYTES # BLD AUTO: 0 X10E3/UL (ref 0–0.1)
IMM GRANULOCYTES NFR BLD AUTO: 1 %
LYMPHOCYTES # BLD AUTO: 2.1 X10E3/UL (ref 0.7–3.1)
LYMPHOCYTES NFR BLD AUTO: 55 %
MCH RBC QN AUTO: 31 PG (ref 26.6–33)
MCHC RBC AUTO-ENTMCNC: 33.7 G/DL (ref 31.5–35.7)
MCV RBC AUTO: 92 FL (ref 79–97)
MONOCYTES # BLD AUTO: 0.7 X10E3/UL (ref 0.1–0.9)
MONOCYTES NFR BLD AUTO: 18 %
MORPHOLOGY BLD-IMP: ABNORMAL
NEUTROPHILS # BLD AUTO: 0.9 X10E3/UL (ref 1.4–7)
NEUTROPHILS NFR BLD AUTO: 24 %
PLATELET # BLD AUTO: 141 X10E3/UL (ref 150–379)
POTASSIUM SERPL-SCNC: 3.8 MMOL/L (ref 3.5–5.2)
PROT SERPL-MCNC: 7.8 G/DL (ref 6–8.5)
RBC # BLD AUTO: 4.97 X10E6/UL (ref 4.14–5.8)
SODIUM SERPL-SCNC: 140 MMOL/L (ref 134–144)
TSH SERPL DL<=0.005 MIU/L-ACNC: 3.04 UIU/ML (ref 0.45–4.5)
WBC # BLD AUTO: 3.8 X10E3/UL (ref 3.4–10.8)

## 2019-01-20 ENCOUNTER — TELEPHONE (OUTPATIENT)
Dept: INTERNAL MEDICINE CLINIC | Age: 24
End: 2019-01-20

## 2019-01-20 DIAGNOSIS — R74.8 ELEVATED LIVER ENZYMES: Primary | ICD-10-CM

## 2019-01-22 LAB
HAV IGM SERPL QL IA: NEGATIVE
HBV CORE IGM SERPL QL IA: NEGATIVE
HBV SURFACE AG SERPL QL IA: NEGATIVE
HCV AB S/CO SERPL IA: <0.1 S/CO RATIO (ref 0–0.9)
SPECIMEN STATUS REPORT, ROLRST: NORMAL

## 2019-02-19 ENCOUNTER — OFFICE VISIT (OUTPATIENT)
Dept: INTERNAL MEDICINE CLINIC | Age: 24
End: 2019-02-19

## 2019-02-19 VITALS
WEIGHT: 194.2 LBS | TEMPERATURE: 97.9 F | HEART RATE: 110 BPM | OXYGEN SATURATION: 98 % | RESPIRATION RATE: 18 BRPM | BODY MASS INDEX: 28.76 KG/M2 | DIASTOLIC BLOOD PRESSURE: 88 MMHG | SYSTOLIC BLOOD PRESSURE: 136 MMHG | HEIGHT: 69 IN

## 2019-02-19 DIAGNOSIS — H65.01 RIGHT ACUTE SEROUS OTITIS MEDIA, RECURRENCE NOT SPECIFIED: Primary | ICD-10-CM

## 2019-02-19 DIAGNOSIS — F20.9 SCHIZOPHRENIA, UNSPECIFIED TYPE (HCC): ICD-10-CM

## 2019-02-19 DIAGNOSIS — R42 DIZZINESS: ICD-10-CM

## 2019-02-19 RX ORDER — CEPHALEXIN 500 MG/1
500 CAPSULE ORAL 3 TIMES DAILY
Qty: 30 CAP | Refills: 0 | Status: SHIPPED | OUTPATIENT
Start: 2019-02-19 | End: 2019-03-01

## 2019-02-19 NOTE — PATIENT INSTRUCTIONS
Patient reports elevated  blood pressure. Please bring blood pressure readings on follow up visit     Right ear serous otitis media. Start antibiotic as prescribed. See patient education information     Follow up 2 week for reevaluation          Dizziness: Care Instructions  Your Care Instructions  Dizziness is the feeling of unsteadiness or fuzziness in your head. It is different than having vertigo, which is a feeling that the room is spinning or that you are moving or falling. It is also different from lightheadedness, which is the feeling that you are about to faint. It can be hard to know what causes dizziness. Some people feel dizzy when they have migraine headaches. Sometimes bouts of flu can make you feel dizzy. Some medical conditions, such as heart problems or high blood pressure, can make you feel dizzy. Many medicines can cause dizziness, including medicines for high blood pressure, pain, or anxiety. If a medicine causes your symptoms, your doctor may recommend that you stop or change the medicine. If it is a problem with your heart, you may need medicine to help your heart work better. If there is no clear reason for your symptoms, your doctor may suggest watching and waiting for a while to see if the dizziness goes away on its own. Follow-up care is a key part of your treatment and safety. Be sure to make and go to all appointments, and call your doctor if you are having problems. It's also a good idea to know your test results and keep a list of the medicines you take. How can you care for yourself at home? · If your doctor recommends or prescribes medicine, take it exactly as directed. Call your doctor if you think you are having a problem with your medicine. · Do not drive while you feel dizzy. · Try to prevent falls. Steps you can take include:  ? Using nonskid mats, adding grab bars near the tub, and using night-lights.   ? Clearing your home so that walkways are free of anything you might trip on.  ? Letting family and friends know that you have been feeling dizzy. This will help them know how to help you. When should you call for help? Call 911 anytime you think you may need emergency care. For example, call if:    · You passed out (lost consciousness).     · You have dizziness along with symptoms of a heart attack. These may include:  ? Chest pain or pressure, or a strange feeling in the chest.  ? Sweating. ? Shortness of breath. ? Nausea or vomiting. ? Pain, pressure, or a strange feeling in the back, neck, jaw, or upper belly or in one or both shoulders or arms. ? Lightheadedness or sudden weakness. ? A fast or irregular heartbeat.     · You have symptoms of a stroke. These may include:  ? Sudden numbness, tingling, weakness, or loss of movement in your face, arm, or leg, especially on only one side of your body. ? Sudden vision changes. ? Sudden trouble speaking. ? Sudden confusion or trouble understanding simple statements. ? Sudden problems with walking or balance. ? A sudden, severe headache that is different from past headaches.    Call your doctor now or seek immediate medical care if:    · You feel dizzy and have a fever, headache, or ringing in your ears.     · You have new or increased nausea and vomiting.     · Your dizziness does not go away or comes back.    Watch closely for changes in your health, and be sure to contact your doctor if:    · You do not get better as expected. Where can you learn more? Go to http://genna-chester.info/. Enter I453 in the search box to learn more about \"Dizziness: Care Instructions. \"  Current as of: September 23, 2018  Content Version: 11.9  © 8154-4514 [employer]dgt. Care instructions adapted under license by [employer] (which disclaims liability or warranty for this information).  If you have questions about a medical condition or this instruction, always ask your healthcare professional. Healthwise, [employer] disclaims any warranty or liability for your use of this information. Middle Ear Fluid: Care Instructions  Your Care Instructions    Fluid often builds up inside the ear during a cold or allergies. Usually the fluid drains away, but sometimes a small tube in the ear, called the eustachian tube, stays blocked for months. Symptoms of fluid buildup may include:  · Popping, ringing, or a feeling of fullness or pressure in the ear. · Trouble hearing. · Balance problems and dizziness. In most cases, you can treat yourself at home. Follow-up care is a key part of your treatment and safety. Be sure to make and go to all appointments, and call your doctor if you are having problems. It's also a good idea to know your test results and keep a list of the medicines you take. How can you care for yourself at home? · In most cases, the fluid clears up within a few months without treatment. You may need more tests if the fluid does not clear up after 3 months. · If your doctor prescribed antibiotics, take them as directed. Do not stop taking them just because you feel better. You need to take the full course of antibiotics. When should you call for help? Call your doctor now or seek immediate medical care if:    · You have symptoms of infection, such as:  ? Increased pain, swelling, warmth, or redness. ? Pus draining from the area. ? A fever.    Watch closely for changes in your health, and be sure to contact your doctor if:    · You notice changes in hearing.     · You do not get better as expected. Where can you learn more? Go to http://genna-chester.info/. Enter K928 in the search box to learn more about \"Middle Ear Fluid: Care Instructions. \"  Current as of: March 27, 2018  Content Version: 11.9  © 5397-2964 [employer]. Care instructions adapted under license by [employer] (which disclaims liability or warranty for this information).  If you have questions about a medical condition or this instruction, always ask your healthcare professional. [place] [address] any warranty or liability for your use of this information.

## 2019-02-19 NOTE — PROGRESS NOTES
HISTORY OF PRESENT ILLNESS  Jose West is a 21 y.o. male group home patient presents for follow up visit  HPI   Dizziness improved, now only when blood pressure is elevated and occur about once weekly for ~ 5 minutes. Systolic blood pressure was once 180. Get only 4 hours sleep for a few days now, up studying  from a book, not in school    Drinks more water since last visit     No change in medical management and follow up with mental health provider    Past Medical History:   Diagnosis Date    Asthma     Seizures ([facility] [address]     last seizure 2001     Current Outpatient Medications on File Prior to Visit   Medication Sig Dispense Refill    polyethylene glycol (MIRALAX) 17 gram/dose powder MIX 1 CAPFUL (SEE 17 gm LINE) IN A GLASS OF WATER & DRINK ONCE DAILY FOR CONSTIPATION. 510 g 2    Brompheniramine-Pseudoeph-DM (BROMFED DM) 2-30-10 mg/5 mL syrup Take 5 mL by mouth four (4) times daily as needed. 118 mL o    OLANZapine (ZYPREXA) 5 mg tablet Take 2.5 mg by mouth nightly.  LORazepam (ATIVAN) 2 mg tablet Take  by mouth every six (6) hours as needed for Anxiety.  chlorproMAZINE (THORAZINE) 100 mg tablet Take 100 mg by mouth three (3) times daily as needed.  acetaminophen (TYLENOL) 325 mg tablet Take  by mouth every four (4) hours as needed for Pain.  atomoxetine (STRATTERA) 100 mg capsule Take 100 mg by mouth daily.  docusate sodium (COLACE) 100 mg capsule Take 100 mg by mouth two (2) times a day.  QUEtiapine (SEROQUEL) 200 mg tablet Take 300 mg by mouth three (3) times daily.  divalproex DR (DEPAKOTE) 500 mg tablet Take  by mouth three (3) times daily. No current facility-administered medications on file prior to visit. Review of Systems   Constitutional: Negative for chills and fever. Eyes: Negative. Respiratory: Negative. Cardiovascular: Negative. Gastrointestinal: Negative. Genitourinary: Negative. Musculoskeletal: Negative. Neurological: Positive for dizziness. Negative for headaches. Physical Exam   Constitutional: He appears well-developed and well-nourished. No distress. HENT:   Head: Normocephalic and atraumatic. Right Ear: External ear normal.   Left Ear: External ear normal.   Nose: Nose normal.   Mouth/Throat: Oropharynx is clear and moist. No oropharyngeal exudate. Right TM bulging, fluid line, white spot on anterior area   Cardiovascular: Normal rate, regular rhythm and normal heart sounds. Pulmonary/Chest: Effort normal and breath sounds normal.   Musculoskeletal: He exhibits no edema, tenderness or deformity. Neurological: He is alert. No cranial nerve deficit. Skin: He is not diaphoretic. Psychiatric: His behavior is normal. His mood appears anxious. His speech is tangential.   Poor historian, intellectual disability       ASSESSMENT and PLAN    ICD-10-CM ICD-9-CM    1. Right acute serous otitis media, recurrence not specified H65.01 381.01 cephALEXin (KEFLEX) 500 mg capsule   2. Dizziness R42 780.4    3. Schizophrenia, unspecified type ([facility] F20.9 295.90      Follow-up Disposition:  Return in about 2 weeks (around 3/5/2019) for ear, dizziness , blood pressure. Patient unaccompanied by staff. Unreliable historian. No documentation of reported elevated blood pressure available. Advised to bring blood pressure log on follow up visit. Written instructions given on same. Consider ENT referral if dizziness persists    Patient voices understanding and acceptance of this advice and will call back if any further questions or concerns. An After Visit Summary was printed and given to the patient.

## 2019-02-19 NOTE — PROGRESS NOTES
Exam room [address] is a 21 y.o. male   Pt was seated in waiting room and couldn't remember what his appointment was for, I reminded him it was for dizziness and he preceded to state that his BP has been high and measures it using a portable meter available at his group home. Pt states he lost his brother two days ago to alcoholism. Pt states he feels dizzy when his BP spikes, when I asked what is the BP number for the \"spike\" he states 106/70. Chief Complaint   Patient presents with    Dizziness     Visit Vitals  /88 (BP 1 Location: Left arm, BP Patient Position: Sitting)   Pulse (!) 110   Temp 97.9 °F (36.6 °C) (Oral)   Resp 18   Ht 5' 9\" (1.753 m)   Wt 194 lb 3.2 oz (88.1 kg)   SpO2 98%   BMI 28.68 kg/m²     1. Have you been to the ER, urgent care clinic since your last visit? Hospitalized since your last visit? No    2. Have you seen or consulted any other health care providers outside of the [address] [place] [address] since your last visit? Include any pap smears or colon screening.  No  Health Maintenance Due   Topic Date Due    Pneumococcal 19-64 Medium Risk (1 of 1 - PPSV23) 04/28/2014    DTaP/Tdap/Td series (1 - Tdap) 04/28/2016     Learning Assessment 1/11/2019   PRIMARY LEARNER Patient   HIGHEST LEVEL OF EDUCATION - PRIMARY LEARNER  -   BARRIERS PRIMARY LEARNER NONE   CO-LEARNER CAREGIVER -   PRIMARY LANGUAGE ENGLISH   LEARNER PREFERENCE PRIMARY -   ANSWERED BY patient   RELATIONSHIP SELF     3 most recent PHQ Screens 2/19/2019   PHQ Not Done -   Little interest or pleasure in doing things More than half the days   Feeling down, depressed, irritable, or hopeless Several days   Total Score PHQ 2 3   Trouble falling or staying asleep, or sleeping too much Several days   Feeling tired or having little energy Several days   Poor appetite, weight loss, or overeating Several days   Feeling bad about yourself - or that you are a failure or have let yourself or your family down Several days Trouble concentrating on things such as school, work, reading, or watching TV Several days   Moving or speaking so slowly that other people could have noticed; or the opposite being so fidgety that others notice More than half the days   Thoughts of being better off dead, or hurting yourself in some way Not at all   PHQ 9 Score 10

## 2019-02-25 ENCOUNTER — TELEPHONE (OUTPATIENT)
Dept: INTERNAL MEDICINE CLINIC | Age: 24
End: 2019-02-25

## 2019-03-19 ENCOUNTER — OFFICE VISIT (OUTPATIENT)
Dept: INTERNAL MEDICINE CLINIC | Age: 24
End: 2019-03-19

## 2019-03-19 VITALS
HEIGHT: 69 IN | WEIGHT: 194 LBS | BODY MASS INDEX: 28.73 KG/M2 | DIASTOLIC BLOOD PRESSURE: 84 MMHG | RESPIRATION RATE: 19 BRPM | SYSTOLIC BLOOD PRESSURE: 126 MMHG | TEMPERATURE: 98 F | OXYGEN SATURATION: 97 % | HEART RATE: 101 BPM

## 2019-03-19 DIAGNOSIS — F20.9 SCHIZOPHRENIA, UNSPECIFIED TYPE (HCC): ICD-10-CM

## 2019-03-19 DIAGNOSIS — R53.83 FATIGUE, UNSPECIFIED TYPE: ICD-10-CM

## 2019-03-19 DIAGNOSIS — H93.13 TINNITUS OF BOTH EARS: Primary | ICD-10-CM

## 2019-03-19 RX ORDER — QUETIAPINE FUMARATE 300 MG/1
TABLET, FILM COATED ORAL
COMMUNITY
Start: 2019-02-15 | End: 2019-09-19 | Stop reason: DRUGHIGH

## 2019-03-19 RX ORDER — OLANZAPINE 2.5 MG/1
TABLET ORAL
COMMUNITY
Start: 2019-03-15 | End: 2019-07-03

## 2019-03-19 NOTE — PROGRESS NOTES
Exam room 7  Jose West is a 21 y.o. male   Pt is here to F/U on previous ear infection. States he feels better  Has periodic ringing  Chief Complaint   Patient presents with    Ear Pain     f/u     Visit Vitals  /84 (BP 1 Location: Left arm, BP Patient Position: Sitting)   Pulse (!) 101   Temp 98 °F (36.7 °C) (Oral)   Resp 19   Ht 5' 9\" (1.753 m)   Wt 194 lb (88 kg)   SpO2 97%   BMI 28.65 kg/m²     1. Have you been to the ER, urgent care clinic since your last visit? Hospitalized since your last visit? No    2. Have you seen or consulted any other health care providers outside of the [address] [place] [address] since your last visit? Include any pap smears or colon screening.  No  Health Maintenance Due   Topic Date Due    Pneumococcal 19-64 Medium Risk (1 of 1 - PPSV23) 04/28/2014    DTaP/Tdap/Td series (1 - Tdap) 04/28/2016     Learning Assessment 1/11/2019   PRIMARY LEARNER Patient   HIGHEST LEVEL OF EDUCATION - PRIMARY LEARNER  -   BARRIERS PRIMARY LEARNER NONE   CO-LEARNER CAREGIVER -   PRIMARY LANGUAGE ENGLISH   LEARNER PREFERENCE PRIMARY -   ANSWERED BY patient   RELATIONSHIP SELF

## 2019-03-19 NOTE — PATIENT INSTRUCTIONS
Tinnitus: Care Instructions  Your Care Instructions    Many people have some ringing sounds in their ears once in a while. You may hear a roar, a hiss, a tinkle, or a buzz. The sound usually lasts only a few minutes. If it goes on all the time, you may have tinnitus. Tinnitus is usually caused by long-term exposure to loud noise. This damages the nerves in the inner ear. It can occur with all types of hearing loss. It may be a symptom of almost any ear problem. Tinnitus may be caused by a buildup of earwax. Or it may be caused by ear infections or certain medicines (especially antibiotics or large amounts of aspirin). You can also hear noises in your ears because of an injury to the ears, drinking too much alcohol or caffeine, or a medical condition. You may need tests to evaluate your hearing and to find causes of long-lasting tinnitus. Your doctor may suggest one or more treatments to help you cope with it. You can also do things at home to help reduce symptoms. Follow-up care is a key part of your treatment and safety. Be sure to make and go to all appointments, and call your doctor if you are having problems. It's also a good idea to know your test results and keep a list of the medicines you take. How can you care for yourself at home? · Limit or cut out alcohol and caffeine. They can make your symptoms worse. · Do not smoke or use other tobacco products. Nicotine reduces blood flow to the ear and makes tinnitus worse. If you need help quitting, talk to your doctor about stop-smoking programs and medicines. These can increase your chances of quitting for good. · Talk to your doctor about whether to stop taking aspirin and similar products such as ibuprofen or naproxen. · Get exercise often. It can improve blood flow to the ear. Ways to cope with noise  Some tinnitus may last a long time. To cope with noise, try to:  · Avoid noises that you think caused your tinnitus.  If you can't avoid loud noises, wear earplugs or earmuffs. · Ignore the sound by paying attention to other things. · Relax using biofeedback, meditation, or yoga. Feeling stressed and being tired can make tinnitus worse. · Play music or white noise to help you sleep. Background noise may cover up the noise that you hear in your ears. You can buy a machine that makes soothing sounds, such as ocean waves. When should you call for help? Call 911 anytime you think you may need emergency care. For example, call if:    · You have symptoms of a stroke. These may include:  ? Sudden numbness, tingling, weakness, or loss of movement in your face, arm, or leg, especially on only one side of your body. ? Sudden vision changes. ? Sudden trouble speaking. ? Sudden confusion or trouble understanding simple statements. ? Sudden problems with walking or balance. ? A sudden, severe headache that is different from past headaches.    Call your doctor now or seek immediate medical care if:    · You develop other symptoms. These may include hearing loss (or worse hearing loss), balance problems, dizziness, nausea, or vomiting.    Watch closely for changes in your health, and be sure to contact your doctor if:    · Your tinnitus moves from both ears to one ear.     · Your hearing loss gets worse within 1 day after an ear injury.     · Your tinnitus or hearing loss does not get better within 1 week after an ear injury.     · Your tinnitus bothers you enough that you want to take medicines to help you cope with it. Where can you learn more? Go to http://genna-chester.info/. Enter S165 in the search box to learn more about \"Tinnitus: Care Instructions. \"  Current as of: March 27, 2018  Content Version: 11.9  © 9322-2031 [employer]. Care instructions adapted under license by [employer] (which disclaims liability or warranty for this information).  If you have questions about a medical condition or this instruction, always ask your healthcare professional. [place] [address] any warranty or liability for your use of this information.

## 2019-03-21 ENCOUNTER — TELEPHONE (OUTPATIENT)
Dept: INTERNAL MEDICINE CLINIC | Age: 24
End: 2019-03-21

## 2019-03-21 NOTE — TELEPHONE ENCOUNTER
Tried reaching out to pt with both phone numbers on file, but there are non working. Lvm on Alex Polo's number (on [employer], all access) to let him know pt's consultation form is ready for . Placed up front for .

## 2019-03-25 PROBLEM — F20.9 SCHIZOPHRENIA (HCC): Status: ACTIVE | Noted: 2019-03-25

## 2019-03-25 NOTE — PROGRESS NOTES
HISTORY OF PRESENT ILLNESS  Aiyana Ta is a 21 y.o. male group home patient presents for follow up visit    HPI  He continues to have  \"ringing in ears\"    Dizziness has resolved. Reports he is very tired today, because as usual he went to bed late last night     Denies interim change in medical management    Compliant with medical management     Past Medical History:   Diagnosis Date    Asthma     Seizures ([address]     last seizure 2001       Current Outpatient Medications on File Prior to Visit   Medication Sig Dispense Refill    polyethylene glycol (MIRALAX) 17 gram/dose powder MIX 1 CAPFUL (SEE 17 gm LINE) IN A GLASS OF WATER & DRINK ONCE DAILY FOR CONSTIPATION. 510 g 2    Brompheniramine-Pseudoeph-DM (BROMFED DM) 2-30-10 mg/5 mL syrup Take 5 mL by mouth four (4) times daily as needed. 118 mL o    OLANZapine (ZYPREXA) 5 mg tablet Take 2.5 mg by mouth nightly.  LORazepam (ATIVAN) 2 mg tablet Take  by mouth every six (6) hours as needed for Anxiety.  chlorproMAZINE (THORAZINE) 100 mg tablet Take 100 mg by mouth three (3) times daily as needed.  acetaminophen (TYLENOL) 325 mg tablet Take  by mouth every four (4) hours as needed for Pain.  atomoxetine (STRATTERA) 100 mg capsule Take 100 mg by mouth daily.  docusate sodium (COLACE) 100 mg capsule Take 100 mg by mouth two (2) times a day.  QUEtiapine (SEROQUEL) 200 mg tablet Take 300 mg by mouth three (3) times daily.  divalproex DR (DEPAKOTE) 500 mg tablet Take  by mouth three (3) times daily.  OLANZapine (ZYPREXA) 2.5 mg tablet       QUEtiapine (SEROQUEL) 300 mg tablet        No current facility-administered medications on file prior to visit. Review of Systems   Constitutional: Positive for malaise/fatigue. HENT: Positive for tinnitus. Negative for congestion and hearing loss. Eyes: Negative. Respiratory: Negative. Cardiovascular: Negative. Neurological: Negative for dizziness and headaches. Visit Vitals  /84 (BP 1 Location: Left arm, BP Patient Position: Sitting)   Pulse (!) 101   Temp 98 °F (36.7 °C) (Oral)   Resp 19   Ht 5' 9\" (1.753 m)   Wt 194 lb (88 kg)   SpO2 97%   BMI 28.65 kg/m²     Physical Exam   Constitutional: He appears well-developed and well-nourished. No distress. HENT:   Nose: Nose normal.   Mouth/Throat: Oropharynx is clear and moist. No oropharyngeal exudate. Right TM remains injected    Eyes: Conjunctivae are normal. Right eye exhibits no discharge. Left eye exhibits no discharge. No scleral icterus. Neck: Normal range of motion. Neck supple. No thyromegaly present. Cardiovascular: Tachycardia present. Pulmonary/Chest: Effort normal and breath sounds normal.   Musculoskeletal: He exhibits no edema, tenderness or deformity. Lymphadenopathy:     He has no cervical adenopathy. Neurological: He is alert. No cranial nerve deficit. Skin: Skin is warm and dry. No rash noted. He is not diaphoretic. No erythema. Psychiatric: His mood appears anxious. His speech is tangential.   Mild intellectual disability, pleasant, cooperative He is inattentive. ASSESSMENT and PLAN    ICD-10-CM ICD-9-CM    1. Tinnitus of both ears H93.13 388.30 REFERRAL TO ENT-OTOLARYNGOLOGY   2. Fatigue, unspecified type R53.83 780.79    3. Schizophrenia, unspecified type ([facility] F20.9 295.90      Follow-up and Dispositions    · Return if symptoms worsen or fail to improve.       lab results and schedule of future lab studies reviewed with patient    Discussed indications for ENT evaluation     Patient again encouraged to get 7-8 hours sleep, balance diet, at least 48 ounces of water daily    Encouraged follow up with mental health provider as scheduled, compliance with medical management    Patient voices understanding and acceptance of this advice and will call back if any further questions or concerns. An After Visit Summary was printed and given to the patient.

## 2019-03-25 NOTE — TELEPHONE ENCOUNTER
Called number on file, and after confirming  spoke to [facility]  Who asked for paperwork to be faxed verses having it picked up. Fax number Consultation Form was faxed to  [phone].  Confirmation received, placed into scan folder

## 2019-05-14 ENCOUNTER — OFFICE VISIT (OUTPATIENT)
Dept: URGENT CARE | Age: 24
End: 2019-05-14

## 2019-05-14 VITALS
DIASTOLIC BLOOD PRESSURE: 75 MMHG | WEIGHT: 188.9 LBS | OXYGEN SATURATION: 97 % | SYSTOLIC BLOOD PRESSURE: 121 MMHG | BODY MASS INDEX: 27.04 KG/M2 | TEMPERATURE: 99.1 F | HEART RATE: 114 BPM | RESPIRATION RATE: 18 BRPM | HEIGHT: 70 IN

## 2019-05-14 DIAGNOSIS — J06.9 VIRAL UPPER RESPIRATORY ILLNESS: Primary | ICD-10-CM

## 2019-05-14 DIAGNOSIS — L90.5 SCAR OF KNEE: ICD-10-CM

## 2019-05-14 DIAGNOSIS — M54.50 CHRONIC LEFT-SIDED LOW BACK PAIN WITHOUT SCIATICA: ICD-10-CM

## 2019-05-14 DIAGNOSIS — H66.002 ACUTE SUPPURATIVE OTITIS MEDIA OF LEFT EAR WITHOUT SPONTANEOUS RUPTURE OF TYMPANIC MEMBRANE, RECURRENCE NOT SPECIFIED: ICD-10-CM

## 2019-05-14 DIAGNOSIS — R05.9 COUGH: ICD-10-CM

## 2019-05-14 DIAGNOSIS — G89.29 CHRONIC LEFT-SIDED LOW BACK PAIN WITHOUT SCIATICA: ICD-10-CM

## 2019-05-14 RX ORDER — AMOXICILLIN AND CLAVULANATE POTASSIUM 875; 125 MG/1; MG/1
1 TABLET, FILM COATED ORAL 2 TIMES DAILY
Qty: 14 TAB | Refills: 0 | Status: SHIPPED | OUTPATIENT
Start: 2019-05-14 | End: 2019-05-21

## 2019-05-14 RX ORDER — ALBUTEROL SULFATE 90 UG/1
2 AEROSOL, METERED RESPIRATORY (INHALATION)
Qty: 1 INHALER | Refills: 0 | Status: SHIPPED | OUTPATIENT
Start: 2019-05-14 | End: 2021-06-01 | Stop reason: SDUPTHER

## 2019-05-14 NOTE — PATIENT INSTRUCTIONS
Follow up with PCP without improvement over next 5-7 days sooner/immediately for new or worsening       Viral Respiratory Infection: Care Instructions  Your Care Instructions    Viruses are very small organisms. They grow in number after they enter your body. There are many types that cause different illnesses, such as colds and the mumps. The symptoms of a viral respiratory infection often start quickly. They include a fever, sore throat, and runny nose. You may also just not feel well. Or you may not want to eat much. Most viral respiratory infections are not serious. They usually get better with time and self-care. Antibiotics are not used to treat a viral infection. That's because antibiotics will not help cure a viral illness. In some cases, antiviral medicine can help your body fight a serious viral infection. Follow-up care is a key part of your treatment and safety. Be sure to make and go to all appointments, and call your doctor if you are having problems. It's also a good idea to know your test results and keep a list of the medicines you take. How can you care for yourself at home? · Rest as much as possible until you feel better. · Be safe with medicines. Take your medicine exactly as prescribed. Call your doctor if you think you are having a problem with your medicine. You will get more details on the specific medicine your doctor prescribes. · Take an over-the-counter pain medicine, such as acetaminophen (Tylenol), ibuprofen (Advil, Motrin), or naproxen (Aleve), as needed for pain and fever. Read and follow all instructions on the label. Do not give aspirin to anyone younger than 20. It has been linked to Reye syndrome, a serious illness. · Drink plenty of fluids, enough so that your urine is light yellow or clear like water. Hot fluids, such as tea or soup, may help relieve congestion in your nose and throat.  If you have kidney, heart, or liver disease and have to limit fluids, talk with your doctor before you increase the amount of fluids you drink. · Try to clear mucus from your lungs by breathing deeply and coughing. · Gargle with warm salt water once an hour. This can help reduce swelling and throat pain. Use 1 teaspoon of salt mixed in 1 cup of warm water. · Do not smoke or allow others to smoke around you. If you need help quitting, talk to your doctor about stop-smoking programs and medicines. These can increase your chances of quitting for good. To avoid spreading the virus  · Cough or sneeze into a tissue. Then throw the tissue away. · If you don't have a tissue, use your hand to cover your cough or sneeze. Then clean your hand. You can also cough into your sleeve. · Wash your hands often. Use soap and warm water. Wash for 15 to 20 seconds each time. · If you don't have soap and water near you, you can clean your hands with alcohol wipes or gel. When should you call for help? Call your doctor now or seek immediate medical care if:    · You have a new or higher fever.     · Your fever lasts more than 48 hours.     · You have trouble breathing.     · You have a fever with a stiff neck or a severe headache.     · You are sensitive to light.     · You feel very sleepy or confused.    Watch closely for changes in your health, and be sure to contact your doctor if:    · You do not get better as expected. Where can you learn more? Go to http://genna-chester.info/. Enter Y599 in the search box to learn more about \"Viral Respiratory Infection: Care Instructions. \"  Current as of: September 5, 2018  Content Version: 11.9  © 8986-8578 [employer]. Care instructions adapted under license by [employer] (which disclaims liability or warranty for this information).  If you have questions about a medical condition or this instruction, always ask your healthcare professional. [address] any warranty or liability for your use of this information.

## 2019-05-14 NOTE — PROGRESS NOTES
26 yo male with history of schizophrenia, asthma and seizures here with caretaker for multiple concerns:    1. Cold symptoms or allergies  3 days ago visited with mom. Has new runny nose, sneezing, cough and ear pain. No known or measured fever  symptoms have continued. Has not tried meds. Denies: labored breathing, wheezing    2. Back pain  Chronic for years  No recent changes  Pain left lower back  Denies urinary symptoms. Worse with movement  Improved with tylenol    3. Thinks infection of right knee  Hx of cyst removal from skin in past  There is a bump he sees  No redness, pain or swelling  No fevers. Walking without issues             Past Medical History:   Diagnosis Date    Asthma     Seizures ([facility] [address]     last seizure 2001        Past Surgical History:   Procedure Laterality Date    HX OTHER SURGICAL Right 05/01/2018    Excision of Sebaceous cyst of right thigh by Dr. Winston Abebe. History reviewed. No pertinent family history. Social History     Socioeconomic History    Marital status: SINGLE     Spouse name: Not on file    Number of children: Not on file    Years of education: Not on file    Highest education level: Not on file   Occupational History    Not on file   Social Needs    Financial resource strain: Not on file    Food insecurity:     Worry: Not on file     Inability: Not on file    Transportation needs:     Medical: Not on file     Non-medical: Not on file   Tobacco Use    Smoking status: Light Tobacco Smoker     Packs/day: 1.00    Smokeless tobacco: Never Used   Substance and Sexual Activity    Alcohol use:  Yes     Alcohol/week: 0.6 - 1.2 oz     Types: 1 - 2 Shots of liquor per week     Comment: rare    Drug use: Yes     Types: Marijuana     Comment: socially, not often    Sexual activity: Yes     Partners: Female     Birth control/protection: None   Lifestyle    Physical activity:     Days per week: Not on file     Minutes per session: Not on file    Stress: Not on file   Relationships    Social connections:     Talks on phone: Not on file     Gets together: Not on file     Attends Zoroastrianism service: Not on file     Active member of club or organization: Not on file     Attends meetings of clubs or organizations: Not on file     Relationship status: Not on file    Intimate partner violence:     Fear of current or ex partner: Not on file     Emotionally abused: Not on file     Physically abused: Not on file     Forced sexual activity: Not on file   Other Topics Concern    Not on file   Social History Narrative    Not on file                ALLERGIES: Bee sting [sting, bee]; Coconut oil; and Mushroom    Review of Systems   Constitutional: Negative for activity change and appetite change. Neurological: Negative for dizziness. Psychiatric/Behavioral: Negative for behavioral problems, confusion and sleep disturbance. All other systems reviewed and are negative. Vitals:    05/14/19 0947 05/14/19 1020   BP: 121/75    Pulse: (!) 120 (!) 114   Resp: 18    Temp: 99.1 °F (37.3 °C)    SpO2: 97%    Weight: 188 lb 14.4 oz (85.7 kg)    Height: 5' 10\" (1.778 m)        Physical Exam   Constitutional: He is oriented to person, place, and time. No distress. Non-toxic appearing, well hydrated   HENT:   Mouth/Throat: No oropharyngeal exudate. Right TM normal. Left TM erythematous and bulging with pus behind. Erythematous nasal turbinates with nasal sniffling   OP mild erythema without swelling or exudate. Uvula midline. No oral lesions. Eyes: Pupils are equal, round, and reactive to light. Conjunctivae and EOM are normal. No scleral icterus. Neck: Normal range of motion. Neck supple. Cardiovascular: Regular rhythm and normal heart sounds. Exam reveals no gallop and no friction rub. No murmur heard. Tachycardia: 110 to 114 apical   Pulmonary/Chest: Effort normal. No respiratory distress. He has no wheezes. He has no rales.    Mild diminishments more prominent RLL and LLL. Otherwise good air movement. Normal effort. Abdominal: Soft. Bowel sounds are normal. He exhibits no distension and no mass. There is no tenderness. There is no rebound and no guarding. Musculoskeletal:        Thoracic back: Normal.        Back:    Lymphadenopathy:     He has no cervical adenopathy. Neurological: He is alert and oriented to person, place, and time. Skin: Skin is warm and dry. No rash noted. He is not diaphoretic. No erythema. No pallor. Right knee is normal appearing. No redness or swelling. Area of concern cw well healed past surgical scar. Non tender no fluctuance. No discharge or erythema. Psychiatric: He has a normal mood and affect. His behavior is normal. Thought content normal.   Nursing note and vitals reviewed. MDM     Differential Diagnosis; Clinical Impression; Plan:       CLINICAL IMPRESSION:  (J06.9) Viral upper respiratory illness  (primary encounter diagnosis)  (H66.002) Acute suppurative otitis media of left ear without spontaneous rupture of tympanic membrane, recurrence not specified  (R05) Cough  (L90.5) Scar of knee  (M54.5,  G89.29) Chronic left-sided low back pain without sciatica    Orders Placed This Encounter      amoxicillin-clavulanate (AUGMENTIN) 875-125 mg per tablet          Sig: Take 1 Tab by mouth two (2) times a day for 7 days. Dispense:  14 Tab          Refill:  0      albuterol (PROVENTIL HFA, VENTOLIN HFA, PROAIR HFA) 90 mcg/actuation inhaler           Sig: Take 2 Puffs by inhalation every four (4) hours as needed for Wheezing. Dispense:  1 Inhaler          Refill:  0      Plan:  Appears to be viral process; CXR no evidence of pneumonia or other abnormality  Tachy today in office, comparing past VS this seems consistent to previous office visit vitals in chart review. Mild diminishments on exam given hx of asthma have sent in albuterol.   Ear infection- start Augmentin  Back pain- chronic unchanged, continue tylenol per MAR  Knee- this appears to be old scar. No evidence of skin infection per conern  Maintain adequate fluid intake. We have reviewed concerning signs/symptoms, normal vs abnormal progression of medical condition and when to seek immediate medical attention. Advised ED evalution immediately for worsening or new symptoms. See your PCP if there is not at least some improvement in symptoms within the next 5-7 days. You should see your PCP for updates on your routine health maintenance. Procedures      XR Results (most recent):  Results from Appointment encounter on 05/14/19   XR CHEST PA LAT    Narrative Chest PA and lateral    History: Cough. Diminished breath sounds in the right lower and left lower  lobes. Tiredness. Comparison: None    Findings: The lungs are well expanded. No focal consolidation, pleural  effusion, or pneumothorax. The cardiomediastinal silhouette is unremarkable. The visualized osseous structures are unremarkable. Impression Impression:  No acute cardiopulmonary process.

## 2019-07-03 ENCOUNTER — OFFICE VISIT (OUTPATIENT)
Dept: INTERNAL MEDICINE CLINIC | Age: 24
End: 2019-07-03

## 2019-07-03 VITALS
TEMPERATURE: 97.9 F | OXYGEN SATURATION: 98 % | DIASTOLIC BLOOD PRESSURE: 87 MMHG | RESPIRATION RATE: 16 BRPM | SYSTOLIC BLOOD PRESSURE: 136 MMHG | BODY MASS INDEX: 26.95 KG/M2 | HEART RATE: 114 BPM | WEIGHT: 188.25 LBS | HEIGHT: 70 IN

## 2019-07-03 DIAGNOSIS — G89.29 CHRONIC BILATERAL LOW BACK PAIN WITHOUT SCIATICA: Primary | ICD-10-CM

## 2019-07-03 DIAGNOSIS — G89.29 CHRONIC BILATERAL THORACIC BACK PAIN: ICD-10-CM

## 2019-07-03 DIAGNOSIS — M54.6 CHRONIC BILATERAL THORACIC BACK PAIN: ICD-10-CM

## 2019-07-03 DIAGNOSIS — M54.50 CHRONIC BILATERAL LOW BACK PAIN WITHOUT SCIATICA: Primary | ICD-10-CM

## 2019-07-03 RX ORDER — ACETAMINOPHEN 325 MG/1
325-650 TABLET ORAL
Qty: 50 TAB | Refills: 2
Start: 2019-07-03 | End: 2019-09-19 | Stop reason: DRUGHIGH
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
## 2019-07-03 NOTE — PATIENT INSTRUCTIONS
1.  Physical Therapy (PT) should contact you to schedule appt in 2-3 business days. 2.  Please have the orders faxed to [phone], if you need the Tylenol/acetaminophen order changed on those forms. Learning About How to Have a Healthy Back What causes back pain? Back pain is often caused by overuse, strain, or injury. For example, people often hurt their backs playing sports or working in the yard, being jolted in a car accident, or lifting something too heavy. Aging plays a part too. Your bones and muscles tend to lose strength as you age, which makes injury more likely. The spongy discs between the bones of the spine (vertebrae) may suffer from wear and tear and no longer provide enough cushion between the bones. A disc that bulges or breaks open (herniated disc) can press on nerves, causing back pain. In some people, back pain is the result of arthritis, broken vertebrae caused by bone loss (osteoporosis), illness, or a spine problem. Although most people have back pain at one time or another, there are steps you can take to make it less likely. How can you have a healthy back? Reduce stress on your back through good posture Slumping or slouching alone may not cause low back pain. But after the back has been strained or injured, bad posture can make pain worse. · Sleep in a position that maintains your back's normal curves and on a mattress that feels comfortable. Sleep on your side with a pillow between your knees, or sleep on your back with a pillow under your knees. These positions can reduce strain on your back. · Stand and sit up straight. \"Good posture\" generally means your ears, shoulders, and hips are in a straight line. · If you must stand for a long time, put one foot on a stool, ledge, or box. Switch feet every now and then. · Sit in a chair that is low enough to let you place both feet flat on the floor with both knees nearly level with your hips.  If your chair or desk is too high, use a footrest to raise your knees. Place a small pillow, a rolled-up towel, or a lumbar roll in the curve of your back if you need extra support. · Try a kneeling chair, which helps tilt your hips forward. This takes pressure off your lower back. · Try sitting on an exercise ball. It can rock from side to side, which helps keep your back loose. · When driving, keep your knees nearly level with your hips. Sit straight, and drive with both hands on the steering wheel. Your arms should be in a slightly bent position. Reduce stress on your back through careful lifting · Squat down, bending at the hips and knees only. If you need to, put one knee to the floor and extend your other knee in front of you, bent at a right angle (half kneeling). · Press your chest straight forward. This helps keep your upper back straight while keeping a slight arch in your low back. · Hold the load as close to your body as possible, at the level of your belly button (navel). · Use your feet to change direction, taking small steps. · Lead with your hips as you change direction. Keep your shoulders in line with your hips as you move. · Set down your load carefully, squatting with your knees and hips only. Exercise and stretch your back · Do some exercise on most days of the week, if your doctor says it is okay. You can walk, run, swim, or cycle. · Stretch your back muscles. Here are a few exercises to try: 
? Lie on your back, and gently pull one bent knee to your chest. Put that foot back on the floor, and then pull the other knee to your chest. 
? Do pelvic tilts. Lie on your back with your knees bent. Tighten your stomach muscles. Pull your belly button (navel) in and up toward your ribs. You should feel like your back is pressing to the floor and your hips and pelvis are slightly lifting off the floor. Hold for 6 seconds while breathing smoothly. ? Sit with your back flat against a wall. · Keep your core muscles strong. The muscles of your back, belly (abdomen), and buttocks support your spine. ? Pull in your belly and imagine pulling your navel toward your spine. Hold this for 6 seconds, then relax. Remember to keep breathing normally as you tense your muscles. ? Do curl-ups. Always do them with your knees bent. Keep your low back on the floor, and curl your shoulders toward your knees using a smooth, slow motion. Keep your arms folded across your chest. If this bothers your neck, try putting your hands behind your neck (not your head), with your elbows spread apart. ? Lie on your back with your knees bent and your feet flat on the floor. Tighten your belly muscles, and then push with your feet and raise your buttocks up a few inches. Hold this position 6 seconds as you continue to breathe normally, then lower yourself slowly to the floor. Repeat 8 to 12 times. ? If you like group exercise, try Pilates or yoga. These classes have poses that strengthen the core muscles. Lead a healthy lifestyle · Stay at a healthy weight to avoid strain on your back. · Do not smoke. Smoking increases the risk of osteoporosis, which weakens the spine. If you need help quitting, talk to your doctor about stop-smoking programs and medicines. These can increase your chances of quitting for good. Where can you learn more? Go to http://genna-chester.info/. Enter L315 in the search box to learn more about \"Learning About How to Have a Healthy Back. \" Current as of: September 20, 2018 Content Version: 11.9 © 5794-6902 [employer]. Care instructions adapted under license by [employer] (which disclaims liability or warranty for this information). If you have questions about a medical condition or this instruction, always ask your healthcare professional. [address] any warranty or liability for your use of this information. Learning About Relief for Back Pain What is back tension and strain? Back strain happens when you overstretch, or pull, a muscle in your back. You may hurt your back in an accident or when you exercise or lift something. Most back pain will get better with rest and time. You can take care of yourself at home to help your back heal. 
What can you do first to relieve back pain? When you first feel back pain, try these steps: 
· Walk. Take a short walk (10 to 20 minutes) on a level surface (no slopes, hills, or stairs) every 2 to 3 hours. Walk only distances you can manage without pain, especially leg pain. · Relax. Find a comfortable position for rest. Some people are comfortable on the floor or a medium-firm bed with a small pillow under their head and another under their knees. Some people prefer to lie on their side with a pillow between their knees. Don't stay in one position for too long. · Try heat or ice. Try using a heating pad on a low or medium setting, or take a warm shower, for 15 to 20 minutes every 2 to 3 hours. Or you can buy single-use heat wraps that last up to 8 hours. You can also try an ice pack for 10 to 15 minutes every 2 to 3 hours. You can use an ice pack or a bag of frozen vegetables wrapped in a thin towel. There is not strong evidence that either heat or ice will help, but you can try them to see if they help. You may also want to try switching between heat and cold. · Take pain medicine exactly as directed. ? If the doctor gave you a prescription medicine for pain, take it as prescribed. ? If you are not taking a prescription pain medicine, ask your doctor if you can take an over-the-counter medicine. What else can you do? · Stretch and exercise. Exercises that increase flexibility may relieve your pain and make it easier for your muscles to keep your spine in a good, neutral position. And don't forget to keep walking. · Do self-massage. You can use self-massage to unwind after work or school or to energize yourself in the morning. You can easily massage your feet, hands, or neck. Self-massage works best if you are in comfortable clothes and are sitting or lying in a comfortable position. Use oil or lotion to massage bare skin. · Reduce stress. Back pain can lead to a vicious Augustine: Distress about the pain tenses the muscles in your back, which in turn causes more pain. Learn how to relax your mind and your muscles to lower your stress. Where can you learn more? Go to http://gennaPathJumpchester.info/. Enter G229 in the search box to learn more about \"Learning About Relief for Back Pain. \" Current as of: September 20, 2018 Content Version: 11.9 © 2511-1252 [employer]. Care instructions adapted under license by [employer] (which disclaims liability or warranty for this information). If you have questions about a medical condition or this instruction, always ask your healthcare professional. [place] [address] any warranty or liability for your use of this information. Back Care and Preventing Injuries: Care Instructions Your Care Instructions You can hurt your back doing many everyday activities: lifting a heavy box, bending down to garden, exercising at the gym, and even getting out of bed. But you can keep your back strong and healthy by doing some exercises. You also can follow a few tips for sitting, sleeping, and lifting to avoid hurting your back again. Talk to your doctor before you start an exercise program. Ask for help if you want to learn more about keeping your back healthy. Follow-up care is a key part of your treatment and safety. Be sure to make and go to all appointments, and call your doctor if you are having problems. It's also a good idea to know your test results and keep a list of the medicines you take. How can you care for yourself at home? · Stay at a healthy weight to avoid strain on your lower back. · Do not smoke. Smoking increases the risk of osteoporosis, which weakens the spine. If you need help quitting, talk to your doctor about stop-smoking programs and medicines. These can increase your chances of quitting for good. · Make sure you sleep in a position that maintains your back's normal curves and on a mattress that feels comfortable. Sleep on your side with a pillow between your knees, or sleep on your back with a pillow under your knees. These positions can reduce strain on your back. · When you get out of bed, lie on your side and bend both knees. Drop your feet over the edge of the bed as you push up with both arms. Scoot to the edge of the bed. Make sure your feet are in line with your rear end (buttocks), and then stand up. · If you must stand for a long time, put one foot on a stool, ledge, or box. Exercise to strengthen your back and other muscles · Get at least 30 minutes of exercise on most days of the week. Walking is a good choice. You also may want to do other activities, such as running, swimming, cycling, or playing tennis or team sports. · Stretch your back muscles. Here are few exercises to try: 
? Lie on your back with your knees bent and your feet flat on the floor. Gently pull one bent knee to your chest. Put that foot back on the floor, and then pull the other knee to your chest. Hold for 15 to 30 seconds. Repeat 2 to 4 times. ? Do pelvic tilts. Lie on your back with your knees bent. Tighten your stomach muscles. Pull your belly button (navel) in and up toward your ribs. You should feel like your back is pressing to the floor and your hips and pelvis are slightly lifting off the floor. Hold for 6 seconds while breathing smoothly. · Keep your core muscles strong. The muscles of your back, belly (abdomen), and buttocks support your spine. ? Pull in your belly, and imagine pulling your navel toward your spine. Hold this for 6 seconds, then relax. Remember to keep breathing normally as you tense your muscles. ? Do curl-ups. Always do them with your knees bent. Keep your low back on the floor, and curl your shoulders toward your knees using a smooth, slow motion. Keep your arms folded across your chest. If this bothers your neck, try putting your hands behind your neck (not your head), with your elbows spread apart. ? Lie on your back with your knees bent and your feet flat on the floor. Tighten your belly muscles, and then push with your feet and raise your buttocks up a few inches. Hold this position 6 seconds as you continue to breathe normally, then lower yourself slowly to the floor. Repeat 8 to 12 times. ? If you like group exercise, try Pilates or yoga. These classes have poses that strengthen the core muscles. Protect your back when you sit · Place a small pillow, a rolled-up towel, or a lumbar roll in the curve of your back if you need extra support. · Sit in a chair that is low enough to let you place both feet flat on the floor with both knees nearly level with your hips. If your chair or desk is too high, use a foot rest to raise your knees. · When driving, keep your knees nearly level with your hips. Sit straight, and drive with both hands on the steering wheel. Your arms should be in a slightly bent position. · Try a kneeling chair, which helps tilt your hips forward. This takes pressure off your lower back. · Try sitting on an exercise ball. It can rock from side to side, which helps keep your back loose. Lift properly · Squat down, bending at the hips and knees only. If you need to, put one knee to the floor and extend your other knee in front of you, bent at a right angle (half kneeling). · Press your chest straight forward. This helps keep your upper back straight while keeping a slight arch in your low back. · Hold the load as close to your body as possible, at the level of your navel. · Use your feet to change direction, taking small steps. · Lead with your hips as you change direction. Keep your shoulders in line with your hips as you move. Do not twist your body. · Set down your load carefully, squatting with your knees and hips only. When should you call for help? Watch closely for changes in your health, and be sure to contact your doctor if you have any problems. Where can you learn more? Go to http://genna-chester.info/. Enter S810 in the search box to learn more about \"Back Care and Preventing Injuries: Care Instructions. \" Current as of: September 20, 2018 Content Version: 11.9 © 1732-9755 [employer]. Care instructions adapted under license by [employer] (which disclaims liability or warranty for this information). If you have questions about a medical condition or this instruction, always ask your healthcare professional. [address] any warranty or liability for your use of this information.

## 2019-07-03 NOTE — PROGRESS NOTES
RM 16    Chief Complaint   Patient presents with    Back Pain     Patient c/o 5-8/10 back pain when waking up. 1. Have you been to the ER, urgent care clinic since your last visit? Hospitalized since your last visit? Yes Reason for visit: 5/14/19, UC, URI and ear infection    2. Have you seen or consulted any other health care providers outside of the [address] [place] [address] since your last visit? Include any pap smears or colon screening. No    Health Maintenance Due   Topic Date Due    Pneumococcal 0-64 years (1 of 1 - PPSV23) 04/28/2001    DTaP/Tdap/Td series (1 - Tdap) 04/28/2016     Abuse Screening Questionnaire 7/3/2019   Do you ever feel afraid of your partner? N   Are you in a relationship with someone who physically or mentally threatens you? N   Is it safe for you to go home?  Y     3 most recent PHQ Screens 7/3/2019   PHQ Not Done -   Little interest or pleasure in doing things Not at all   Feeling down, depressed, irritable, or hopeless Not at all   Total Score PHQ 2 0   Trouble falling or staying asleep, or sleeping too much -   Feeling tired or having little energy -   Poor appetite, weight loss, or overeating -   Feeling bad about yourself - or that you are a failure or have let yourself or your family down -   Trouble concentrating on things such as school, work, reading, or watching TV -   Moving or speaking so slowly that other people could have noticed; or the opposite being so fidgety that others notice -   Thoughts of being better off dead, or hurting yourself in some way -   PHQ 9 Score -

## 2019-07-24 ENCOUNTER — OFFICE VISIT (OUTPATIENT)
Dept: INTERNAL MEDICINE CLINIC | Age: 24
End: 2019-07-24

## 2019-07-24 VITALS
SYSTOLIC BLOOD PRESSURE: 122 MMHG | DIASTOLIC BLOOD PRESSURE: 81 MMHG | HEIGHT: 70 IN | TEMPERATURE: 97.8 F | HEART RATE: 96 BPM | OXYGEN SATURATION: 96 % | RESPIRATION RATE: 14 BRPM | WEIGHT: 193 LBS | BODY MASS INDEX: 27.63 KG/M2

## 2019-07-24 DIAGNOSIS — R35.0 FREQUENT URINATION: Primary | ICD-10-CM

## 2019-07-24 DIAGNOSIS — Z87.442 HISTORY OF KIDNEY STONES: ICD-10-CM

## 2019-07-24 DIAGNOSIS — J30.89 ALLERGIC RHINITIS DUE TO OTHER ALLERGIC TRIGGER, UNSPECIFIED SEASONALITY: ICD-10-CM

## 2019-07-24 DIAGNOSIS — Z91.02 ALLERGY TO FOOD DYE: ICD-10-CM

## 2019-07-24 LAB
BILIRUB UR QL STRIP: NEGATIVE
GLUCOSE UR-MCNC: NEGATIVE MG/DL
KETONES P FAST UR STRIP-MCNC: NEGATIVE MG/DL
PH UR STRIP: 8.5 [PH] (ref 4.6–8)
PROT UR QL STRIP: NEGATIVE
SP GR UR STRIP: 1.02 (ref 1–1.03)
UA UROBILINOGEN AMB POC: ABNORMAL (ref 0.2–1)
URINALYSIS CLARITY POC: CLEAR
URINALYSIS COLOR POC: YELLOW
URINE BLOOD POC: ABNORMAL
URINE LEUKOCYTES POC: NEGATIVE
URINE NITRITES POC: NEGATIVE

## 2019-07-24 RX ORDER — MINERAL OIL
180 ENEMA (ML) RECTAL DAILY
Qty: 30 TAB | Refills: 5 | Status: SHIPPED | OUTPATIENT
Start: 2019-07-24 | End: 2020-02-03

## 2019-07-24 NOTE — PATIENT INSTRUCTIONS
Results for orders placed or performed in visit on 07/24/19   AMB POC URINALYSIS DIP STICK AUTO W/O MICRO   Result Value Ref Range    Color (UA POC) Yellow     Clarity (UA POC) Clear     Glucose (UA POC) Negative Negative    Bilirubin (UA POC) Negative Negative    Ketones (UA POC) Negative Negative    Specific gravity (UA POC) 1.020 1.001 - 1.035    Blood (UA POC) Trace Negative    pH (UA POC) 8.5 (A) 4.6 - 8.0    Protein (UA POC) Negative Negative    Urobilinogen (UA POC) 1 mg/dL 0.2 - 1    Nitrites (UA POC) Negative Negative    Leukocyte esterase (UA POC) Negative Negative

## 2019-07-24 NOTE — PROGRESS NOTES
History of Present Illness:   Shikha Keene is a 25 y.o. male here for evaluation:    Chief Complaint   Patient presents with    Kidney Stone     Patient reports having pain to left side and vomit x2 , reports vomit was reddish. He ntotes at school today and had 2 bottles of red [facility]. He threw up. He has emesis of [facility] in past.  He has emesis with red dye. Added to allergies. Pain started after drinking [facility]. Pain resolved. He notes no meds for nausea needed. He doesn't need note for day program.      He is not taking meds for allergies/pollen. Not interested in allergy meds at this time. Nursing screenings reviewed by provider at visit. Prior to Admission medications    Medication Sig Start Date End Date Taking? Authorizing Provider   acetaminophen (TYLENOL) 325 mg tablet Take 1-2 Tabs by mouth every six (6) hours as needed for Pain or Fever (including headache or lower back pain). 7/3/19  Yes Nadia Melara MD   albuterol (PROVENTIL HFA, VENTOLIN HFA, PROAIR HFA) 90 mcg/actuation inhaler Take 2 Puffs by inhalation every four (4) hours as needed for Wheezing. 5/14/19  Yes Kaylah Barcenas NP   QUEtiapine (SEROQUEL) 300 mg tablet  2/15/19  Yes Provider, Historical   OLANZapine (ZYPREXA) 5 mg tablet Take 2.5 mg by mouth nightly. Yes Provider, Historical   LORazepam (ATIVAN) 2 mg tablet Take  by mouth every six (6) hours as needed for Anxiety. Yes Provider, Historical   chlorproMAZINE (THORAZINE) 100 mg tablet Take 100 mg by mouth three (3) times daily as needed. Yes Provider, Historical   atomoxetine (STRATTERA) 100 mg capsule Take 100 mg by mouth daily. Yes Provider, Historical   docusate sodium (COLACE) 100 mg capsule Take 100 mg by mouth two (2) times a day. Yes Provider, Historical   divalproex DR (DEPAKOTE) 500 mg tablet Take  by mouth three (3) times daily.    Yes Provider, Historical   polyethylene glycol (MIRALAX) 17 gram/dose powder MIX 1 CAPFUL (SEE 17 gm LINE) IN A GLASS OF WATER & DRINK ONCE DAILY FOR CONSTIPATION. 12/10/18   Alberta Oviedo MD        ROS    Vitals:    07/24/19 1332 07/24/19 1440   BP: (!) 151/105 122/81   Pulse: 96 96   Resp: 14    Temp: 97.8 °F (36.6 °C)    TempSrc: Oral    SpO2: 96%    Weight: 193 lb (87.5 kg)    Height: 5' 10\" (1.778 m)    PainSc:   3    PainLoc: Abdomen       Body mass index is 27.69 kg/m². Physical Exam:     Physical Exam   Constitutional: He appears well-developed and well-nourished. No distress. HENT:   Head: Normocephalic and atraumatic. Eyes: Conjunctivae are normal. Right eye exhibits no discharge. Left eye exhibits no discharge. No scleral icterus. Neck: Neck supple. Cardiovascular: Normal rate, regular rhythm, normal heart sounds and intact distal pulses. Exam reveals no gallop and no friction rub. No murmur heard. Pulmonary/Chest: Effort normal and breath sounds normal. No respiratory distress. He has no wheezes. He has no rales. Abdominal: Soft. Bowel sounds are normal. He exhibits no distension. There is no tenderness. Musculoskeletal: He exhibits no edema or tenderness. Neurological: He is alert. He exhibits normal muscle tone. Coordination normal.   Skin: Skin is warm. No rash noted. He is not diaphoretic. No erythema. No pallor. Psychiatric: He has a normal mood and affect.  His behavior is normal. Judgment and thought content normal.     Results for orders placed or performed in visit on 07/24/19   AMB POC URINALYSIS DIP STICK AUTO W/O MICRO   Result Value Ref Range    Color (UA POC) Yellow     Clarity (UA POC) Clear     Glucose (UA POC) Negative Negative    Bilirubin (UA POC) Negative Negative    Ketones (UA POC) Negative Negative    Specific gravity (UA POC) 1.020 1.001 - 1.035    Blood (UA POC) Trace Negative    pH (UA POC) 8.5 (A) 4.6 - 8.0    Protein (UA POC) Negative Negative    Urobilinogen (UA POC) 1 mg/dL 0.2 - 1    Nitrites (UA POC) Negative Negative Leukocyte esterase (UA POC) Negative Negative       Assessment and Plan:       ICD-10-CM ICD-9-CM    1. Frequent urination R35.0 788.41 AMB POC URINALYSIS DIP STICK AUTO W/O MICRO   2. History of kidney stones Z87.442 V13.01    3. Allergic rhinitis due to other allergic trigger, unspecified seasonality J30.89 477.8 fexofenadine (ALLEGRA) 180 mg tablet   4. 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 Due to no typical pain c/w kidney stones, favor reaction to food as above. 3.  Mgt reviewed PRN. 4.  Updated in allergy history at visit:    Allergies   Allergen Reactions    Bee Sting [Sting, Bee] Sneezing    Coconut Oil Hives    Mushroom Nausea and Vomiting    Red Dye Nausea and Vomiting     Has with fruit punch or [facility]. Follow-up and Dispositions    · Return if symptoms worsen or fail to improve. reviewed medications and side effects in detail    For additional documentation of information and/or recommendations discussed this visit, please see notes in instructions. Plan and evaluation (above) reviewed with pt at visit  Patient voiced understanding of plan and provided with time to ask/review questions. After Visit Summary (AVS) provided to pt after visit with additional instructions as needed/reviewed.

## 2019-07-24 NOTE — PROGRESS NOTES
RM 15    Chief Complaint   Patient presents with    Kidney Stone     Patient reports having pain to left side of abd and vomitting x2 today , reports vomit was reddish. 1. Have you been to the ER, urgent care clinic since your last visit? Hospitalized since your last visit? No    2. Have you seen or consulted any other health care providers outside of the [address] [place] [address] since your last visit? Include any pap smears or colon screening. Yes Reason for visit: Psychiatrist, July 2019     Health Maintenance Due   Topic Date Due    DTaP/Tdap/Td series (1 - Tdap) 04/28/2016     Abuse Screening Questionnaire 7/24/2019   Do you ever feel afraid of your partner? N   Are you in a relationship with someone who physically or mentally threatens you? N   Is it safe for you to go home? Y       Patient states he has thoughts of suicide last night does not usually occur.    3 most recent PHQ Screens 7/24/2019   PHQ Not Done -   Little interest or pleasure in doing things Not at all   Feeling down, depressed, irritable, or hopeless Several days   Total Score PHQ 2 1   Trouble falling or staying asleep, or sleeping too much -   Feeling tired or having little energy -   Poor appetite, weight loss, or overeating -   Feeling bad about yourself - or that you are a failure or have let yourself or your family down -   Trouble concentrating on things such as school, work, reading, or watching TV -   Moving or speaking so slowly that other people could have noticed; or the opposite being so fidgety that others notice -   Thoughts of being better off dead, or hurting yourself in some way -   PHQ 9 Score -     Learning Assessment 7/24/2019   PRIMARY LEARNER Patient   HIGHEST LEVEL OF EDUCATION - PRIMARY LEARNER  -   BARRIERS PRIMARY LEARNER NONE   CO-LEARNER CAREGIVER -   PRIMARY LANGUAGE ENGLISH   LEARNER PREFERENCE PRIMARY VIDEOS   ANSWERED BY patient   RELATIONSHIP SELF

## 2019-08-09 RX ORDER — CHLORPHENIRAMIN/PSEUDOEPHED/DM 1-15-5MG/5
LIQUID (ML) ORAL
Qty: 510 G | Refills: 11 | Status: SHIPPED | OUTPATIENT
Start: 2019-08-09 | End: 2021-06-01

## 2019-08-18 ENCOUNTER — HOSPITAL ENCOUNTER (EMERGENCY)
Age: 24
Discharge: HOME | End: 2019-08-18
Attending: EMERGENCY MEDICINE
Payer: MEDICAID

## 2019-08-18 VITALS — HEART RATE: 91 BPM | OXYGEN SATURATION: 98 %

## 2019-08-18 DIAGNOSIS — F19.10 SUBSTANCE ABUSE (HCC): Primary | ICD-10-CM

## 2019-08-18 PROCEDURE — 99282 EMERGENCY DEPT VISIT SF MDM: CPT
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
## 2019-08-19 NOTE — ED PROVIDER NOTES
17yo M reports depression and addiction to weed. Pt had fight with . Increased anxiety and didn't want to stay at house any more. Brought to ED by police. Denies etoh. Last smoked marijuana today. Denies any SI/HI. Pt plans to go to [employer] but she lives in [place]. Past Medical History:   Diagnosis Date    Asthma     Seizures ([address]     last seizure 2001       Past Surgical History:   Procedure Laterality Date    HX OTHER SURGICAL Right 05/01/2018    Excision of Sebaceous cyst of right thigh by Dr. Emily Otto. No family history on file. Social History     Socioeconomic History    Marital status: SINGLE     Spouse name: Not on file    Number of children: Not on file    Years of education: Not on file    Highest education level: Not on file   Occupational History    Not on file   Social Needs    Financial resource strain: Not on file    Food insecurity:     Worry: Not on file     Inability: Not on file    Transportation needs:     Medical: Not on file     Non-medical: Not on file   Tobacco Use    Smoking status: Current Every Day Smoker     Packs/day: 1.00    Smokeless tobacco: Never Used   Substance and Sexual Activity    Alcohol use:  Yes     Alcohol/week: 1.0 - 2.0 standard drinks     Types: 1 - 2 Shots of liquor per week     Comment: rare    Drug use: Yes     Types: Marijuana     Comment: socially, not often    Sexual activity: Yes     Partners: Female     Birth control/protection: None   Lifestyle    Physical activity:     Days per week: Not on file     Minutes per session: Not on file    Stress: Not on file   Relationships    Social connections:     Talks on phone: Not on file     Gets together: Not on file     Attends Nondenominational service: Not on file     Active member of club or organization: Not on file     Attends meetings of clubs or organizations: Not on file     Relationship status: Not on file    Intimate partner violence:     Fear of current or ex partner: Not on file     Emotionally abused: Not on file     Physically abused: Not on file     Forced sexual activity: Not on file   Other Topics Concern    Not on file   Social History Narrative    Not on file         ALLERGIES: Bee sting [sting, bee]; Coconut oil; Mushroom; and Red dye    Review of Systems   Constitutional: Negative for diaphoresis and fever. HENT: Negative for facial swelling. Eyes: Negative for visual disturbance. Respiratory: Negative for cough. Cardiovascular: Negative for chest pain. Gastrointestinal: Negative for abdominal pain. Genitourinary: Negative for dysuria. Musculoskeletal: Negative for joint swelling. Skin: Negative for rash. Neurological: Negative for headaches. Hematological: Negative for adenopathy. Psychiatric/Behavioral: Negative for suicidal ideas. Vitals:    08/18/19 2236   Pulse: 91   SpO2: 98%            Physical Exam   Constitutional: He is oriented to person, place, and time. He appears well-developed and well-nourished. No distress. HENT:   Head: Normocephalic and atraumatic. Eyes: Pupils are equal, round, and reactive to light. Neck: Normal range of motion. Neck supple. Cardiovascular: Normal rate. Pulmonary/Chest: Effort normal. No respiratory distress. Abdominal: He exhibits no distension. Musculoskeletal: Normal range of motion. He exhibits no edema. Neurological: He is alert and oriented to person, place, and time. Skin: Skin is warm and dry. Nursing note and vitals reviewed. MDM  Number of Diagnoses or Management Options  Diagnosis management comments: A:  Outpatient addiction resources provided. No need for [employer] consult at this time. Stable for discharge home.          Procedures

## 2019-08-19 NOTE — ED NOTES
MD reviewed discharge instructions and options with patient and patient verbalized understanding. RN reviewed discharge instructions using teachback method. Pt ambulated to exit without difficulty and in no signs of acute distress. Ambulatory out of department, and his mom  will drive home. No complaints or needs expressed at this time. Patient was counseled on medications prescribed at discharge. VSS, verbalized relief from most intense pain. Patient to call [address] in the morning for appointment.

## 2019-09-19 ENCOUNTER — HOSPITAL ENCOUNTER (INPATIENT)
Age: 24
LOS: 3 days | Discharge: HOME | DRG: 812 | End: 2019-09-22
Attending: EMERGENCY MEDICINE | Admitting: HOSPITALIST
Payer: MEDICAID

## 2019-09-19 DIAGNOSIS — Z79.899 POLYPHARMACY: ICD-10-CM

## 2019-09-19 DIAGNOSIS — R41.82 ALTERED MENTAL STATUS, UNSPECIFIED ALTERED MENTAL STATUS TYPE: ICD-10-CM

## 2019-09-19 DIAGNOSIS — T42.6X1A VALPROIC ACID TOXICITY, ACCIDENTAL OR UNINTENTIONAL, INITIAL ENCOUNTER: Primary | ICD-10-CM

## 2019-09-19 LAB
ALBUMIN SERPL-MCNC: 4 G/DL (ref 3.5–5)
ALBUMIN/GLOB SERPL: 1 {RATIO} (ref 1.1–2.2)
ALP SERPL-CCNC: 72 U/L (ref 45–117)
ALT SERPL-CCNC: 41 U/L (ref 12–78)
AMPHET UR QL SCN: NEGATIVE
ANION GAP SERPL CALC-SCNC: 4 MMOL/L (ref 5–15)
APAP SERPL-MCNC: <2 UG/ML (ref 10–30)
APPEARANCE UR: CLEAR
AST SERPL-CCNC: 19 U/L (ref 15–37)
ATRIAL RATE: 94 BPM
BACTERIA URNS QL MICRO: NEGATIVE /HPF
BARBITURATES UR QL SCN: NEGATIVE
BASOPHILS # BLD: 0 K/UL (ref 0–0.1)
BASOPHILS NFR BLD: 0 % (ref 0–1)
BENZODIAZ UR QL: NEGATIVE
BILIRUB SERPL-MCNC: 0.4 MG/DL (ref 0.2–1)
BILIRUB UR QL: NEGATIVE
BUN SERPL-MCNC: 8 MG/DL (ref 6–20)
BUN/CREAT SERPL: 9 (ref 12–20)
CALCIUM SERPL-MCNC: 9.6 MG/DL (ref 8.5–10.1)
CALCULATED P AXIS, ECG09: 50 DEGREES
CALCULATED R AXIS, ECG10: 38 DEGREES
CALCULATED T AXIS, ECG11: 70 DEGREES
CANNABINOIDS UR QL SCN: NEGATIVE
CHLORIDE SERPL-SCNC: 105 MMOL/L (ref 97–108)
CO2 SERPL-SCNC: 31 MMOL/L (ref 21–32)
COCAINE UR QL SCN: NEGATIVE
COLOR UR: NORMAL
COMMENT, HOLDF: NORMAL
COMMENT, HOLDF: NORMAL
CREAT SERPL-MCNC: 0.91 MG/DL (ref 0.7–1.3)
DIAGNOSIS, 93000: NORMAL
DIFFERENTIAL METHOD BLD: ABNORMAL
DRUG SCRN COMMENT,DRGCM: NORMAL
EOSINOPHIL # BLD: 0 K/UL (ref 0–0.4)
EOSINOPHIL NFR BLD: 1 % (ref 0–7)
EPITH CASTS URNS QL MICRO: NORMAL /LPF
ERYTHROCYTE [DISTWIDTH] IN BLOOD BY AUTOMATED COUNT: 12 % (ref 11.5–14.5)
ETHANOL SERPL-MCNC: <10 MG/DL
GLOBULIN SER CALC-MCNC: 4.2 G/DL (ref 2–4)
GLUCOSE SERPL-MCNC: 93 MG/DL (ref 65–100)
GLUCOSE UR STRIP.AUTO-MCNC: NEGATIVE MG/DL
HCT VFR BLD AUTO: 44.6 % (ref 36.6–50.3)
HGB BLD-MCNC: 15 G/DL (ref 12.1–17)
HGB UR QL STRIP: NEGATIVE
HYALINE CASTS URNS QL MICRO: NORMAL /LPF (ref 0–5)
IMM GRANULOCYTES # BLD AUTO: 0 K/UL (ref 0–0.04)
IMM GRANULOCYTES NFR BLD AUTO: 0 % (ref 0–0.5)
KETONES UR QL STRIP.AUTO: NEGATIVE MG/DL
LEUKOCYTE ESTERASE UR QL STRIP.AUTO: NEGATIVE
LYMPHOCYTES # BLD: 1.3 K/UL (ref 0.8–3.5)
LYMPHOCYTES NFR BLD: 37 % (ref 12–49)
MCH RBC QN AUTO: 31.2 PG (ref 26–34)
MCHC RBC AUTO-ENTMCNC: 33.6 G/DL (ref 30–36.5)
MCV RBC AUTO: 92.7 FL (ref 80–99)
METHADONE UR QL: NEGATIVE
MONOCYTES # BLD: 0.5 K/UL (ref 0–1)
MONOCYTES NFR BLD: 15 % (ref 5–13)
NEUTS SEG # BLD: 1.7 K/UL (ref 1.8–8)
NEUTS SEG NFR BLD: 47 % (ref 32–75)
NITRITE UR QL STRIP.AUTO: NEGATIVE
NRBC # BLD: 0 K/UL (ref 0–0.01)
NRBC BLD-RTO: 0 PER 100 WBC
OPIATES UR QL: NEGATIVE
P-R INTERVAL, ECG05: 122 MS
PCP UR QL: NEGATIVE
PH UR STRIP: 7.5 [PH] (ref 5–8)
PLATELET # BLD AUTO: 110 K/UL (ref 150–400)
PMV BLD AUTO: 11.5 FL (ref 8.9–12.9)
POTASSIUM SERPL-SCNC: 3.8 MMOL/L (ref 3.5–5.1)
PROT SERPL-MCNC: 8.2 G/DL (ref 6.4–8.2)
PROT UR STRIP-MCNC: NEGATIVE MG/DL
Q-T INTERVAL, ECG07: 344 MS
QRS DURATION, ECG06: 104 MS
QTC CALCULATION (BEZET), ECG08: 430 MS
RBC # BLD AUTO: 4.81 M/UL (ref 4.1–5.7)
RBC #/AREA URNS HPF: NORMAL /HPF (ref 0–5)
SALICYLATES SERPL-MCNC: <1.7 MG/DL (ref 2.8–20)
SAMPLES BEING HELD,HOLD: NORMAL
SAMPLES BEING HELD,HOLD: NORMAL
SODIUM SERPL-SCNC: 140 MMOL/L (ref 136–145)
SP GR UR REFRACTOMETRY: <1.005 (ref 1–1.03)
UR CULT HOLD, URHOLD: NORMAL
UROBILINOGEN UR QL STRIP.AUTO: 1 EU/DL (ref 0.2–1)
VALPROATE SERPL-MCNC: 103 UG/ML (ref 50–100)
VALPROATE SERPL-MCNC: 135 UG/ML (ref 50–100)
VENTRICULAR RATE, ECG03: 94 BPM
WBC # BLD AUTO: 3.6 K/UL (ref 4.1–11.1)
WBC URNS QL MICRO: NORMAL /HPF (ref 0–4)

## 2019-09-19 PROCEDURE — 65660000000 HC RM CCU STEPDOWN

## 2019-09-19 PROCEDURE — 81001 URINALYSIS AUTO W/SCOPE: CPT

## 2019-09-19 PROCEDURE — 99285 EMERGENCY DEPT VISIT HI MDM: CPT

## 2019-09-19 PROCEDURE — 80164 ASSAY DIPROPYLACETIC ACD TOT: CPT

## 2019-09-19 PROCEDURE — 96360 HYDRATION IV INFUSION INIT: CPT

## 2019-09-19 PROCEDURE — 36415 COLL VENOUS BLD VENIPUNCTURE: CPT

## 2019-09-19 PROCEDURE — 80165 DIPROPYLACETIC ACID FREE: CPT

## 2019-09-19 PROCEDURE — 85025 COMPLETE CBC W/AUTO DIFF WBC: CPT

## 2019-09-19 PROCEDURE — 74011250636 HC RX REV CODE- 250/636: Performed by: EMERGENCY MEDICINE

## 2019-09-19 PROCEDURE — 80053 COMPREHEN METABOLIC PANEL: CPT

## 2019-09-19 PROCEDURE — 80307 DRUG TEST PRSMV CHEM ANLYZR: CPT

## 2019-09-19 PROCEDURE — 74011250636 HC RX REV CODE- 250/636: Performed by: HOSPITALIST

## 2019-09-19 PROCEDURE — 74011250637 HC RX REV CODE- 250/637: Performed by: HOSPITALIST

## 2019-09-19 PROCEDURE — 93005 ELECTROCARDIOGRAM TRACING: CPT

## 2019-09-19 RX ORDER — SERTRALINE HYDROCHLORIDE 50 MG/1
50 TABLET, FILM COATED ORAL DAILY
COMMUNITY
End: 2021-06-01

## 2019-09-19 RX ORDER — LORAZEPAM 0.5 MG/1
0.5 TABLET ORAL 2 TIMES DAILY
COMMUNITY
End: 2021-06-01

## 2019-09-19 RX ORDER — SODIUM CHLORIDE 0.9 % (FLUSH) 0.9 %
5-40 SYRINGE (ML) INJECTION EVERY 8 HOURS
Status: DISCONTINUED | OUTPATIENT
Start: 2019-09-19 | End: 2019-09-22 | Stop reason: HOSPADM

## 2019-09-19 RX ORDER — ACETAMINOPHEN 325 MG/1
650 TABLET ORAL
COMMUNITY

## 2019-09-19 RX ORDER — SODIUM CHLORIDE 0.9 % (FLUSH) 0.9 %
5-40 SYRINGE (ML) INJECTION AS NEEDED
Status: DISCONTINUED | OUTPATIENT
Start: 2019-09-19 | End: 2019-09-22 | Stop reason: HOSPADM

## 2019-09-19 RX ORDER — LORAZEPAM 0.5 MG/1
0.5 TABLET ORAL 2 TIMES DAILY
Status: DISCONTINUED | OUTPATIENT
Start: 2019-09-19 | End: 2019-09-22 | Stop reason: HOSPADM

## 2019-09-19 RX ORDER — SODIUM CHLORIDE 9 MG/ML
125 INJECTION, SOLUTION INTRAVENOUS CONTINUOUS
Status: DISCONTINUED | OUTPATIENT
Start: 2019-09-19 | End: 2019-09-19 | Stop reason: SDUPTHER

## 2019-09-19 RX ORDER — IPRATROPIUM BROMIDE AND ALBUTEROL SULFATE 2.5; .5 MG/3ML; MG/3ML
3 SOLUTION RESPIRATORY (INHALATION)
Status: DISCONTINUED | OUTPATIENT
Start: 2019-09-19 | End: 2019-09-22 | Stop reason: HOSPADM

## 2019-09-19 RX ORDER — LORATADINE 10 MG/1
10 TABLET ORAL DAILY
Status: DISCONTINUED | OUTPATIENT
Start: 2019-09-20 | End: 2019-09-22 | Stop reason: HOSPADM

## 2019-09-19 RX ORDER — QUETIAPINE FUMARATE 400 MG/1
400 TABLET, FILM COATED ORAL 3 TIMES DAILY
COMMUNITY
End: 2021-06-01

## 2019-09-19 RX ORDER — SODIUM CHLORIDE 9 MG/ML
125 INJECTION, SOLUTION INTRAVENOUS CONTINUOUS
Status: DISCONTINUED | OUTPATIENT
Start: 2019-09-19 | End: 2019-09-20

## 2019-09-19 RX ORDER — OLANZAPINE 10 MG/1
15 TABLET ORAL
COMMUNITY

## 2019-09-19 RX ADMIN — LORAZEPAM 0.5 MG: 0.5 TABLET ORAL at 17:29

## 2019-09-19 RX ADMIN — SODIUM CHLORIDE 125 ML/HR: 900 INJECTION, SOLUTION INTRAVENOUS at 17:29

## 2019-09-19 RX ADMIN — SODIUM CHLORIDE 1000 ML: 900 INJECTION, SOLUTION INTRAVENOUS at 08:16

## 2019-09-19 NOTE — PROGRESS NOTES
TRANSFER - IN REPORT:    Verbal report received from Jailyn(name) on Alysa Net  being received from ED(unit) for routine progression of care      Report consisted of patients Situation, Background, Assessment and   Recommendations(SBAR). Information from the following report(s) SBAR, Kardex, Intake/Output, MAR, Recent Results and Cardiac Rhythm NSR was reviewed with the receiving nurse. Opportunity for questions and clarification was provided. Assessment completed upon patients arrival to unit and care assumed.

## 2019-09-19 NOTE — H&P
H&P dictated. Job ID: [id number]    A/p    Valproic acid toxicity: supportive therapy,monitoring,serial VA level. Poision control advised holding antipsychotics.     Daja Kellogg MD

## 2019-09-19 NOTE — ED NOTES
RN spoke with Ernestine with poison control. Recommendation is for hydration, serial valproic acid levels q 6 hours until within normal range and to treat any tachycardia, seizures, and agitation with benzos. Dr. Merrill Cruz notified.

## 2019-09-19 NOTE — ED TRIAGE NOTES
Patient arrives via EMS from his group home after being seen by his doctor yesterday where they increased some of his medications. Patient is now c/o dizziness, nausea, lethargy and AMS.

## 2019-09-19 NOTE — PROGRESS NOTES
Admission Medication Reconciliation:    Information obtained from:  Medication list provided by group home, patient's mother, Shar Georges, [address]: Yes    Comments/Recommendations: Updated PTA meds/reviewed patient's allergies. 1)  I completed the medication reconciliation using a medication list provided by the patient's group home, his mother, and [facility] pharmacy. Of note, the patient takes his medications at 7am, 2pm, and 8pm.  He took atomoxetine 100mg, divalproex DR 500mg, lorazepam 0.5mg, and quetiapine 400mg, and sertraline 50mg this morning at 7am.    2)  Medication changes (since last review): Added  - Sertraline 50mg 1 tab PO every day    Adjusted  - Acetaminophen 325mg 2 tab PO Q6H PRN pain or fever -- previously 1-2 tab  - Olanzapine 10mg 1 tab PO nightly -- previously 2.5mg  - Quetiapine 400mg 1 tab PO TID (7am, 2pm, 8pm) -- previously 300mg  - Lorazepam 0.5mg PO BID -- previously 2mg Q6H prn anxiety    Removed  - Chlorpromazine  - Docusate     3)  Confirmed allergies. No new allergies to note. ¹[employer] pharmacy benefit data reflects medications filled and processed through the patient's insurance, however   this data does NOT capture whether the medication was picked up or is currently being taken by the patient. Allergies:  Bee sting [sting, bee]; Coconut oil; Mushroom; and Red dye    Significant PMH/Disease States:   Past Medical History:   Diagnosis Date    Asthma     Seizures ([address]     last seizure 2001     Chief Complaint for this Admission:    Chief Complaint   Patient presents with    Altered mental status     Prior to Admission Medications:   Prior to Admission Medications   Prescriptions Last Dose Informant Patient Reported? Taking? CLEARLAX 17 gram/dose powder 9/19/2019 at 0700  No Yes   Sig: MIX 1 CAPFUL (SEE 17 gm LINE) IN A GLASS OF WATER & DRINK ONCE DAILY FOR CONSTIPATION.    LORazepam (ATIVAN) 0.5 mg tablet 9/19/2019 at 0700  Yes Yes   Sig: Take 0.5 mg by mouth two (2) times a day. OLANZapine (ZYPREXA) 10 mg tablet 9/18/2019 at 2000  Yes Yes   Sig: Take 10 mg by mouth nightly. QUEtiapine (SEROQUEL) 400 mg tablet 9/19/2019 at 0700  Yes Yes   Sig: Take 400 mg by mouth three (3) times daily. 7am, 2pm, 8pm   acetaminophen (TYLENOL) 325 mg tablet   Yes Yes   Sig: Take 650 mg by mouth every six (6) hours as needed for Pain or Fever. albuterol (PROVENTIL HFA, VENTOLIN HFA, PROAIR HFA) 90 mcg/actuation inhaler   No Yes   Sig: Take 2 Puffs by inhalation every four (4) hours as needed for Wheezing. atomoxetine (STRATTERA) 100 mg capsule 9/19/2019 at 0700  Yes Yes   Sig: Take 100 mg by mouth daily. divalproex DR (DEPAKOTE) 500 mg tablet 9/19/2019 at 0700  Yes Yes   Sig: Take  by mouth three (3) times daily. fexofenadine (ALLEGRA) 180 mg tablet 9/19/2019 at 0700  No Yes   Sig: Take 1 Tab by mouth daily. sertraline (ZOLOFT) 50 mg tablet 9/19/2019 at 0700  Yes Yes   Sig: Take 50 mg by mouth daily. Facility-Administered Medications: None       Please contact the main inpatient pharmacy with any questions or concerns at [phone] and we will direct you to the clinical pharmacist covering this patient's care while in-house.      Nkechi Ferrara, PharmD Candidate [id number]

## 2019-09-19 NOTE — ED NOTES
TRANSFER - OUT REPORT:    Verbal report given to CHUY Frye(name) on Camelia Alert  being transferred to [facility](unit) for routine progression of care       Report consisted of patients Situation, Background, Assessment and   Recommendations(SBAR). Information from the following report(s) SBAR, Kardex, ED Summary, Intake/Output, MAR, Recent Results and Cardiac Rhythm NSR was reviewed with the receiving nurse. Lines:   Peripheral IV 09/19/19 Left Antecubital (Active)   Site Assessment Clean, dry, & intact 9/19/2019  8:10 AM   Phlebitis Assessment 0 9/19/2019  8:10 AM   Infiltration Assessment 0 9/19/2019  8:10 AM   Dressing Status Clean, dry, & intact 9/19/2019  8:10 AM   Dressing Type Transparent 9/19/2019  8:10 AM   Hub Color/Line Status Pink 9/19/2019  8:10 AM   Action Taken Blood drawn 9/19/2019  8:10 AM        Opportunity for questions and clarification was provided.

## 2019-09-19 NOTE — ED PROVIDER NOTES
25 y.o. male with past medical history significant for seizures, and asthma who presents from group home via EMS with chief complaint of AMS. Pt presents with altered mental status complains of nausea, headaches, dizziness, and fatigue after recently having changes in his medications. EMS reports the pt was seen by his doctor yesterday who increased doses of medications. Pt lives in a group home and staff is unsure of which medications were changed. Pt denies vomiting, hallucinations, HI, or SI. There are no other acute medical concerns at this time. Social hx: Current Smoker. Rare EtOH use. Denies Drug Use. Lives in group home. PCP: David Ballesteros MD    Note written by Leodan Pope, as dictated by Nik Woodall MD 8:01 AM      The history is provided by the patient and the EMS personnel. Past Medical History:   Diagnosis Date    Asthma     [address] [address]     last seizure 2001       Past Surgical History:   Procedure Laterality Date    HX OTHER SURGICAL Right 05/01/2018    Excision of Sebaceous cyst of right thigh by Dr. Rip Calvillo. History reviewed. No pertinent family history. Social History     Socioeconomic History    Marital status: SINGLE     Spouse name: Not on file    Number of children: Not on file    Years of education: Not on file    Highest education level: Not on file   Occupational History    Not on file   Social Needs    Financial resource strain: Not on file    Food insecurity:     Worry: Not on file     Inability: Not on file    Transportation needs:     Medical: Not on file     Non-medical: Not on file   Tobacco Use    Smoking status: Current Every Day Smoker     Packs/day: 1.00    Smokeless tobacco: Never Used   Substance and Sexual Activity    Alcohol use:  Yes     Alcohol/week: 1.0 - 2.0 standard drinks     Types: 1 - 2 Shots of liquor per week     Comment: rare    Drug use: Yes     Types: Marijuana     Comment: socially, not often    Sexual activity: Yes     Partners: Female     Birth control/protection: None   Lifestyle    Physical activity:     Days per week: Not on file     Minutes per session: Not on file    Stress: Not on file   Relationships    Social connections:     Talks on phone: Not on file     Gets together: Not on file     Attends Sabianism service: Not on file     Active member of club or organization: Not on file     Attends meetings of clubs or organizations: Not on file     Relationship status: Not on file    Intimate partner violence:     Fear of current or ex partner: Not on file     Emotionally abused: Not on file     Physically abused: Not on file     Forced sexual activity: Not on file   Other Topics Concern    Not on file   Social History Narrative    Not on file         ALLERGIES: Bee sting [sting, bee]; Coconut oil; Mushroom; and Red dye    Review of Systems   Constitutional: Positive for fatigue. Negative for activity change, chills and fever. HENT: Negative for nosebleeds, sore throat, trouble swallowing and voice change. Eyes: Negative for visual disturbance. Respiratory: Negative for shortness of breath. Cardiovascular: Negative for chest pain and palpitations. Gastrointestinal: Positive for nausea. Negative for abdominal pain, constipation and diarrhea. Genitourinary: Negative for difficulty urinating, dysuria, hematuria and urgency. Musculoskeletal: Negative for back pain, neck pain and neck stiffness. Skin: Negative for color change. Allergic/Immunologic: Negative for immunocompromised state. Neurological: Positive for dizziness and headaches. Negative for seizures, syncope, weakness, light-headedness and numbness. Psychiatric/Behavioral: Negative for behavioral problems, confusion, hallucinations, self-injury and suicidal ideas. All other systems reviewed and are negative.       Vitals:    09/19/19 0801   Weight: 87.5 kg (193 lb)   Height: 5' 10\" (1.778 m)            Physical Exam Constitutional: He appears well-developed and well-nourished. No distress. HENT:   Head: Atraumatic. Eyes: Right eye exhibits nystagmus. Left eye exhibits nystagmus. Multidirectional nystagmus. Neck: No tracheal deviation present. Cardiovascular: Tachycardia present. Warm and well perfused   Pulmonary/Chest: Effort normal. No respiratory distress. Musculoskeletal: Normal range of motion. Neurological: He is alert. Coordination normal.   Skin: Skin is warm and dry. He is not diaphoretic. Psychiatric: He has a normal mood and affect. His behavior is normal. Judgment and thought content normal.   Nursing note and vitals reviewed. Note written by Susy Camacho. Rukhsana Ramriez, as dictated by Tatum Martinez MD 8:06 AM      MDM     This is a 44-year-old male with past medical history, review of systems, physical exam as above, presenting from his group home, for complaints of altered mental status. Per staff this morning, patient was noted to be altered, confused, sleepy, noting that he had medications changed by his psychiatrist yesterday. Per EMS, unclear which medications were changed or which medications were administered, medication list indicates quetiapine, valproic acid, olanzapine, Seroquel, Ativan. He is noted to be mildly tachycardic, with clear breath sounds, soft abdomen, afebrile, complaining of headache, nausea, without vomiting. He is noted to have multidirectional nystagmus bilaterally on exam.  Differential includes polypharmacy, substance abuse. Plan to administer IV fluids, obtain CMP, CBC, blood alcohol, acetaminophen, salicylates, UA, UDS. We will reevaluate, and make a disposition, however patient may require admission for further care and evaluation. Procedures  ED EKG interpretation:  Rhythm: normal sinus rhythm; and regular . Rate (approx.): 94; Axis: normal; ST/T wave: non-specific T wave changes. Note written by Susy Camacho.  Rukhsana Ramirez, as dictated by Courtney Brar MD 8:56 AM    9:05 AM  Nurse spoke with poison control due to elevated valproic acid level. 9:28 AM  Patient with elevated valproic acid level, likely 2/2 increased dosing, exacerbated by other psychoactive medications. Per Poison Control, recheck valproate levels q6h until wnl, fluids and benzos for tachycardia, seizures. Will consult Hospitalist for admission. Hospitalist Kenn for Admission  9:31 AM    ED Room Number: ER12/12  Patient Name and age:  King Wellington 25 y.o.  male  Working Diagnosis:   1. Valproic acid toxicity, accidental or unintentional, initial encounter    2. Altered mental status, unspecified altered mental status type    3. Polypharmacy      Readmission: no  Isolation Requirements:  no  Recommended Level of Care:  telemetry  Code Status:  Full Code  Department:[facility] Adult ED - 21   Other:  Patient discussed with Dr. Camacho Sanabria NOTE:  9:41 AM Courtney Brar MD communicated with Jayna Sun NP, Consult for Hospitalist via [facility] Text. Discussed available diagnostic tests and clinical findings.   Dr. Juan Jose Hameed will admit the patient to the hospital.

## 2019-09-20 LAB — VALPROATE SERPL-MCNC: 55 UG/ML (ref 50–100)

## 2019-09-20 PROCEDURE — 36415 COLL VENOUS BLD VENIPUNCTURE: CPT

## 2019-09-20 PROCEDURE — 80164 ASSAY DIPROPYLACETIC ACD TOT: CPT

## 2019-09-20 PROCEDURE — 65660000000 HC RM CCU STEPDOWN

## 2019-09-20 PROCEDURE — 93005 ELECTROCARDIOGRAM TRACING: CPT

## 2019-09-20 PROCEDURE — 74011250637 HC RX REV CODE- 250/637: Performed by: HOSPITALIST

## 2019-09-20 RX ORDER — SERTRALINE HYDROCHLORIDE 50 MG/1
50 TABLET, FILM COATED ORAL DAILY
Status: DISCONTINUED | OUTPATIENT
Start: 2019-09-21 | End: 2019-09-22 | Stop reason: HOSPADM

## 2019-09-20 RX ORDER — ATOMOXETINE 25 MG/1
100 CAPSULE ORAL DAILY
Status: DISCONTINUED | OUTPATIENT
Start: 2019-09-20 | End: 2019-09-22 | Stop reason: HOSPADM

## 2019-09-20 RX ORDER — OLANZAPINE 5 MG/1
10 TABLET ORAL
Status: DISCONTINUED | OUTPATIENT
Start: 2019-09-20 | End: 2019-09-22 | Stop reason: HOSPADM

## 2019-09-20 RX ORDER — QUETIAPINE FUMARATE 100 MG/1
400 TABLET, FILM COATED ORAL 3 TIMES DAILY
Status: DISCONTINUED | OUTPATIENT
Start: 2019-09-20 | End: 2019-09-22 | Stop reason: HOSPADM

## 2019-09-20 RX ORDER — IBUPROFEN 200 MG
1 TABLET ORAL EVERY 24 HOURS
Status: DISCONTINUED | OUTPATIENT
Start: 2019-09-20 | End: 2019-09-22 | Stop reason: HOSPADM

## 2019-09-20 RX ADMIN — QUETIAPINE FUMARATE 400 MG: 100 TABLET ORAL at 16:14

## 2019-09-20 RX ADMIN — OLANZAPINE 10 MG: 5 TABLET, FILM COATED ORAL at 22:06

## 2019-09-20 RX ADMIN — LORAZEPAM 0.5 MG: 0.5 TABLET ORAL at 09:04

## 2019-09-20 RX ADMIN — LORATADINE 10 MG: 10 TABLET ORAL at 09:04

## 2019-09-20 RX ADMIN — Medication 10 ML: at 13:46

## 2019-09-20 RX ADMIN — QUETIAPINE FUMARATE 400 MG: 100 TABLET ORAL at 22:06

## 2019-09-20 RX ADMIN — LORAZEPAM 0.5 MG: 0.5 TABLET ORAL at 17:01

## 2019-09-20 RX ADMIN — Medication 10 ML: at 09:04

## 2019-09-20 RX ADMIN — Medication 10 ML: at 22:06

## 2019-09-20 RX ADMIN — ATOMOXETINE 100 MG: 25 CAPSULE ORAL at 16:14

## 2019-09-20 NOTE — PROGRESS NOTES
Hospitalist Progress Note  Rober Layne MD  Answering service: [phone] OR [phone] from in house phone      Date of Service:  2019  NAME:  Meño Lyon                                                         :  1995                                               MRN:  [id number]    Admission summary   This is a 28-year-old white male with a past medical history of mental retardation, seizure disorder, ADHD, came to the emergency room for evaluation of dizziness, nausea, and lethargy and was admitted for valproic acid toxicity. Subjective/interval history    Patient is up and walking in the room and the hallway. No complaints    Assessment and plan  Valproic acid toxicity  -Depakote dose was recently increased to 500 mg tid(not sure what the previous dose was)  -VA level is now down to 55. I talked with Poison control who says he can now be released. Otehr psych meds can be continued and they suggested for prescribing psychiatrist to decide about the Depakote.  -I tried to reach out to his psych for suggestions about the Depakote dosing and also talked with the facility managed who tried to call the psych office. The psychiatrist is out of office today. Concern he may decompensate if he stays off the depakote,I have consulted inpatient psych for recommendations. History of seizure disorder. Depakote is on hold due to toxicity level. His Valium  continued. Schizophrenia. His antipsychotics resumed after discussing with Poison control. Tobacco use d/o: nicotine patch,counseled. Diet:regular  Code status:full  DVT prophylaxis:scd/ambulate. Disposition:Back to group home  Plan of care discussed [address] read,. Current facility administered and prior to admit medications reviewed. x         Review of Systems:  A comprehensive review of systems was negative except for that written in the HPI.         PHYSICAL EXAM:  O:  Visit Vitals  BP (!) 138/98 (BP 1 Location: Left arm, BP Patient Position: Sitting)   Pulse 89   Temp 98.3 °F (36.8 °C)   Resp 16   Ht 5' 10\" (1.778 m)   Wt 86.5 kg (190 lb 11.2 oz)   SpO2 99%   BMI 27.36 kg/m²     GENERAL:  Alert, oriented, cooperative, no apparent distress  HEENT:  Normocephalic, atraumatic, non icteric sclerae, non pallor conjuctivae, EOMs intact, PERRLA. NECK: Supple, trachea midline, no adenopathy, no thyromegally or tenderness, no carotid bruit and no JVD. LUNGS:   Vesicular breath sounds bilaterally, no added sounds. HEART:   S1 and S2 well heard,RRR,  no murmur, click, rub or gallop. ABDOMEB:   Soft, non-tender. Normoactive bowel sounds. No masses,  No organomegaly. EXTREMETIES:  Atraumatic, acyanotic, no edema  PULSES: 2+ and symmetric all extremities. SKIN:  No rashes or lesions  NEUROLOGY: Alert and oriented to PPT, CNII-XII intact. Motor and sensory exam grossly intact.        Recent labs & imaging reviewed:    Problem List as of 9/20/2019 Date Reviewed: 7/24/2019          Codes Class Noted - Resolved    * (Principal) Valproic acid toxicity ICD-10-CM: K97.1S8H  ICD-9-CM: 966.3, E980.4  9/19/2019 - Present        Schizophrenia ([facility] ICD-10-CM: F20.9  ICD-9-CM: 295.90  3/25/2019 - Present                Alexy Moy MD  Internal Medicine  Date of Service: 9/20/2019

## 2019-09-20 NOTE — PROGRESS NOTES
Problem: Altered Thought Process (Adult/Pediatric)  Goal: *STG: Participates in treatment plan  Outcome: Progressing Towards Goal  Goal: *STG: Seeks staff when feelings of anxiety and fear arise  Outcome: Progressing Towards Goal     Problem: Altered Thought Process (Adult/Pediatric)  Goal: *STG: Attends activities and groups  Outcome: Not Progressing Towards Goal  Goal: *STG: Decreased delusional thinking  Outcome: Not Progressing Towards Goal  Goal: *STG: Decreased hallucinations  Outcome: Not Progressing Towards Goal  Goal: *STG: Demonstrates ability to understand and use improved judgment in daily activities and relationships  Outcome: Not Progressing Towards Goal

## 2019-09-20 NOTE — PROGRESS NOTES
Bedside and Verbal shift change report given to Destiny Hoskins RN (oncoming nurse) by CHUY Allison (offgoing nurse). Report included the following information SBAR, Kardex, Intake/Output, MAR and Recent Results.

## 2019-09-20 NOTE — PROGRESS NOTES
Bedside and Verbal shift change report given to Houston Saleh (oncoming nurse) by Kezia Garcia (offgoing nurse). Report included the following information SBAR, Kardex, Intake/Output, MAR, Recent Results and Cardiac Rhythm NSR. Diana Mckinley

## 2019-09-20 NOTE — H&P
[address] [facility]  HISTORY AND PHYSICAL    Name:  Andres Lr  MR#:  [id number]  :  1995  ACCOUNT #:  [de-identified]  ADMIT DATE:  2019      CHIEF COMPLAINT:  Came from group home for dizziness, nausea, lethargy. HISTORY OF PRESENT ILLNESS:  This is a 45-year-old white male with a past medical history of mental retardation, seizure disorder, ADHD, came to the emergency room for evaluation of dizziness, nausea, and lethargy. The patient's psychotic and antiseizure medicines were recently adjusted reportedly. The patient with cognitive impairment. He is not a good historian and his mom who accompanied him does provide details of the patient. On evaluation in the emergency room, he was found to have elevated valproic acid level with the impression of valproic acid toxicity. Poison Control was consulted for admission evaluation. When I saw the patient, he was alert, oriented, not lethargic, he is clear. His mom is at the bedside. He denied headache, diplopia, nausea, vomiting, chest pain, or palpitations. He denied dizziness. His initial evaluation in the emergency room, temperature was 98.2, pulse rate 110, blood pressure 176/92, respirations 16, oxygen saturation 96% on room air. Basic lab work showed a WBC of 30.6 though and platelets of 970. Urine examination is negative for UTI. Serum chemistry is quite unremarkable. Therapeutic drug level, acetaminophen level, and salicylate level low and valproic acid is 135, cutoff is between . He did have a 12-lead EKG that showed normal sinus, MN interval 105, and QTc 430. CURRENT MEDICATIONS:  Include,  1. Zyprexa 10 mg nightly. 2.  Seroquel 400 mg 3 times daily at 7 a.m., 2 p.m., and 8 p.m. 3.  Zoloft 50 mg daily. 4.  Tylenol p.r.n.  5.  ClearLax 17 g.  6.  Albuterol inhaler p.r.n.  7.  Atomoxetine 100 mg daily. 8.  Depakote 500 mg 3 times daily. 9.  Lorazepam 0.5 mg b.i.d.   10.  Allegra 180 mg daily. ALLERGIES:  Allergies   Allergen Reactions    Bee Sting [Sting, Bee] Sneezing    Coconut Oil Hives    Mushroom Nausea and Vomiting    Red Dye Nausea and Vomiting     Has with fruit punch or Everlaw [facility]. SOCIAL HISTORY:  He smokes daily. Alcohol, occasionally. Marijuana, admitted to using marijuana. FAMILY HISTORY:  Negative for seizure disorder or mental disability. REVIEW OF SYSTEMS:  The patient gave a history of dizziness, nausea, headaches when he came in, currently comprehensive review of systems is negative. Denied palpitations, dizziness, diplopia, nausea, vomiting, or ataxia. PHYSICAL EXAMINATION:  GENERAL:  On examination, the patient is alert and oriented x3. He is not in any distress. VITAL SIGNS:  Most recent vital signs include temperature of 98.2, pulse rate of 94, blood pressure 113/60, respirations 18, oxygen saturation 96% on room air. HEENT:  Head is atraumatic. Pupils are equal and reactive. No pallor or jaundice. Buccal mucosa is moist.  NECK:  Supple. No lymphadenopathy. LUNGS:  He is on room air. No tachypnea. Vesicular air entry bilaterally. No added sounds. ABDOMEN:  Flat, soft, nontender. No organomegaly. EXTREMITIES:  No edema, deformity, or signs of injury. CENTRAL NERVOUS SYSTEM:  Nonfocal exam.    ASSESSMENT AND PLAN:  1. The patient presented with dizziness, fatigue, nausea, and lethargy, due likely to valproic acid toxicity. The patient will be admitted for monitoring and supportive therapy. Poison Control consulted and advised to hold Depakote, check Depakote level every 6 hours and they also advised to hold antipsychotic medications. The patient is intermittently tachycardic, however, he is not hypertensive. 2.  History of seizure disorder. Depakote is on hold due to toxicity level. His Valium will be continued. 3.  Schizophrenia.   His antipsychotics are currently on hold upon advice at [facility] due to valproic acid toxicity. We will continue Allegra and p.r.n. DuoNeb. 4.  Prophylaxis:  Gastrointestinal, no indication. Deep venous thrombosis, sequential compression devices. 5.  Advance care planning. I discussed advance care planning with the patient and his mother. He is full code and his mother is his surrogate decision maker. 6.  Baseline functional status:  Independent. 7.  Discharge disposition:  Group home. 8.  Anticipated length of stay:  1-2 days.         Ju Vasquez MD      [id number]  D:  09/19/2019 17:19  T:  09/19/2019 19:46  JOB #:  [id number]

## 2019-09-20 NOTE — PROGRESS NOTES
Spiritual Care Assessment/Progress Note  [facility]      NAME: Ramonita Manning      MRN: [id number]  AGE: 25 y.o.  SEX: male  Jew Affiliation: No preference   Language: English     9/20/2019     Total Time (in minutes): 30     Sp[facility] through conversation with:         [x]Patient        [] Family    [] Friend(s)              Spiritual beliefs: (Please include comment if needed)     [] Identifies with a oliver tradition:         [] Supported by a oliver community:            [x] Claims no spiritual orientation:           [] Seeking spiritual identity:                [] Adheres to an individual form of spirituality:           [] Not able to assess:                           Identified resources for coping:      [] Prayer                               [x] Music                  [] Guided Imagery     [x] Family/friends                 [] Pet visits     [] Devotional reading                         [] Unknown     [] Other:                                               Interventions offered during this visit: (See comments for more details)    Patient Interventions: Affirmation of emotions/emotional suffering, Coping skills reviewed/reinforced, Initial/Spiritual assessment, patient floor, Prayer (assurance of), Jew beliefs/image of God discussed           Plan of Care:     [] Support spiritual and/or cultural needs    [] Support AMD and/or advance care planning process      [] Support grieving process   [] Coordinate Rites and/or Rituals    [] Coordination with community clergy   [x] No spiritual needs identified at this time   [] Detailed Plan of Care below (See Comments)  [] Make referral to Music Therapy  [] Make referral to Pet Therapy     [] Make referral to Addiction services  [] Make referral to [facility]  [] Make referral to Spiritual Care Partner  [] No future visits requested        [x] Follow up visits as needed     Comments:  visited pt in room 441. Pt's  in the room at time of visit. Pt was on the phone with his mother at time of visit.  waited outside of room until phon conversation was over.  listened as pt shared events of the morning. Pt is looking forward to becoming stable enough to  be discharged.  assured pt of continued support.  will follow up as needed.     Chaplain Amanda Intern, MDiv  [phone]

## 2019-09-20 NOTE — PROGRESS NOTES
1610: Pt complaining of chest pain, pt states, \"I get this pain because I need to smoke. \" Dr. Karen Townsend notified, orders received, VSS, medications given. Will continue to monitor. 1646: Pt states chest pain has improved. Pt states he has had several dark red liquid stools over the last few days. Pt notified to let staff see stool next time he has a BM. Dr. Karen Townsend notified, orders received, will continue to monitor. EKG performed, nicotine patch applied. Bedside shift change report given to [address] [place] [address] (oncoming nurse) by Sammi Brady (offgoing nurse). Report included the following information SBAR, Kardex, ED Summary, Procedure Summary, Intake/Output, MAR, Recent Results, Med Rec Status, Cardiac Rhythm NSR, Alarm Parameters  and Quality Measures.

## 2019-09-20 NOTE — PROGRESS NOTES
Bedside and Verbal shift change report given to Alana Lomeli (oncoming nurse) by Zane Nelson (offgoing nurse). Report included the following information SBAR, Accordion and Cardiac Rhythm NSR.

## 2019-09-21 LAB
ATRIAL RATE: 81 BPM
BASOPHILS # BLD: 0 K/UL (ref 0–0.1)
BASOPHILS NFR BLD: 0 % (ref 0–1)
CALCULATED P AXIS, ECG09: 56 DEGREES
CALCULATED R AXIS, ECG10: 54 DEGREES
CALCULATED T AXIS, ECG11: 86 DEGREES
DIAGNOSIS, 93000: NORMAL
DIFFERENTIAL METHOD BLD: ABNORMAL
EOSINOPHIL # BLD: 0.1 K/UL (ref 0–0.4)
EOSINOPHIL NFR BLD: 2 % (ref 0–7)
ERYTHROCYTE [DISTWIDTH] IN BLOOD BY AUTOMATED COUNT: 12.3 % (ref 11.5–14.5)
HCT VFR BLD AUTO: 45.1 % (ref 36.6–50.3)
HGB BLD-MCNC: 15.6 G/DL (ref 12.1–17)
IMM GRANULOCYTES # BLD AUTO: 0 K/UL (ref 0–0.04)
IMM GRANULOCYTES NFR BLD AUTO: 0 % (ref 0–0.5)
LYMPHOCYTES # BLD: 1.4 K/UL (ref 0.8–3.5)
LYMPHOCYTES NFR BLD: 36 % (ref 12–49)
MCH RBC QN AUTO: 31.9 PG (ref 26–34)
MCHC RBC AUTO-ENTMCNC: 34.6 G/DL (ref 30–36.5)
MCV RBC AUTO: 92.2 FL (ref 80–99)
MONOCYTES # BLD: 1 K/UL (ref 0–1)
MONOCYTES NFR BLD: 27 % (ref 5–13)
NEUTS SEG # BLD: 1.3 K/UL (ref 1.8–8)
NEUTS SEG NFR BLD: 35 % (ref 32–75)
NRBC # BLD: 0 K/UL (ref 0–0.01)
NRBC BLD-RTO: 0 PER 100 WBC
P-R INTERVAL, ECG05: 118 MS
PLATELET # BLD AUTO: 98 K/UL (ref 150–400)
PMV BLD AUTO: 11.2 FL (ref 8.9–12.9)
Q-T INTERVAL, ECG07: 352 MS
QRS DURATION, ECG06: 96 MS
QTC CALCULATION (BEZET), ECG08: 408 MS
RBC # BLD AUTO: 4.89 M/UL (ref 4.1–5.7)
RBC MORPH BLD: ABNORMAL
TSH SERPL DL<=0.05 MIU/L-ACNC: 1.28 UIU/ML (ref 0.36–3.74)
VALPROATE SERPL-MCNC: 20 UG/ML (ref 50–100)
VENTRICULAR RATE, ECG03: 81 BPM
WBC # BLD AUTO: 3.8 K/UL (ref 4.1–11.1)

## 2019-09-21 PROCEDURE — 65660000000 HC RM CCU STEPDOWN

## 2019-09-21 PROCEDURE — 74011250637 HC RX REV CODE- 250/637: Performed by: HOSPITALIST

## 2019-09-21 PROCEDURE — 74011250636 HC RX REV CODE- 250/636: Performed by: HOSPITALIST

## 2019-09-21 PROCEDURE — 80164 ASSAY DIPROPYLACETIC ACD TOT: CPT

## 2019-09-21 PROCEDURE — 85025 COMPLETE CBC W/AUTO DIFF WBC: CPT

## 2019-09-21 PROCEDURE — 36415 COLL VENOUS BLD VENIPUNCTURE: CPT

## 2019-09-21 PROCEDURE — 84443 ASSAY THYROID STIM HORMONE: CPT

## 2019-09-21 PROCEDURE — 93005 ELECTROCARDIOGRAM TRACING: CPT

## 2019-09-21 RX ORDER — SODIUM CHLORIDE 9 MG/ML
125 INJECTION, SOLUTION INTRAVENOUS CONTINUOUS
Status: DISCONTINUED | OUTPATIENT
Start: 2019-09-21 | End: 2019-09-22 | Stop reason: HOSPADM

## 2019-09-21 RX ADMIN — QUETIAPINE FUMARATE 400 MG: 100 TABLET ORAL at 15:40

## 2019-09-21 RX ADMIN — LORATADINE 10 MG: 10 TABLET ORAL at 08:53

## 2019-09-21 RX ADMIN — SODIUM CHLORIDE 125 ML/HR: 900 INJECTION, SOLUTION INTRAVENOUS at 15:41

## 2019-09-21 RX ADMIN — LORAZEPAM 0.5 MG: 0.5 TABLET ORAL at 18:12

## 2019-09-21 RX ADMIN — SODIUM CHLORIDE 125 ML/HR: 900 INJECTION, SOLUTION INTRAVENOUS at 23:16

## 2019-09-21 RX ADMIN — QUETIAPINE FUMARATE 400 MG: 100 TABLET ORAL at 21:22

## 2019-09-21 RX ADMIN — QUETIAPINE FUMARATE 400 MG: 100 TABLET ORAL at 08:53

## 2019-09-21 RX ADMIN — LORAZEPAM 0.5 MG: 0.5 TABLET ORAL at 08:53

## 2019-09-21 RX ADMIN — Medication 10 ML: at 21:25

## 2019-09-21 RX ADMIN — SERTRALINE HYDROCHLORIDE 50 MG: 50 TABLET ORAL at 08:53

## 2019-09-21 RX ADMIN — OLANZAPINE 10 MG: 5 TABLET, FILM COATED ORAL at 21:22

## 2019-09-21 RX ADMIN — ATOMOXETINE 100 MG: 25 CAPSULE ORAL at 10:41

## 2019-09-21 NOTE — PROGRESS NOTES
Bedside shift change report given to Valeria (oncoming nurse) by Shiva rondon (offgoing nurse).  Report included the following information SBAR, Intake/Output, MAR, Recent Results and Cardiac Rhythm SR.

## 2019-09-21 NOTE — PROGRESS NOTES
Transition of Care Plan    1. Return to [address] [place] [address] Road  -owner Joss Delgadillo [phone] and  Jack Perry [phone] to be called when patient is ready for discharge        Reason for Admission:   Dizziness, nausea, lethargy (valproic acid toxicity)   Medical hx-- mental retardation, seizure disorder, asthma, schizophrenia and ADHD                   RRAT Score:         9            Plan for utilizing home health:      Not indicated at this time                    Current Advanced Directive/Advance Care Plan: not on chart                         Transition of Care Plan:       Return home to group home and medical follow up      CM met with patient in his room to introduce self and explain role. He was alert and oriented. Confirmed living address of [employer] [phone],  Insurance [facility] Medicaid, and PCP Dr Lindsay Bach --last appointment 7/24/19    Patient lives in a group home owned by [employer] is Jack Perry--  [phone]    Patient said he likes the living arrangements. He goes to day support  the day. Patient said his mother, Driss Anderson lives in [place] and he talks to her regularly. She does not have a car so it is difficult for her to visit patient. .     Cm talked to  [address] [place] [address]. he confirmed that he should be called when patient is ready for discharge and either he or  a staff member will come to the hospital to transport him back to the group home. Care Management Interventions  PCP Verified by CM: Yes  Last Visit to PCP: 07/24/19  Mode of Transport at Discharge: (car)  Transition of Care Consult (CM Consult): Discharge Planning  Discharge Durable Medical Equipment: No  Physical Therapy Consult: No  Occupational Therapy Consult: No  Speech Therapy Consult: No  Current Support Network:  Adult Group Home(independent and self care in group home.. good support from staff members .   No AMD in chart)  Confirm Follow Up Transport: Other (see comment)(staff at the [address] [place] [address])  Plan discussed with Pt/Family/Caregiver: Yes(left message for )  Freedom of Choice Offered: Yes  Discharge Location  Discharge Placement: Home

## 2019-09-21 NOTE — PROGRESS NOTES
900:     Pt HR increased to 150bpm. Pt was ambulating at the time. Instructed pt to rest since he had been ambulating all morning. HR decreased to 110-120s. Will continue to monitor. 922:    Dr. Heena Cano made aware of HR remaining in the 120s. Order for EKG placed.

## 2019-09-21 NOTE — PROGRESS NOTES
Spiritual Care Partner Volunteer visited patient in Rm 441 on 9/21/19. Documented by:   Chaplain Bennett MDiv, MACE  [phone]

## 2019-09-22 VITALS
HEIGHT: 70 IN | HEART RATE: 101 BPM | WEIGHT: 190.7 LBS | TEMPERATURE: 98.1 F | SYSTOLIC BLOOD PRESSURE: 145 MMHG | DIASTOLIC BLOOD PRESSURE: 101 MMHG | OXYGEN SATURATION: 99 % | RESPIRATION RATE: 19 BRPM | BODY MASS INDEX: 27.3 KG/M2

## 2019-09-22 PROBLEM — T42.6X1A VALPROIC ACID TOXICITY: Status: RESOLVED | Noted: 2019-09-19 | Resolved: 2019-09-22

## 2019-09-22 LAB
ATRIAL RATE: 99 BPM
CALCULATED P AXIS, ECG09: 64 DEGREES
CALCULATED R AXIS, ECG10: 56 DEGREES
CALCULATED T AXIS, ECG11: 106 DEGREES
DIAGNOSIS, 93000: NORMAL
P-R INTERVAL, ECG05: 122 MS
Q-T INTERVAL, ECG07: 328 MS
QRS DURATION, ECG06: 98 MS
QTC CALCULATION (BEZET), ECG08: 420 MS
VENTRICULAR RATE, ECG03: 99 BPM

## 2019-09-22 PROCEDURE — 74011250637 HC RX REV CODE- 250/637: Performed by: HOSPITALIST

## 2019-09-22 RX ADMIN — SERTRALINE HYDROCHLORIDE 50 MG: 50 TABLET ORAL at 10:18

## 2019-09-22 RX ADMIN — LORAZEPAM 0.5 MG: 0.5 TABLET ORAL at 10:18

## 2019-09-22 RX ADMIN — ATOMOXETINE 100 MG: 25 CAPSULE ORAL at 10:18

## 2019-09-22 RX ADMIN — QUETIAPINE FUMARATE 400 MG: 100 TABLET ORAL at 10:18

## 2019-09-22 RX ADMIN — LORATADINE 10 MG: 10 TABLET ORAL at 10:18

## 2019-09-22 NOTE — DISCHARGE INSTRUCTIONS
Discharge Instructions       PATIENT ID: Gay Ellison  MRN: [id number]   YOB: 1995    DATE OF ADMISSION: 9/19/2019  7:58 AM    DATE OF DISCHARGE: 9/22/2019    PRIMARY CARE PROVIDER: Sylvie Javier MD       DISCHARGING PHYSICIAN: Marcellus Santiago MD    To contact this individual call [phone] and ask the  to page. If unavailable ask to be transferred the Adult Hospitalist Department. DISCHARGE DIAGNOSES; Valproic acid toxicity- resolved, Schizophrenia, History of seizure disorder    CONSULTATIONS: IP CONSULT TO HOSPITALIST  IP CONSULT TO PSYCHIATRY    PROCEDURES/SURGERIES: * No surgery found *    PENDING TEST RESULTS:   At the time of discharge the following test results are still pending:     FOLLOW UP APPOINTMENTS:   Follow-up Information     Follow up With Specialties Details Why Contact Info    Sylvie Javier MD Infectious Diseases   [phone]  [phone]             ADDITIONAL CARE RECOMMENDATIONS:     DIET: Regular Diet    ACTIVITY: Activity as tolerated    WOUND CARE:     EQUIPMENT needed:       DISCHARGE MEDICATIONS:   See Medication Reconciliation Form    · It is important that you take the medication exactly as they are prescribed. · Keep your medication in the bottles provided by the pharmacist and keep a list of the medication names, dosages, and times to be taken in your wallet. · Do not take other medications without consulting your doctor. NOTIFY YOUR PHYSICIAN FOR ANY OF THE FOLLOWING:   Fever over 101 degrees for 24 hours. Chest pain, shortness of breath, fever, chills, nausea, vomiting, diarrhea, change in mentation, falling, weakness, bleeding. Severe pain or pain not relieved by medications. Or, any other signs or symptoms that you may have questions about.       DISPOSITION:  x  Home With:   OT  PT  HH  RN       SNF/Inpatient Rehab/LTAC    Independent/assisted living    Hospice    Other: CDMP Checked:   Yes x       Signed:   Shawn Tiraod MD  9/22/2019  11:16 AM

## 2019-09-22 NOTE — PROGRESS NOTES
Problem: Altered Thought Process (Adult/Pediatric)  Goal: *STG: Participates in treatment plan  Outcome: Progressing Towards Goal  Goal: *STG: Remains safe in hospital  Outcome: Progressing Towards Goal  Goal: *STG: Seeks staff when feelings of anxiety and fear arise  Outcome: Progressing Towards Goal  Goal: *STG: Complies with medication therapy  Outcome: Progressing Towards Goal     Problem: Patient Education: Go to Patient Education Activity  Goal: Patient/Family Education  Outcome: Progressing Towards Goal

## 2019-09-22 NOTE — PROGRESS NOTES
Hospitalist Progress Note  Tarsha De León MD  Answering service: [phone] OR [phone] from in house phone      Date of Service:  2019  NAME:  Shahnaz Burleson                                                         :  1995                                               MRN:  [id number]    Admission summary   This is a 77-year-old white male with a past medical history of mental retardation, seizure disorder, ADHD, came to the emergency room for evaluation of dizziness, nausea, and lethargy and was admitted for valproic acid toxicity. Subjective/interval history    Patient denied any chest pain,nausea/vomiting. On tele,tachycarida upto HR 130s today    Assessment and plan  Valproic acid toxicity  -Depakote dose was recently increased to 500 mg tid(not sure what the previous dose was)  -VA level is now down to 55. Per  Poison control who says he can now be released. Otehr psych meds can be continued and they suggested for prescribing psychiatrist to decide about the Depakote. -Previous hospitalist tried to reach out to his psych for suggestions about the Depakote dosing and also talked with the facility managed who tried to call the psych office. The psychiatrist is out of office  -Psych consulted for further eval and medication adjustments    Sinus tachycardia:  -HR upto 120-130 s on tele  -EKG showed sinus tachycardia  -start IVF. -CHeck CBC and TSH.  -could be related to medications      History of seizure disorder. Depakote is on hold due to toxicity level. His Valium  continued. Schizophrenia. His antipsychotics resumed after discussing with Poison control. Tobacco use d/o: nicotine patch,counseled. Diet:regular  Code status:full  DVT prophylaxis:scd/ambulate. Disposition:Back to group home when stable         Current facility administered and prior to admit medications reviewed.   x         Review of Systems:  A comprehensive review of systems was negative except for that written in the HPI. PHYSICAL EXAM:  O:  Visit Vitals  /84 (BP 1 Location: Left arm, BP Patient Position: At rest)   Pulse 96   Temp 97.6 °F (36.4 °C)   Resp 16   Ht 5' 10\" (1.778 m)   Wt 86.5 kg (190 lb 11.2 oz)   SpO2 97%   BMI 27.36 kg/m²     GENERAL:  Alert, oriented, cooperative, no apparent distress  HEENT:  Normocephalic, atraumatic, non icteric sclerae, non pallor conjuctivae, EOMs intact  NECK: Supple, trachea midline, no adenopathy  Resp- Vesicular breath sounds bilaterally, no added sounds. HEART:   S1 and S2 well heard,tachycardia  ABDOMEB:   Soft, non-tender. Normoactive bowel sounds. No masses  EXTREMETIES:  Atraumatic, acyanotic, no edema  PULSES: 2+ and symmetric all extremities. SKIN:  No rashes or lesions  NEUROLOGY: Alert and oriented to PPT, . Motor and sensory exam grossly intact.        Recent labs & imaging reviewed:    Problem List as of 9/22/2019 Date Reviewed: 7/24/2019          Codes Class Noted - Resolved    * (Principal) Valproic acid toxicity ICD-10-CM: P47.8N2B  ICD-9-CM: 966.3, E980.4  9/19/2019 - Present        Schizophrenia ([facility] ICD-10-CM: F20.9  ICD-9-CM: 295.90  3/25/2019 - Present                Ladonna Griffin MD  Internal Medicine  Date of Service: 9/22/2019

## 2019-09-22 NOTE — DISCHARGE SUMMARY
Discharge Summary       PATIENT ID: King Wellington  MRN: [id number]   YOB: 1995    DATE OF ADMISSION: 9/19/2019  7:58 AM    DATE OF DISCHARGE: 9/22/2019  PRIMARY CARE PROVIDER: Emilie Charles MD     DISCHARGING PHYSICIAN: Brad Gerber MD    To contact this individual call [phone] and ask the  to page. If unavailable ask to be transferred the Adult Hospitalist Department. CONSULTATIONS: IP CONSULT TO HOSPITALIST  IP CONSULT TO PSYCHIATRY    PROCEDURES/SURGERIES: * No surgery found *    ADMITTING [address] [place] [address] COURSE:     This is a 70-year-old white male with a past medical history of mental retardation, seizure disorder, ADHD, came to the emergency room for evaluation of dizziness, nausea, and lethargy and was admitted for valproic acid toxicity    DISCHARGE DIAGNOSES / PLAN:      Valproic acid toxicity  -Depakote dose was recently increased to 500 mg tid(not sure what the previous dose was)  - Poison control called and cleared for discharge.  -Psych consulted. Recommend not re starting Depakote and patient should follow up with his personal psychiatrist on discharge. Sinus tachycardia:  -EKG showed sinus tachycardia. TSH wnl  - resolved     History of seizure disorder.  Depakote is on hold due to toxicity level. Continue ativan. Follow up with neurology on an out-patient basis.     Schizophrenia. Continue seroquel. Tobacco use; Counseled.     Disposition prior to discharge: The patient is hemodynamically stable. D/c to group home.         PENDING TEST RESULTS:   At the time of discharge the following test results are still pending:     FOLLOW UP APPOINTMENTS:    Follow-up Information     Follow up With Specialties Details Why Contact Info    Emilie Charles MD Infectious Diseases   [phone]  [phone]             ADDITIONAL CARE RECOMMENDATIONS:     DIET: Regular Diet    ACTIVITY: Activity as tolerated    WOUND CARE:     EQUIPMENT needed:       DISCHARGE MEDICATIONS:  Current Discharge Medication List      CONTINUE these medications which have NOT CHANGED    Details   LORazepam (ATIVAN) 0.5 mg tablet Take 0.5 mg by mouth two (2) times a day. OLANZapine (ZYPREXA) 10 mg tablet Take 10 mg by mouth nightly. QUEtiapine (SEROQUEL) 400 mg tablet Take 400 mg by mouth three (3) times daily. 7am, 2pm, 8pm      sertraline (ZOLOFT) 50 mg tablet Take 50 mg by mouth daily. acetaminophen (TYLENOL) 325 mg tablet Take 650 mg by mouth every six (6) hours as needed for Pain or Fever. CLEARLAX 17 gram/dose powder MIX 1 CAPFUL (SEE 17 gm LINE) IN A GLASS OF WATER & DRINK ONCE DAILY FOR CONSTIPATION. Qty: 510 g, Refills: 11      fexofenadine (ALLEGRA) 180 mg tablet Take 1 Tab by mouth daily. Qty: 30 Tab, Refills: 5    Associated Diagnoses: Allergic rhinitis due to other allergic trigger, unspecified seasonality      albuterol (PROVENTIL HFA, VENTOLIN HFA, PROAIR HFA) 90 mcg/actuation inhaler Take 2 Puffs by inhalation every four (4) hours as needed for Wheezing. Qty: 1 Inhaler, Refills: 0    Associated Diagnoses: Viral upper respiratory illness; Cough      atomoxetine (STRATTERA) 100 mg capsule Take 100 mg by mouth daily. NOTIFY YOUR PHYSICIAN FOR ANY OF THE FOLLOWING:   Fever over 101 degrees for 24 hours. Chest pain, shortness of breath, fever, chills, nausea, vomiting, diarrhea, change in mentation, falling, weakness, bleeding. Severe pain or pain not relieved by medications. Or, any other signs or symptoms that you may have questions about.     DISPOSITION:  x  Home With:   OT  PT  HH  RN       Long term SNF/Inpatient Rehab    Independent/assisted living    Hospice    Other:       PATIENT CONDITION AT DISCHARGE:     Functional status    Poor     Deconditioned    x Independent      Cognition    x Lucid     Forgetful     Dementia      Catheters/lines (plus indication)    [place]     PICC PEG    x None      Code status    x Full code     DNR      PHYSICAL EXAMINATION AT DISCHARGE:   Refer to Progress Note    GENERAL: Alert, oriented, cooperative, no apparent distress  HEENT: Normocephalic, atraumatic, non icteric sclerae, non pallor conjuctivae, EOMs intact  NECK: Supple, trachea midline, no adenopathy  Resp-   Vesicular breath sounds bilaterally, no added sounds. HEART: S1 and S2 well heard,tachycardia  ABDOMEB: Soft, non-tender. Normoactive bowel sounds. No masses  EXTREMETIES: Atraumatic, acyanotic, no edema  PULSES:  2+ and symmetric all extremities. SKIN: No rashes or lesions  NEUROLOGY: Alert and oriented.  No focal deficits        CHRONIC MEDICAL DIAGNOSES:  Problem List as of 9/22/2019 Date Reviewed: 7/24/2019          Codes Class Noted - Resolved    * (Principal) Valproic acid toxicity ICD-10-CM: O60.5T1L  ICD-9-CM: 966.3, E980.4  9/19/2019 - Present        [facility] ([facility] ICD-10-CM: F20.9  ICD-9-CM: 295.90  3/25/2019 - Present              Greater than 30 minutes were spent with the patient on counseling and coordination of care    Signed:   Zohreh Crowe MD  9/22/2019  11:05 AM

## 2019-09-22 NOTE — CONSULTS
[address]    Name:  Briana Howell  MR#:  [id number]  :  1995  ACCOUNT #:  [de-identified]  DATE OF SERVICE:  2019    PSYCHIATRIC CONSULTATION    CHIEF COMPLAINT:  \"I feel fine and I want to go home. \"    HISTORY OF PRESENT ILLNESS:  The patient is a 77-year-old  man who is currently admitted to the medical floor at [facility].  He was brought to the ER from his group home with a history of having dizziness, nausea and lethargy. On admission, his Depakote level was found to be 135. Poison Control was contacted and they recommended stopping the Depakote. His most recent Depakote level was 20. He reports that he feels much improved today physically, and he describes his head as being clear. He denies any suicidal ideation or plan. Denies depressed mood or neurovegetative symptoms of depression. He is requesting to be discharged home today. According to the RN taking care of him, he has been calm, pleasant and cooperative and has not had any behavioral symptoms. Denies use of alcohol or drugs. Denies any perceptual abnormalities. Past Medical History:   Diagnosis Date    Asthma     Seizures ([facility] [address]     last seizure       Prior to Admission medications    Medication Sig Start Date End Date Taking? Authorizing Provider   LORazepam (ATIVAN) 0.5 mg tablet Take 0.5 mg by mouth two (2) times a day. Yes Provider, Historical   OLANZapine (ZYPREXA) 10 mg tablet Take 10 mg by mouth nightly. Yes Provider, Historical   QUEtiapine (SEROQUEL) 400 mg tablet Take 400 mg by mouth three (3) times daily. 7am, 2pm, 8pm   Yes Provider, Historical   sertraline (ZOLOFT) 50 mg tablet Take 50 mg by mouth daily. Yes Provider, Historical   acetaminophen (TYLENOL) 325 mg tablet Take 650 mg by mouth every six (6) hours as needed for Pain or Fever.    Yes Provider, Historical   CLEARLAX 17 gram/dose powder MIX 1 CAPFUL (SEE 17 gm LINE) IN A GLASS OF WATER & DRINK ONCE DAILY FOR CONSTIPATION. 8/9/19  Yes Aileen Han MD   fexofenadine (ALLEGRA) 180 mg tablet Take 1 Tab by mouth daily. 7/24/19  Yes Aileen Han MD   albuterol (PROVENTIL HFA, VENTOLIN HFA, PROAIR HFA) 90 mcg/actuation inhaler Take 2 Puffs by inhalation every four (4) hours as needed for Wheezing. 5/14/19  Yes Dennis Brooks NP   atomoxetine (STRATTERA) 100 mg capsule Take 100 mg by mouth daily. Yes Provider, Historical   divalproex DR (DEPAKOTE) 500 mg tablet Take  by mouth three (3) times daily. Yes Provider, Historical      Lab Results   Component Value Date/Time    WBC 3.8 (L) 09/21/2019 05:02 PM    HGB 15.6 09/21/2019 05:02 PM    HCT 45.1 09/21/2019 05:02 PM    PLATELET 98 (L) 16/36/2405 05:02 PM    MCV 92.2 09/21/2019 05:02 PM     Lab Results   Component Value Date/Time    Sodium 140 09/19/2019 08:08 AM    Potassium 3.8 09/19/2019 08:08 AM    Chloride 105 09/19/2019 08:08 AM    CO2 31 09/19/2019 08:08 AM    Anion gap 4 (L) 09/19/2019 08:08 AM    Glucose 93 09/19/2019 08:08 AM    BUN 8 09/19/2019 08:08 AM    Creatinine 0.91 09/19/2019 08:08 AM    BUN/Creatinine ratio 9 (L) 09/19/2019 08:08 AM    GFR est AA >60 09/19/2019 08:08 AM    GFR est non-AA >60 09/19/2019 08:08 AM    Calcium 9.6 09/19/2019 08:08 AM    Bilirubin, total 0.4 09/19/2019 08:08 AM    AST (SGOT) 19 09/19/2019 08:08 AM    Alk.  phosphatase 72 09/19/2019 08:08 AM    Protein, total 8.2 09/19/2019 08:08 AM    Albumin 4.0 09/19/2019 08:08 AM    Globulin 4.2 (H) 09/19/2019 08:08 AM    A-G Ratio 1.0 (L) 09/19/2019 08:08 AM    ALT (SGPT) 41 09/19/2019 08:08 AM     Vitals:    09/21/19 2011 09/22/19 0044 09/22/19 0449 09/22/19 0834   BP: 132/84 112/72 111/78 (!) 139/101   Pulse: 96 94 92 86   Resp: 16 18 19 19   Temp: 97.6 °F (36.4 °C) 98.1 °F (36.7 °C) 97.6 °F (36.4 °C) 97.7 °F (36.5 °C)   SpO2: 97% 99% 98% 99%   Weight:       Height:            PAST PSYCHIATRIC HISTORY:  The patient carries a longstanding diagnosis of schizoaffective disorder and has been hospitalized numerous times. He does not remember when he was last hospitalized. There is a history of intellectual disability, and he struggles with some of the details. Currently, he sees a psychiatrist, but could not recall the name of the psychiatrist.    PAST PSYCHOSOCIAL HISTORY:  The patient was born in [place], but has been living in [place] for the last two to three years. States that his siblings live in [place] and check on him. He lives in a group home in [facility] now. States that he did not go beyond VIII grade and was in special education classes. He gets social security disability income and says he is single currently. Denies any major legal or financial stressors. MENTAL STATUS EXAMINATION:  The patient is a young  male who is dressed in hospital apparel. He is calm, pleasant and cooperative. Speech is spontaneous and coherent. Affect is reactive and mood is reported as being good. Denies any suicidal ideation or plan. Denies any perceptual abnormalities. Denies any delusions. His thought process is logical and goal directed. Cognitively, he is awake and alert, oriented to place, person and time. Intelligence is below average. Memory is adequate and fund of knowledge is adequate. Insight is partial.  Judgment is fair. ASSESSMENT AND PLAN/DIAGNOSIS:  Schizoaffective disorder, bipolar type. At the present time, the patient appears to be stable and has recovered from the episode of valproate toxicity. At the present time, I would not recommend restarting it. He is supposed to see his psychiatrist in a few days and either Depakote or an alternative medication can be restarted at that time. He is on enough medications currently to ensure he does not relapse very quickly. Once he is cleared medically, he can be discharged home and does not pose a risk to himself or the others. Thank you for your consult.       IGNACIA MD TIO [email or web address]  D:  09/21/2019 11:07  T:  09/21/2019 11:18  JOB #:  [id number]

## 2019-09-22 NOTE — PROGRESS NOTES
Bedside and Verbal shift change report given to Yuri Robertson   (oncoming nurse) by Eugenio Flood (offgoing nurse). Report included the following information SBAR, Kardex, Intake/Output, Recent Results and Cardiac Rhythm Sinus Tach.

## 2019-09-22 NOTE — PROGRESS NOTES
Bedside shift change report given to [address] (oncoming nurse) by Akin Michaud (offgoing nurse). Report included the following information SBAR, Kardex, Intake/Output, MAR and Cardiac Rhythm NSR.

## 2019-09-23 LAB — VALPROATE FREE SERPL-MCNC: 22.3 UG/ML (ref 6–22)

## 2019-09-25 ENCOUNTER — OFFICE VISIT (OUTPATIENT)
Dept: INTERNAL MEDICINE CLINIC | Age: 24
End: 2019-09-25

## 2019-09-25 VITALS
TEMPERATURE: 97.6 F | SYSTOLIC BLOOD PRESSURE: 130 MMHG | RESPIRATION RATE: 14 BRPM | BODY MASS INDEX: 27.67 KG/M2 | WEIGHT: 193.25 LBS | HEART RATE: 102 BPM | HEIGHT: 70 IN | OXYGEN SATURATION: 95 % | DIASTOLIC BLOOD PRESSURE: 88 MMHG

## 2019-09-25 DIAGNOSIS — G40.909 SEIZURE DISORDER (HCC): Primary | ICD-10-CM

## 2019-09-25 DIAGNOSIS — F20.9 SCHIZOPHRENIA, UNSPECIFIED TYPE (HCC): ICD-10-CM

## 2019-09-25 DIAGNOSIS — T42.6X5A VALPROIC ACID CAUSING ADVERSE EFFECT IN THERAPEUTIC USE, INITIAL ENCOUNTER: ICD-10-CM

## 2019-09-25 DIAGNOSIS — Z09 HOSPITAL DISCHARGE FOLLOW-UP: ICD-10-CM

## 2019-09-25 NOTE — PROGRESS NOTES
History of Present Illness:   Aydee Goddard is a 25 y.o. male here for evaluation:    Chief Complaint   Patient presents with    Medication Reaction     Valproic acid toxicity     He has copy of orders from Dr. Heath Breen (psychiatrist) from 9-16 with changes as below:  --Quietapine 300mg 1 tab BID changed to Quietapine 400mg 1 tab BID. --Quietapine 200mg daily at 2PM (+432iz=087o) changed to Quietapine 300mg 1 tablet daily at 2PM (+564bg=455kt)  Her script indicated 400mg TID    She also wrote for olanzapine 10mg HS #30 with 2 refills on 9-16-19. Straterra 100mg daily in AM--#30 with 2 refills. Ativan 0.5mg 1 PO BID--#60 with 2 refills. Reviewed with patient that he has follow-up with neurology already scheduled. Reviewed that he should discuss alternative seizure medication with neurology and follow-up as scheduled. Patient voiced understanding the plan. He initially seemed somewhat confused about needing neurologist to manage his seizure medication. He is no current problems or questions about medicines. Notes no interim seizure activity since hospital discharge. He has ongoing management of his mental health diagnoses with psychiatrist above. Labs reviewed at visit:    Valproic acid level normalized during admit. Normal baseline LFT's--not repeated prior to discharge. WBC/ANC stable from admit to discharge. Slight decrease platelets during admission. Component      Latest Ref Rng & Units 9/21/2019 9/20/2019 9/19/2019 9/19/2019           4:11 AM  4:17 AM  3:17 PM  8:08 AM   Valproic acid      50 - 100 ug/ml 20 (L) 55 103 (H) 135 (H)       Component      Latest Ref Rng & Units 9/19/2019           8:08 AM   ALT (SGPT)      12 - 78 U/L 41   AST      15 - 37 U/L 19   Alk.  phosphatase      45 - 117 U/L 72     Component      Latest Ref Rng & Units 9/21/2019 9/19/2019 9/19/2019           5:02 PM  8:08 AM  8:08 AM   WBC      4.1 - 11.1 K/uL 3.8 (L)  3.6 (L)   RBC      4.10 - 5.70 M/uL 4.89 4.81   HGB      12.1 - 17.0 g/dL 15.6  15.0       Component      Latest Ref Rng & Units 9/21/2019 9/19/2019 9/19/2019           5:02 PM  8:08 AM  8:08 AM   PLATELET      702 - 656 K/uL 98 (L)  110 (L)     Component      Latest Ref Rng & Units 9/21/2019 9/19/2019 9/19/2019           5:02 PM  8:08 AM  8:08 AM   ABS. NEUTROPHILS      1.8 - 8.0 K/UL 1.3 (L)  1.7 (L)       Nursing screenings reviewed by provider at visit. Past Medical History:   Diagnosis Date    Asthma     Seizures ([facility] [address]     last seizure 2001, 2016. Updated last seizure in 2016 at visit. Prior to Admission medications    Medication Sig Start Date End Date Taking? Authorizing Provider   LORazepam (ATIVAN) 0.5 mg tablet Take 0.5 mg by mouth two (2) times a day. Yes Provider, Historical   OLANZapine (ZYPREXA) 10 mg tablet Take 10 mg by mouth nightly. Yes Provider, Historical   QUEtiapine (SEROQUEL) 400 mg tablet Take 400 mg by mouth three (3) times daily. 7am, 2pm, 8pm   Yes Provider, Historical   sertraline (ZOLOFT) 50 mg tablet Take 50 mg by mouth daily. Yes Provider, Historical   acetaminophen (TYLENOL) 325 mg tablet Take 650 mg by mouth every six (6) hours as needed for Pain or Fever. Yes Provider, Historical   fexofenadine (ALLEGRA) 180 mg tablet Take 1 Tab by mouth daily. 7/24/19  Yes Radha Berrios MD   albuterol (PROVENTIL HFA, VENTOLIN HFA, PROAIR HFA) 90 mcg/actuation inhaler Take 2 Puffs by inhalation every four (4) hours as needed for Wheezing. 5/14/19  Yes Rodolfo Farooq NP   CLEARLAX 17 gram/dose powder MIX 1 CAPFUL (SEE 17 gm LINE) IN A GLASS OF WATER & DRINK ONCE DAILY FOR CONSTIPATION. 8/9/19   Radha Berrios MD   atomoxetine (STRATTERA) 100 mg capsule Take 100 mg by mouth daily. Provider, Historical   divalproex DR (DEPAKOTE) 500 mg tablet Take  by mouth three (3) times daily.     Provider, Historical        ROS    Vitals:    09/25/19 1320   BP: 130/88   Pulse: (!) 102   Resp: 14   Temp: 97.6 °F (36.4 °C)   TempSrc: Oral   SpO2: 95%   Weight: 193 lb 4 oz (87.7 kg)   Height: 5' 10\" (1.778 m)   PainSc:   0 - No pain      Body mass index is 27.73 kg/m². Physical Exam:     Physical Exam   Constitutional: He appears well-developed and well-nourished. No distress. HENT:   Head: Normocephalic and atraumatic. Eyes: Conjunctivae are normal. Right eye exhibits no discharge. Left eye exhibits no discharge. No scleral icterus. Cardiovascular: Normal rate, regular rhythm, normal heart sounds and intact distal pulses. Exam reveals no gallop and no friction rub. No murmur heard. Pulmonary/Chest: Effort normal and breath sounds normal. No respiratory distress. He has no wheezes. He has no rales. Abdominal: Soft. Bowel sounds are normal. He exhibits no distension. There is no tenderness. Musculoskeletal: He exhibits no edema or tenderness. Neurological: He is alert. He exhibits normal muscle tone. Coordination normal.   Skin: Skin is warm. No rash noted. He is not diaphoretic. No erythema. No pallor. Psychiatric: He has a normal mood and affect. His behavior is normal. Judgment and thought content normal.       Assessment and Plan:       ICD-10-CM ICD-9-CM    1. Seizure disorder ([facility] G40.909 345.90    2. Valproic acid causing adverse effect in therapeutic use, initial encounter T42.6X5A E936.3    3. Hospital discharge follow-up Z09 V67.59    4. Schizophrenia, unspecified type ([facility] F20.9 295.90        1,2. Follow-up with neurology reviewed to determine alternative seizure medication. Presented with altered mental status and elevated valproic acid level per review of hospital records. 3.  Managed with mental health provider as above.       Follow-up and Dispositions    · Return if symptoms worsen or fail to improve.       lab results and schedule of future lab studies reviewed with patient  reviewed medications and side effects in detail    For additional documentation of information and/or recommendations discussed this visit, please see notes in instructions. Plan and evaluation (above) reviewed with pt at visit  Patient voiced understanding of plan and provided with time to ask/review questions. After Visit Summary (AVS) provided to pt after visit with additional instructions as needed/reviewed.         Future Appointments   Date Time Provider Juana Monteiro   10/8/2019  1:00 PM [address] [facility], [address] [facility]

## 2019-09-25 NOTE — PATIENT INSTRUCTIONS
Please keep appt with Dr. Wolfgang Cortez as scheduled. She will be the new provider who will manage your seizures.

## 2019-09-25 NOTE — PROGRESS NOTES
RM 18    Patient not fasting. Patient refused flu vaccine. Chief Complaint   Patient presents with    Medication Reaction     Valproic acid toxicity     1. Have you been to the ER, urgent care clinic since your last visit? Hospitalized since your last visit? Yes Reason for visit: 9/19/19, [facility], Valproic acid toxicity    2. Have you seen or consulted any other health care providers outside of the [address] [place] [address] since your last visit? Include any pap smears or colon screening. No    Health Maintenance Due   Topic Date Due    Pneumococcal 0-64 years (1 of 1 - PPSV23) 04/28/2001    DTaP/Tdap/Td series (1 - Tdap) 04/28/2016     Abuse Screening Questionnaire 9/25/2019   Do you ever feel afraid of your partner? N   Are you in a relationship with someone who physically or mentally threatens you? N   Is it safe for you to go home?  Y     3 most recent PHQ Screens 9/25/2019   PHQ Not Done -   Little interest or pleasure in doing things Not at all   Feeling down, depressed, irritable, or hopeless Several days   Total Score PHQ 2 1   Trouble falling or staying asleep, or sleeping too much -   Feeling tired or having little energy -   Poor appetite, weight loss, or overeating -   Feeling bad about yourself - or that you are a failure or have let yourself or your family down -   Trouble concentrating on things such as school, work, reading, or watching TV -   Moving or speaking so slowly that other people could have noticed; or the opposite being so fidgety that others notice -   Thoughts of being better off dead, or hurting yourself in some way -   PHQ 9 Score -

## 2019-10-02 ENCOUNTER — OFFICE VISIT (OUTPATIENT)
Dept: INTERNAL MEDICINE CLINIC | Age: 24
End: 2019-10-02

## 2019-10-02 VITALS
RESPIRATION RATE: 16 BRPM | WEIGHT: 194.4 LBS | BODY MASS INDEX: 27.83 KG/M2 | HEIGHT: 70 IN | TEMPERATURE: 97.8 F | DIASTOLIC BLOOD PRESSURE: 84 MMHG | HEART RATE: 113 BPM | SYSTOLIC BLOOD PRESSURE: 120 MMHG | OXYGEN SATURATION: 96 %

## 2019-10-02 DIAGNOSIS — Z20.2 POSSIBLE EXPOSURE TO STD: ICD-10-CM

## 2019-10-02 DIAGNOSIS — R30.0 DYSURIA: Primary | ICD-10-CM

## 2019-10-02 LAB
BILIRUB UR QL STRIP: NEGATIVE
GLUCOSE UR-MCNC: NEGATIVE MG/DL
KETONES P FAST UR STRIP-MCNC: NEGATIVE MG/DL
PH UR STRIP: 6 [PH] (ref 4.6–8)
PROT UR QL STRIP: NORMAL
SP GR UR STRIP: 1.02 (ref 1–1.03)
UA UROBILINOGEN AMB POC: NORMAL (ref 0.2–1)
URINALYSIS CLARITY POC: CLEAR
URINALYSIS COLOR POC: YELLOW
URINE BLOOD POC: NEGATIVE
URINE LEUKOCYTES POC: NEGATIVE
URINE NITRITES POC: NEGATIVE

## 2019-10-02 NOTE — PROGRESS NOTES
Rm 7    Chief Complaint   Patient presents with    Urinary Burning     started yesterday    Flank Pain     started yesterday    Vomiting     started tuesday, happens after eating    Exposure to STD   unable to give urine, water provided    1. Have you been to the ER, urgent care clinic since your last visit? Hospitalized since your last visit? No    2. Have you seen or consulted any other health care providers outside of the [address] [place] [address] since your last visit? Include any pap smears or colon screening.  No    Health Maintenance Due   Topic Date Due    Pneumococcal 0-64 years (1 of 1 - PPSV23) 04/28/2001    DTaP/Tdap/Td series (1 - Tdap) 04/28/2016     3 most recent PHQ Screens 9/25/2019   PHQ Not Done -   Little interest or pleasure in doing things Not at all   Feeling down, depressed, irritable, or hopeless Several days   Total Score PHQ 2 1   Trouble falling or staying asleep, or sleeping too much -   Feeling tired or having little energy -   Poor appetite, weight loss, or overeating -   Feeling bad about yourself - or that you are a failure or have let yourself or your family down -   Trouble concentrating on things such as school, work, reading, or watching TV -   Moving or speaking so slowly that other people could have noticed; or the opposite being so fidgety that others notice -   Thoughts of being better off dead, or hurting yourself in some way -   PHQ 9 Score -     Learning Assessment 7/24/2019   PRIMARY LEARNER Patient   HIGHEST LEVEL OF EDUCATION - PRIMARY LEARNER  -   BARRIERS PRIMARY LEARNER NONE   CO-LEARNER CAREGIVER -   PRIMARY LANGUAGE ENGLISH   LEARNER PREFERENCE PRIMARY VIDEOS   ANSWERED BY patient   RELATIONSHIP SELF
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
## 2019-10-04 LAB
C TRACH RRNA SPEC QL NAA+PROBE: NEGATIVE
N GONORRHOEA RRNA SPEC QL NAA+PROBE: NEGATIVE
T VAGINALIS DNA SPEC QL NAA+PROBE: NEGATIVE

## 2019-10-05 LAB
HBV CORE AB SERPL QL IA: NEGATIVE
HBV SURFACE AB SER-ACNC: <3.1 MIU/ML
HBV SURFACE AG SERPL QL IA: NEGATIVE
HCV AB S/CO SERPL IA: <0.1 S/CO RATIO (ref 0–0.9)
HCV AB SERPL QL IA: NORMAL
HIV 1+2 AB+HIV1 P24 AG SERPL QL IA: NON REACTIVE
RPR SER QL: NON REACTIVE
T PALLIDUM AB SER QL IA: NORMAL
T PALLIDUM IGG SER QL IB: NEGATIVE

## 2019-10-08 ENCOUNTER — OFFICE VISIT (OUTPATIENT)
Dept: NEUROLOGY | Age: 24
End: 2019-10-08

## 2019-10-08 VITALS
RESPIRATION RATE: 12 BRPM | BODY MASS INDEX: 28.77 KG/M2 | WEIGHT: 201 LBS | DIASTOLIC BLOOD PRESSURE: 86 MMHG | HEIGHT: 70 IN | HEART RATE: 95 BPM | SYSTOLIC BLOOD PRESSURE: 120 MMHG | OXYGEN SATURATION: 97 %

## 2019-10-08 DIAGNOSIS — G40.909 SEIZURE DISORDER (HCC): Primary | ICD-10-CM

## 2019-10-08 DIAGNOSIS — F20.3 UNDIFFERENTIATED SCHIZOPHRENIA (HCC): ICD-10-CM
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
## 2020-01-30 DIAGNOSIS — J30.89 ALLERGIC RHINITIS DUE TO OTHER ALLERGIC TRIGGER, UNSPECIFIED SEASONALITY: ICD-10-CM

## 2020-02-03 RX ORDER — MINERAL OIL
ENEMA (ML) RECTAL
Qty: 31 TAB | Refills: 11 | Status: SHIPPED | OUTPATIENT
Start: 2020-02-03 | End: 2021-06-01

## 2020-09-10 ENCOUNTER — OFFICE VISIT (OUTPATIENT)
Dept: PODIATRY | Age: 25
End: 2020-09-10
Payer: COMMERCIAL

## 2020-09-10 VITALS
OXYGEN SATURATION: 97 % | HEIGHT: 69 IN | SYSTOLIC BLOOD PRESSURE: 126 MMHG | TEMPERATURE: 97.5 F | HEART RATE: 89 BPM | DIASTOLIC BLOOD PRESSURE: 84 MMHG | BODY MASS INDEX: 29.68 KG/M2

## 2020-09-10 DIAGNOSIS — S99.821A TRAUMATIC FRACTURE OF TOENAIL OF RIGHT FOOT, INITIAL ENCOUNTER: Primary | ICD-10-CM

## 2020-09-10 PROCEDURE — 99203 OFFICE O/P NEW LOW 30 MIN: CPT | Performed by: PODIATRIST

## 2020-09-10 PROCEDURE — 11730 AVULSION NAIL PLATE SIMPLE 1: CPT | Performed by: PODIATRIST

## 2020-09-10 RX ORDER — HYDROXYZINE 50 MG/1
50 TABLET, FILM COATED ORAL
COMMUNITY
End: 2021-06-01

## 2020-09-10 RX ORDER — DIPHENHYDRAMINE HCL 50 MG
25 CAPSULE ORAL
COMMUNITY
End: 2021-06-01

## 2020-09-10 RX ORDER — DIVALPROEX SODIUM 500 MG/1
TABLET, DELAYED RELEASE ORAL 3 TIMES DAILY
COMMUNITY

## 2020-09-10 RX ORDER — IBUPROFEN 800 MG/1
TABLET ORAL
COMMUNITY
End: 2021-06-01

## 2020-09-10 RX ORDER — SILVER SULFADIAZINE 10 G/1000G
CREAM TOPICAL DAILY
Qty: 25 G | Refills: 0 | Status: SHIPPED | OUTPATIENT
Start: 2020-09-10 | End: 2021-06-01

## 2020-09-10 NOTE — PROGRESS NOTES
Podiatry History and Physical    Subjective:         Patient is a 22 y.o. male who is being seen as a new pt for trauma to his right hallux. Patient states a few days prior he was playing basketball at his facility and injured the toenail. He states blood was noted and the facility has been applying topical ointment. He was referred to our office for further management. He is a resident of a local psychiatric facility. He states he is having pain rising to the level of 5 out of 10. He states the pain is mild and localized to the hallux. He states pressure and prolonged ambulation exacerbate the pain. He states elevation relieves the pain. He denies any systemic signs of infection but states there is clear drainage. He denies any other lower extremity complaints    Past Medical History:   Diagnosis Date    Anxiety disorder     Asthma     [facility]erebral [facility] occlusion with [facility] infarction ([facility] [address]     Depression     Seizures ([facility] [address]     last seizure 2001, 2016. Past Surgical History:   Procedure Laterality Date    HX ORTHOPAEDIC      HX OTHER SURGICAL Right 05/01/2018    Excision of Sebaceous cyst of right thigh by Dr. Stanislaw Hudson. Family History   Problem Relation Age of Onset    Hypertension Mother     Hypertension Father       Social History     Tobacco Use    Smoking status: Current Every Day Smoker     Packs/day: 1.00    Smokeless tobacco: Never Used   Substance Use Topics    Alcohol use: Not Currently     Alcohol/week: 1.0 - 2.0 standard drinks     Types: 1 - 2 Shots of liquor per week     Comment: rare     Allergies   Allergen Reactions    Bee Sting [Sting, Bee] Sneezing    Coconut Oil Hives    Dog Dander Sneezing    Mushroom Nausea and Vomiting    Red Dye Nausea and Vomiting     Has with fruit punch or red Mountain Dew. Prior to Admission medications    Medication Sig Start Date End Date Taking? Authorizing Provider   magnesium hydroxide (MILK OF MAGNESIA PO) Take  by mouth. Yes Provider, Historical   ibuprofen (MOTRIN) 800 mg tablet Take  by mouth. Yes Provider, Historical   diphenhydrAMINE (BENADRYL) 50 mg capsule Take 25 mg by mouth every six (6) hours as needed. Yes Provider, Historical   mag hydrox/aluminum hyd/simeth (ANTACID PO) Take  by mouth. Yes Provider, Historical   hydrOXYzine HCL (ATARAX) 50 mg tablet Take 50 mg by mouth three (3) times daily as needed. Yes Provider, Historical   divalproex DR (DEPAKOTE) 500 mg tablet Take  by mouth three (3) times daily. Yes Provider, Historical   fexofenadine (ALLEGRA) 180 mg tablet TAKE ONE TABLET DAILY (FOR ALLERGY) 2/3/20  Yes Joey Murphy MD   OLANZapine (ZYPREXA) 10 mg tablet Take 10 mg by mouth nightly. Yes Provider, Historical   QUEtiapine (SEROQUEL) 400 mg tablet Take 400 mg by mouth three (3) times daily. 7am, 2pm, 8pm   Yes Provider, Historical   acetaminophen (TYLENOL) 325 mg tablet Take 650 mg by mouth every six (6) hours as needed for Pain or Fever. Yes Provider, Historical   CLEARLAX 17 gram/dose powder MIX 1 CAPFUL (SEE 17 gm LINE) IN A GLASS OF WATER & DRINK ONCE DAILY FOR CONSTIPATION. 8/9/19  Yes Joey Murphy MD   albuterol (PROVENTIL HFA, VENTOLIN HFA, PROAIR HFA) 90 mcg/actuation inhaler Take 2 Puffs by inhalation every four (4) hours as needed for Wheezing. 5/14/19  Yes Patricia Pardo NP   LORazepam (ATIVAN) 0.5 mg tablet Take 0.5 mg by mouth two (2) times a day. Provider, Historical   sertraline (ZOLOFT) 50 mg tablet Take 50 mg by mouth daily. Provider, Historical   atomoxetine (STRATTERA) 100 mg capsule Take 100 mg by mouth daily. Provider, Historical       Review of Systems   Constitutional: Negative. HENT: Negative. Eyes: Negative. Respiratory: Negative. Cardiovascular: Negative. Gastrointestinal: Negative. Endocrine: Negative. Genitourinary: Negative. Musculoskeletal: Negative. Skin: Negative. Allergic/Immunologic: Negative. Neurological: Negative. Hematological: Negative. Psychiatric/Behavioral: Positive for agitation, dysphoric mood and hallucinations. All other systems reviewed and are negative. Objective:     Visit Vitals  /84 (BP 1 Location: Right arm, BP Patient Position: Sitting)   Pulse 89   Temp 97.5 °F (36.4 °C) (Temporal)   Ht 5' 9\" (1.753 m)   SpO2 97%   BMI 29.68 kg/m²       Physical Exam  Vitals signs reviewed. Constitutional:       Appearance: He is overweight. HENT:      Mouth/Throat:      Dentition: Abnormal dentition. Cardiovascular:      Pulses:           Dorsalis pedis pulses are 2+ on the right side and 2+ on the left side. Posterior tibial pulses are 2+ on the right side and 2+ on the left side. Pulmonary:      Effort: Pulmonary effort is normal.   Musculoskeletal:      Right lower leg: No edema. Left lower leg: No edema. Right foot: Decreased range of motion. Deformity present. No bunion or Charcot foot. Left foot: Normal range of motion. No deformity, bunion or Charcot foot. Feet:      Right foot:      Protective Sensation: 10 sites tested. 10 sites sensed. Skin integrity: Erythema present. Toenail Condition: Right toenails are abnormally thick and ingrown. Left foot:      Protective Sensation: 10 sites tested. 10 sites sensed. Skin integrity: Skin integrity normal.      Toenail Condition: Left toenails are normal.   Skin:     General: Skin is warm. Capillary Refill: Capillary refill takes 2 to 3 seconds. Neurological:      Mental Status: He is alert and oriented to person, place, and time. Psychiatric:         Mood and Affect: Mood and affect normal.         Behavior: Behavior is cooperative. Data Review: No results found for this or any previous visit (from the past 24 hour(s)). Impression:       ICD-10-CM ICD-9-CM    1.  Traumatic fracture of toenail of right foot, initial encounter  S98.087V 959.7 Recommendation:     Patient seen and evaluated in the office  Discussed and educated patient regarding his current medical condition  A partial toenail avulsion was performed to the right hallux without anesthesia.   Patient tolerated well and appropriate dressing was applied  Gave prescription for Silvadene 1% topical antibiotic cream to be applied for the next 7 to 10 days or until drainage stops  Also recommended Epsom salt and warm water soaks for the next 7 to 10 days or until drainage stops  Patient to return the office immediately if condition worsens        RTC in PRN

## 2020-09-11 RX ORDER — OLANZAPINE 20 MG/1
30 TABLET ORAL
COMMUNITY
End: 2021-06-01

## 2020-09-11 RX ORDER — QUETIAPINE FUMARATE 100 MG/1
50 TABLET, FILM COATED ORAL DAILY
COMMUNITY

## 2021-06-01 ENCOUNTER — OFFICE VISIT (OUTPATIENT)
Dept: INTERNAL MEDICINE CLINIC | Age: 26
End: 2021-06-01
Payer: MEDICAID

## 2021-06-01 VITALS
BODY MASS INDEX: 28.85 KG/M2 | TEMPERATURE: 98.1 F | WEIGHT: 195.38 LBS | OXYGEN SATURATION: 95 % | HEART RATE: 84 BPM | RESPIRATION RATE: 14 BRPM | DIASTOLIC BLOOD PRESSURE: 77 MMHG | SYSTOLIC BLOOD PRESSURE: 118 MMHG

## 2021-06-01 DIAGNOSIS — Z00.00 WELL ADULT EXAM: Primary | ICD-10-CM

## 2021-06-01 DIAGNOSIS — Z71.85 IMMUNIZATION COUNSELING: ICD-10-CM

## 2021-06-01 DIAGNOSIS — Z87.09 HISTORY OF ACUTE BRONCHITIS WITH BRONCHOSPASM: ICD-10-CM

## 2021-06-01 DIAGNOSIS — R94.6 BORDERLINE ABNORMAL TFTS: ICD-10-CM

## 2021-06-01 DIAGNOSIS — D69.6 THROMBOCYTOPENIA (HCC): ICD-10-CM

## 2021-06-01 PROCEDURE — 99213 OFFICE O/P EST LOW 20 MIN: CPT | Performed by: INTERNAL MEDICINE

## 2021-06-01 PROCEDURE — 99395 PREV VISIT EST AGE 18-39: CPT | Performed by: INTERNAL MEDICINE

## 2021-06-01 RX ORDER — ALBUTEROL SULFATE 90 UG/1
2 AEROSOL, METERED RESPIRATORY (INHALATION)
Qty: 1 INHALER | Refills: 2 | Status: SHIPPED | OUTPATIENT
Start: 2021-06-01

## 2021-06-01 RX ORDER — TRAZODONE HYDROCHLORIDE 50 MG/1
TABLET ORAL
COMMUNITY
Start: 2021-05-21

## 2021-06-01 NOTE — PATIENT INSTRUCTIONS
1.  If/when you receive his COVID vaccine documentation, please fax to us at [phone]. Please check to see if you have other vaccine records, including vaccination for: 
--Pneumovax (Pneumococcal PolySaccaride Vaccine-23-type or PPSV-23) --Tdap (Tetanus, diphtheria and acellular pertussis/\"whooping cough\") 2. If we need to repeat your labs we can do so with follow-up as reviewed. Please note:   
--A1c (blood glucose/diabetes) monitoring can be repeated every 3mo as a non-fasting lab as needed. --Cholesterol/lipids can be repeated every 3-6mo as needed, but this test is more helpful if you are fasting for labs. --For fasting labs, recommend you do not take in any food or fluids with calories for 6-8 hours prior to labs being drawn. --For non-fasting labs, there are no specific recommendations, as food/drink does not typically change the results of these labs. Well Visit, Ages 25 to 48: Care Instructions Overview Well visits can help you stay healthy. Your doctor has checked your overall health and may have suggested ways to take good care of yourself. Your doctor also may have recommended tests. At home, you can help prevent illness with healthy eating, regular exercise, and other steps. Follow-up care is a key part of your treatment and safety. Be sure to make and go to all appointments, and call your doctor if you are having problems. It's also a good idea to know your test results and keep a list of the medicines you take. How can you care for yourself at home? · Get screening tests that you and your doctor decide on. Screening helps find diseases before any symptoms appear. · Eat healthy foods. Choose fruits, vegetables, whole grains, protein, and low-fat dairy foods. Limit fat, especially saturated fat. Reduce salt in your diet. · Limit alcohol. If you are a man, have no more than 2 drinks a day or 14 drinks a week.  If you are a woman, have no more than 1 drink a day or 7 drinks a week. · Get at least 30 minutes of physical activity on most days of the week. Walking is a good choice. You also may want to do other activities, such as running, swimming, cycling, or playing tennis or team sports. Discuss any changes in your exercise program with your doctor. · Reach and stay at a healthy weight. This will lower your risk for many problems, such as obesity, diabetes, heart disease, and high blood pressure. · Do not smoke or allow others to smoke around you. If you need help quitting, talk to your doctor about stop-smoking programs and medicines. These can increase your chances of quitting for good. · Care for your mental health. It is easy to get weighed down by worry and stress. Learn strategies to manage stress, like deep breathing and mindfulness, and stay connected with your family and community. If you find you often feel sad or hopeless, talk with your doctor. Treatment can help. · Talk to your doctor about whether you have any risk factors for sexually transmitted infections (STIs). You can help prevent STIs if you wait to have sex with a new partner (or partners) until you've each been tested for STIs. It also helps if you use condoms (male or female condoms) and if you limit your sex partners to one person who only has sex with you. Vaccines are available for some STIs, such as HPV. · Use birth control if it's important to you to prevent pregnancy. Talk with your doctor about the choices available and what might be best for you. · If you think you may have a problem with alcohol or drug use, talk to your doctor. This includes prescription medicines (such as amphetamines and opioids) and illegal drugs (such as cocaine and methamphetamine). Your doctor can help you figure out what type of treatment is best for you. · Protect your skin from too much sun.  When you're outdoors from 10 a.m. to 4 p.m., stay in the shade or cover up with clothing and a hat with a wide brim. Wear sunglasses that block UV rays. Even when it's cloudy, put broad-spectrum sunscreen (SPF 30 or higher) on any exposed skin. · See a dentist one or two times a year for checkups and to have your teeth cleaned. · Wear a seat belt in the car. When should you call for help? Watch closely for changes in your health, and be sure to contact your doctor if you have any problems or symptoms that concern you. Where can you learn more? Go to http://www.mcke[employer].com/ Enter P072 in the search box to learn more about \"Well Visit, Ages 25 to 48: Care Instructions. \" Current as of: May 27, 2020               Content Version: 12.8 © 2006-2021 [employer]. Care instructions adapted under license by [employer] (which disclaims liability or warranty for this information). If you have questions about a medical condition or this instruction, always ask your healthcare professional. [place] [address] any warranty or liability for your use of this information.

## 2021-06-01 NOTE — PROGRESS NOTES
RM 17    Chief Complaint   Patient presents with    Complete Physical       1. Have you been to the ER, urgent care clinic since your last visit? Hospitalized since your last visit? No    2. Have you seen or consulted any other health care providers outside of the [address] [place] [address] since your last visit? Include any pap smears or colon screening. No    Health Maintenance Due   Topic Date Due    Pneumococcal 0-64 years (1 of 2 - PPSV23) Never done    COVID-19 Vaccine (1) Never done    DTaP/Tdap/Td series (1 - Tdap) Never done       Abuse Screening Questionnaire 6/1/2021   Do you ever feel afraid of your partner? N   Are you in a relationship with someone who physically or mentally threatens you? N   Is it safe for you to go home?  Y       3 most recent PHQ Screens 6/1/2021   PHQ Not Done Active Diagnosis of Depression or Bipolar Disorder   Little interest or pleasure in doing things Not at all   Feeling down, depressed, irritable, or hopeless Not at all   Total Score PHQ 2 0   Trouble falling or staying asleep, or sleeping too much -   Feeling tired or having little energy -   Poor appetite, weight loss, or overeating -   Feeling bad about yourself - or that you are a failure or have let yourself or your family down -   Trouble concentrating on things such as school, work, reading, or watching TV -   Moving or speaking so slowly that other people could have noticed; or the opposite being so fidgety that others notice -   Thoughts of being better off dead, or hurting yourself in some way -   PHQ 9 Score -       Learning Assessment 7/24/2019   PRIMARY LEARNER Patient   HIGHEST LEVEL OF EDUCATION - PRIMARY LEARNER  -   BARRIERS PRIMARY LEARNER NONE   CO-LEARNER CAREGIVER -   PRIMARY LANGUAGE ENGLISH   LEARNER PREFERENCE PRIMARY VIDEOS   ANSWERED BY patient   RELATIONSHIP SELF

## 2021-06-01 NOTE — PROGRESS NOTES
Felicitas Pope (: 1995) is a 32 y.o. male, established patient, here for evaluation of the following chief complaint(s):  Chief Complaint   Patient presents with    Complete Physical       Assessment and Plan:       ICD-10-CM ICD-9-CM    1. Well adult exam  Z00.00 V70.0    2. Immunization counseling  Z71.89 V65.49    3. History of acute bronchitis with bronchospasm  Z87.09 V12.69 albuterol (PROVENTIL HFA, VENTOLIN HFA, PROAIR HFA) 90 mcg/actuation inhaler   4. Thrombocytopenia (HCC)  D69.6 287.5    5. Borderline abnormal TFTs  R94.6 794.5        Diagnoses #1-2 for Preventive Care/Wellness visit. Due to significant separate problems unrelated to preventive care needs, also addressed following diagnoses/problems at visit as below (diagnoses #3-5 above). 1.  Prior labs from [facility] Provider reviewed. Reviewed lipid and A1c screening on current  meds. Not done with [facility] provider and they are not interested in doing at this time.  will coordinate follow-up for screening PRN/if pt interested. Not interested in STI screening at this time. 2.  Reviewed vaccines. Not interested in Tdap or PPSV-23 today. Has had COVID vaccine as reviewed below. 3.  Refill reviewed at visit. 4.  Eval for possible caused reviewed with hematology--not interested at this time, and pt improved as below. 5.  Reviewed repeat in few months to re-eval.  They will return here or coordinate follow-up with [facility] provider  with next labs. Follow-up and Dispositions    · Return in about 1 year (around 2022) for yearly physical, (fasting labs). lab results and schedule of future lab studies reviewed with patient  reviewed medications and side effects in detail    For additional documentation of information and/or recommendations discussed this visit, please see notes in instructions.       Plan and evaluation (above) reviewed with pt at visit  Patient voiced understanding of plan and provided with time to ask/review questions. After Visit Summary (AVS) provided to pt after visit with additional instructions as needed/reviewed. No future appointments. --Updated future visits after patient check-out. History of Present Illness:     Notes (nursing/rooming note copied below in italics):  As above    Here for physical.  LOV here 2019. He saw Neuro Dr. Osiel Berrios Oct 2019 for seizure eval but didn't have EEG done. He has labs from NP Rufino Camilo with [address] [facility]. Labs from 5-21-21:  --normal H86--jfwcs abnormal with last values here:  WBC 5.9 with ANC 3.2, ALC 1.9; Hgb 15.2; Plt 121. --Normal CMP with Glc 73; Creatinine 0.74; Normal LFT's.  --UDS negative  --TSH 5.64 (normal 0.45-4.5). T4 was 4.2 (normal 4.5-12.0). --VPA 82 (). WBC values normalized except plt from low WBC prior (here 2019 labs). He notes prefers to just return for labs (lipids, A1c, thryoid follow-up) once clarifies schedule/plan with [facility] provider. Not interested in STI screening at s time. Health Maintenance Due   Topic Date Due    Pneumococcal 0-64 years (1 of 2 - PPSV23) Never done    COVID-19 Vaccine (1) Never done    DTaP/Tdap/Td series (1 - Tdap) Never done     He has had COVID vaccine at [facility]. PL[facility]   Date Value Ref Range Status   09/21/2019 98 (L) 150 - 400 K/uL Final   09/19/2019 110 (L) 150 - 400 K/uL Final   01/18/2019 141 (L) 150 - 379 x10E3/uL Final       Nursing screenings reviewed by provider at visit. Past Medical History:   Diagnosis Date    Anxiety disorder     Asthma     Bipolar 1 disorder ([address]     Cerebral artery occlusion with cerebral infarction ([address]     Depression     Schizoaffective disorder ([address]     Seizures ([address]     last seizure 2001, 2016. Past Surgical History:   Procedure Laterality Date    HX ORTHOPAEDIC      HX OTHER SURGICAL Right 05/01/2018    Excision of Sebaceous cyst of right thigh by Dr. Cheryl Ruth.          Prior to Admission medications    Medication Sig Start Date End Date Taking? Authorizing Provider   traZODone (DESYREL) 50 mg tablet  5/21/21  Yes Provider, Historical   QUEtiapine (SEROquel) 100 mg tablet Take 50 mg by mouth daily. Yes Provider, Historical   divalproex DR (DEPAKOTE) 500 mg tablet Take  by mouth three (3) times daily. Yes Provider, Historical   OLANZapine (ZYPREXA) 10 mg tablet Take 15 mg by mouth nightly. Yes Provider, Historical   acetaminophen (TYLENOL) 325 mg tablet Take 650 mg by mouth every six (6) hours as needed for Pain or Fever. Yes Provider, Historical   albuterol (PROVENTIL HFA, VENTOLIN HFA, PROAIR HFA) 90 mcg/actuation inhaler Take 2 Puffs by inhalation every four (4) hours as needed for Wheezing. 5/14/19  Yes Kim Mcclelland NP        ROS    Vitals:    06/01/21 1454   BP: 118/77   Pulse: 84   Resp: 14   Temp: 98.1 °F (36.7 °C)   TempSrc: Oral   SpO2: 95%   Weight: 195 lb 6 oz (88.6 kg)   PainSc:   0 - No pain      Body mass index is 28.85 kg/m². Physical Exam:     Physical Exam  Vitals and nursing note reviewed. Constitutional:       General: He is not in acute distress. Appearance: Normal appearance. He is well-developed. He is not diaphoretic. HENT:      Head: Normocephalic and atraumatic. Right Ear: Tympanic membrane, ear canal and external ear normal.      Left Ear: Tympanic membrane, ear canal and external ear normal.      Mouth/Throat:      Pharynx: No oropharyngeal exudate. Eyes:      General: No scleral icterus. Right eye: No discharge. Left eye: No discharge. Conjunctiva/sclera: Conjunctivae normal.   Neck:      Thyroid: No thyromegaly. Trachea: No tracheal deviation. Cardiovascular:      Rate and Rhythm: Normal rate and regular rhythm. Pulses: Normal pulses. Heart sounds: Normal heart sounds. No murmur heard. No friction rub. No gallop. Pulmonary:      Effort: Pulmonary effort is normal. No respiratory distress. Breath sounds: Normal breath sounds. No stridor. No wheezing, rhonchi or rales. Abdominal:      General: Bowel sounds are normal. There is no distension. Palpations: Abdomen is soft. There is no mass. Tenderness: There is no abdominal tenderness. There is no guarding or rebound. Genitourinary:     Comments:  exam deferred--no concerns noted. Musculoskeletal:         General: No swelling, tenderness or deformity. Cervical back: Normal range of motion and neck supple. No rigidity or tenderness. No muscular tenderness. Right lower leg: No edema. Left lower leg: No edema. Lymphadenopathy:      Cervical: No cervical adenopathy. Skin:     General: Skin is warm. Coloration: Skin is not jaundiced or pale. Findings: No bruising, erythema or rash. Neurological:      General: No focal deficit present. Mental Status: He is alert. Motor: No weakness or abnormal muscle tone. Coordination: Coordination normal.      Gait: Gait normal.   Psychiatric:         Mood and Affect: Mood normal.         Behavior: Behavior normal.         Thought Content: Thought content normal.         Judgment: Judgment normal.         An electronic signature was used to authenticate this note.   -- Cesario Carrillo MD

## 2021-07-06 DIAGNOSIS — Z87.09 HISTORY OF ACUTE BRONCHITIS WITH BRONCHOSPASM: ICD-10-CM

## 2021-07-07 NOTE — TELEPHONE ENCOUNTER
Please clarify refill for albuterol HFA with pt. He had 1 inhaler with 2 refills sent to same pharmacy on 6-2-12 and Receipt electronically verified by pharmacy. He should not have used 3 inhalers in 1mo.

## 2021-07-12 RX ORDER — ALBUTEROL SULFATE 90 UG/1
AEROSOL, METERED RESPIRATORY (INHALATION)
Qty: 8.5 G | Refills: 0 | OUTPATIENT
Start: 2021-07-12

## 2021-07-12 NOTE — TELEPHONE ENCOUNTER
Spoke to pt about refill request. States he uses inhaler every once in a while. Was advised to contact his pharmacy for a refill since he should have more refills available there at this time.  No further questions,

## 2022-03-18 PROBLEM — F20.9 SCHIZOPHRENIA (HCC): Status: ACTIVE | Noted: 2019-03-25

## 2022-03-19 PROBLEM — S99.821A: Status: ACTIVE | Noted: 2020-09-10

## 2022-08-12 ENCOUNTER — HOSPITAL ENCOUNTER (EMERGENCY)
Age: 27
Discharge: HOME | End: 2022-08-12
Attending: STUDENT IN AN ORGANIZED HEALTH CARE EDUCATION/TRAINING PROGRAM
Payer: MEDICAID

## 2022-08-12 VITALS
WEIGHT: 205 LBS | HEART RATE: 71 BPM | HEIGHT: 69 IN | SYSTOLIC BLOOD PRESSURE: 151 MMHG | RESPIRATION RATE: 18 BRPM | BODY MASS INDEX: 30.36 KG/M2 | OXYGEN SATURATION: 98 % | DIASTOLIC BLOOD PRESSURE: 99 MMHG | TEMPERATURE: 97.9 F

## 2022-08-12 DIAGNOSIS — K08.89 DENTALGIA: Primary | ICD-10-CM

## 2022-08-12 PROCEDURE — 99283 EMERGENCY DEPT VISIT LOW MDM: CPT

## 2022-08-12 RX ORDER — AMOXICILLIN 500 MG/1
500 TABLET, FILM COATED ORAL 3 TIMES DAILY
Qty: 30 TABLET | Refills: 0 | Status: SHIPPED | OUTPATIENT
Start: 2022-08-12

## 2022-08-12 RX ORDER — IBUPROFEN 600 MG/1
600 TABLET ORAL
Qty: 20 TABLET | Refills: 0 | Status: SHIPPED | OUTPATIENT
Start: 2022-08-12

## 2022-08-12 NOTE — ED PROVIDER NOTES
EMERGENCY DEPARTMENT HISTORY AND PHYSICAL EXAM      Date: 8/12/2022  Patient Name: Uvaldo Brunner    History of Presenting Illness     Chief Complaint   Patient presents with    Dental Pain       History Provided By: Patient    HPI: Uvaldo Brunner, 32 y.o. male with a significant past medical history of asthma presents to the ED with dental pain. Patient has had mild and dental pain for the past couple weeks. Patient has a dentist appointment next week. Patient comes in for evaluation of this. Patient denies any constitutional symptoms. Moderate severity, no known exacerbating or relieving factors, no other associated signs and symptoms     There are no other complaints, changes, or physical findings at this time. PCP: JESUS ALBERTO Quinteros    No current facility-administered medications on file prior to encounter. Current Outpatient Medications on File Prior to Encounter   Medication Sig Dispense Refill    traZODone (DESYREL) 50 mg tablet       albuterol (PROVENTIL HFA, VENTOLIN HFA, PROAIR HFA) 90 mcg/actuation inhaler Take 2 Puffs by inhalation every four (4) hours as needed for Wheezing. 1 Inhaler 2    QUEtiapine (SEROquel) 100 mg tablet Take 50 mg by mouth daily. divalproex DR (DEPAKOTE) 500 mg tablet Take  by mouth three (3) times daily. OLANZapine (ZYPREXA) 10 mg tablet Take 15 mg by mouth nightly. acetaminophen (TYLENOL) 325 mg tablet Take 650 mg by mouth every six (6) hours as needed for Pain or Fever. Past History     Past Medical History:  Past Medical History:   Diagnosis Date    Anxiety disorder     Asthma     Bipolar 1 disorder ([address]     Cerebral artery occlusion with cerebral infarction ([address]     Depression     Schizoaffective disorder ([address]     Seizures ([address]     last seizure 2001, 2016.          Past Surgical History:  Past Surgical History:   Procedure Laterality Date    HX ORTHOPAEDIC      HX OTHER SURGICAL Right 05/01/2018    Excision of Sebaceous cyst of right thigh by Dr. Micheline Nicholson. Family History:  Family History   Problem Relation Age of Onset    Hypertension Mother     Hypertension Father        Social History:  Social History     Tobacco Use    Smoking status: Every Day     Packs/day: 1.00     Types: Cigarettes    Smokeless tobacco: Never   Substance Use Topics    Alcohol use: Not Currently     Alcohol/week: 1.0 - 2.0 standard drink     Types: 1 - 2 Shots of liquor per week    Drug use: Yes     Types: Marijuana     Comment: socially, not often       Allergies: Allergies   Allergen Reactions    Bee Sting [Sting, Bee] Sneezing    Coconut Oil Hives    Dog Dander Sneezing    Mushroom Nausea and Vomiting    Red Dye Nausea and Vomiting     Has with fruit punch or red [facility]. Review of Systems   Review of Systems   Constitutional:  Negative for chills and fever. HENT:  Positive for dental problem. Negative for sore throat. Eyes:  Negative for pain and visual disturbance. Respiratory:  Negative for cough and chest tightness. Cardiovascular:  Negative for chest pain. Gastrointestinal:  Negative for diarrhea and nausea. Genitourinary:  Negative for difficulty urinating and frequency. Musculoskeletal:  Negative for gait problem and joint swelling. Neurological:  Positive for headaches. Negative for numbness. Hematological:  Negative for adenopathy. Does not bruise/bleed easily. Psychiatric/Behavioral:  Negative for behavioral problems and suicidal ideas. Physical Exam   Physical Exam  Constitutional:       General: He is not in acute distress. Appearance: Normal appearance. He is not ill-appearing or toxic-appearing. HENT:      Head: Normocephalic and atraumatic. Nose: Nose normal.      Mouth/Throat:      Mouth: Mucous membranes are moist.     Eyes:      Extraocular Movements: Extraocular movements intact. Pupils: Pupils are equal, round, and reactive to light.    Cardiovascular:      Rate and Rhythm: Normal rate and regular rhythm. Pulmonary:      Effort: Pulmonary effort is normal.      Breath sounds: Normal breath sounds. Abdominal:      General: Bowel sounds are normal.   Musculoskeletal:         General: Normal range of motion. Cervical back: Normal range of motion and neck supple. Skin:     General: Skin is warm and dry. Capillary Refill: Capillary refill takes less than 2 seconds. Neurological:      General: No focal deficit present. Mental Status: He is alert and oriented to person, place, and time. Psychiatric:         Mood and Affect: Mood normal.         Behavior: Behavior normal.       Lab and Diagnostic Study Results   Labs -   No results found for this or any previous visit (from the past 12 hour(s)). Radiologic Studies -   @lastxrresult@  CT Results  (Last 48 hours)      None          CXR Results  (Last 48 hours)      None            Medical Decision Making and ED Course   - I am the first provider for this patient. I reviewed the vital signs, available nursing notes, past medical history, past surgical history, family history and social history. - Initial assessment performed. The patients presenting problems have been discussed, and they are in agreement with the care plan formulated and outlined with them. I have encouraged them to ask questions as they arise throughout their visit. Vital Signs-Reviewed the patient's vital signs. No data found. Differential Diagnosis & Medical Decision Making Provider Note:   Patient presents with dental pain. Stable vitals and exam without obvious abscess that needs drainage. Airway stable and patient speaking in full sentences. No red flags that make PTA, RPA, ludwigs angina concerning. Will tx with dental ball, antibiotics and outpatient analgesics. Given information on dentists and importance of followup and no smoking.      ED Course:          Procedures   Performed by: Jeanne Gambino NP  Procedures      Disposition   Disposition: DC- Adult Discharges: All of the diagnostic tests were reviewed and questions answered. Diagnosis, care plan and treatment options were discussed. The patient understands the instructions and will follow up as directed. The patients results have been reviewed with them. They have been counseled regarding their diagnosis. The patient verbally convey understanding and agreement of the signs, symptoms, diagnosis, treatment and prognosis and additionally agrees to follow up as recommended with their PCP in 24 - 48 hours. They also agree with the care-plan and convey that all of their questions have been answered. I have also put together some discharge instructions for them that include: 1) educational information regarding their diagnosis, 2) how to care for their diagnosis at home, as well a 3) list of reasons why they would want to return to the ED prior to their follow-up appointment, should their condition change. Discharged    DISCHARGE PLAN:  1. Current Discharge Medication List        CONTINUE these medications which have NOT CHANGED    Details   traZODone (DESYREL) 50 mg tablet       albuterol (PROVENTIL HFA, VENTOLIN HFA, PROAIR HFA) 90 mcg/actuation inhaler Take 2 Puffs by inhalation every four (4) hours as needed for Wheezing. Qty: 1 Inhaler, Refills: 2    Associated Diagnoses: History of acute bronchitis with bronchospasm      QUEtiapine (SEROquel) 100 mg tablet Take 50 mg by mouth daily. divalproex DR (DEPAKOTE) 500 mg tablet Take  by mouth three (3) times daily. OLANZapine (ZYPREXA) 10 mg tablet Take 15 mg by mouth nightly. acetaminophen (TYLENOL) 325 mg tablet Take 650 mg by mouth every six (6) hours as needed for Pain or Fever. 2.   Follow-up Information       Follow up With Specialties Details Why Contact Info    Your dentist  Schedule an appointment as soon as possible for a visit             3.  Return to ED if worse   4.    Discharge Medication List as of 8/12/2022 11:50 AM        START taking these medications    Details   amoxicillin 500 mg tab Take 500 mg by mouth three (3) times daily. , Normal, Disp-30 Tablet, R-0      ibuprofen (MOTRIN) 600 mg tablet Take 1 Tablet by mouth every six (6) hours as needed for Pain., Normal, Disp-20 Tablet, R-0           CONTINUE these medications which have NOT CHANGED    Details   traZODone (DESYREL) 50 mg tablet Historical Med      albuterol (PROVENTIL HFA, VENTOLIN HFA, PROAIR HFA) 90 mcg/actuation inhaler Take 2 Puffs by inhalation every four (4) hours as needed for Wheezing., Normal, Disp-1 Inhaler, R-2      QUEtiapine (SEROquel) 100 mg tablet Take 50 mg by mouth daily. , Historical Med      divalproex DR (DEPAKOTE) 500 mg tablet Take  by mouth three (3) times daily. , Historical Med      OLANZapine (ZYPREXA) 10 mg tablet Take 15 mg by mouth nightly., Historical Med      acetaminophen (TYLENOL) 325 mg tablet Take 650 mg by mouth every six (6) hours as needed for Pain or Fever., Historical Med               Diagnosis/Clinical Impression     Clinical Impression:   1. Dentalgia        Attestations: Loulou LAWRENCE NP, am the primary clinician of record. Please note that this dictation was completed with [employer], the [employer] voice recognition software. Quite often unanticipated grammatical, syntax, homophones, and other interpretive errors are inadvertently transcribed by the computer software. Please disregard these errors. Please excuse any errors that have escaped final proofreading. Thank you.

## 2023-05-23 ENCOUNTER — HOSPITAL ENCOUNTER (OUTPATIENT)
Facility: HOSPITAL | Age: 28
Discharge: HOME | End: 2023-05-26
Payer: MEDICAID

## 2023-05-23 DIAGNOSIS — Z11.1 SCREENING EXAMINATION FOR PULMONARY TUBERCULOSIS: ICD-10-CM

## 2023-05-23 PROCEDURE — 71046 X-RAY EXAM CHEST 2 VIEWS: CPT

## 2023-05-23 RX ORDER — AMOXICILLIN 500 MG/1
500 TABLET, FILM COATED ORAL 3 TIMES DAILY
COMMUNITY
Start: 2022-08-12

## 2023-05-23 RX ORDER — DIVALPROEX SODIUM 500 MG/1
TABLET, DELAYED RELEASE ORAL 3 TIMES DAILY
COMMUNITY

## 2023-05-23 RX ORDER — QUETIAPINE FUMARATE 100 MG/1
50 TABLET, FILM COATED ORAL DAILY
COMMUNITY

## 2023-05-23 RX ORDER — ALBUTEROL SULFATE 90 UG/1
2 AEROSOL, METERED RESPIRATORY (INHALATION) EVERY 4 HOURS PRN
COMMUNITY
Start: 2021-06-01

## 2023-05-23 RX ORDER — OLANZAPINE 10 MG/1
15 TABLET ORAL NIGHTLY
COMMUNITY

## 2023-05-23 RX ORDER — ACETAMINOPHEN 325 MG/1
650 TABLET ORAL EVERY 6 HOURS PRN
COMMUNITY

## 2023-05-23 RX ORDER — TRAZODONE HYDROCHLORIDE 50 MG/1
TABLET ORAL
COMMUNITY
Start: 2021-05-21

## 2023-05-23 RX ORDER — IBUPROFEN 600 MG/1
600 TABLET ORAL EVERY 6 HOURS PRN
COMMUNITY
Start: 2022-08-12

## 2023-08-21 ENCOUNTER — HOSPITAL ENCOUNTER (EMERGENCY)
Facility: HOSPITAL | Age: 28
Discharge: HOME | End: 2023-08-21
Payer: MEDICAID

## 2023-08-21 VITALS
WEIGHT: 210 LBS | BODY MASS INDEX: 31.1 KG/M2 | DIASTOLIC BLOOD PRESSURE: 85 MMHG | HEIGHT: 69 IN | TEMPERATURE: 98 F | OXYGEN SATURATION: 98 % | HEART RATE: 102 BPM | SYSTOLIC BLOOD PRESSURE: 144 MMHG | RESPIRATION RATE: 18 BRPM

## 2023-08-21 DIAGNOSIS — J06.9 ACUTE UPPER RESPIRATORY INFECTION: ICD-10-CM

## 2023-08-21 DIAGNOSIS — H60.392 INFECTIVE OTITIS EXTERNA OF LEFT EAR: Primary | ICD-10-CM

## 2023-08-21 LAB
DEPRECATED S PYO AG THROAT QL EIA: NEGATIVE
FLUAV AG NPH QL IA: NEGATIVE
FLUBV AG NOSE QL IA: NEGATIVE
SARS-COV-2 RDRP RESP QL NAA+PROBE: NOT DETECTED

## 2023-08-21 PROCEDURE — 99283 EMERGENCY DEPT VISIT LOW MDM: CPT

## 2023-08-21 PROCEDURE — 6370000000 HC RX 637 (ALT 250 FOR IP)

## 2023-08-21 PROCEDURE — 87880 STREP A ASSAY W/OPTIC: CPT

## 2023-08-21 PROCEDURE — 87804 INFLUENZA ASSAY W/OPTIC: CPT

## 2023-08-21 PROCEDURE — 87070 CULTURE OTHR SPECIMN AEROBIC: CPT

## 2023-08-21 PROCEDURE — 87635 SARS-COV-2 COVID-19 AMP PRB: CPT

## 2023-08-21 RX ORDER — DEXTROMETHORPHAN HBR. AND GUAIFENESIN 10; 100 MG/5ML; MG/5ML
10 SOLUTION ORAL ONCE
Status: COMPLETED | OUTPATIENT
Start: 2023-08-21 | End: 2023-08-21

## 2023-08-21 RX ORDER — IBUPROFEN 600 MG/1
600 TABLET ORAL
Status: COMPLETED | OUTPATIENT
Start: 2023-08-21 | End: 2023-08-21

## 2023-08-21 RX ORDER — ACETAMINOPHEN 325 MG/1
650 TABLET ORAL
Status: COMPLETED | OUTPATIENT
Start: 2023-08-21 | End: 2023-08-21

## 2023-08-21 RX ORDER — DEXTROMETHORPHAN HBR. AND GUAIFENESIN 10; 100 MG/5ML; MG/5ML
10 SOLUTION ORAL EVERY 4 HOURS PRN
Status: DISCONTINUED | OUTPATIENT
Start: 2023-08-21 | End: 2023-08-21

## 2023-08-21 RX ADMIN — IBUPROFEN 600 MG: 600 TABLET, FILM COATED ORAL at 12:43

## 2023-08-21 RX ADMIN — GUAIFENESIN DEXTROMETHORPHAN HYDROBROMIDE ORAL SOLUTION 10 ML: 200; 20 SOLUTION ORAL at 12:51

## 2023-08-21 RX ADMIN — ACETAMINOPHEN 650 MG: 325 TABLET ORAL at 12:43

## 2023-08-21 ASSESSMENT — LIFESTYLE VARIABLES
HOW OFTEN DO YOU HAVE A DRINK CONTAINING ALCOHOL: NEVER
HOW MANY STANDARD DRINKS CONTAINING ALCOHOL DO YOU HAVE ON A TYPICAL DAY: PATIENT DOES NOT DRINK

## 2023-08-21 ASSESSMENT — PAIN - FUNCTIONAL ASSESSMENT: PAIN_FUNCTIONAL_ASSESSMENT: NONE - DENIES PAIN

## 2023-08-21 NOTE — ED TRIAGE NOTES
[facility] pt is a resident of [facility]; pt stated that he has been having a dry cough for the past few days; denies fever

## 2023-08-21 NOTE — ED PROVIDER NOTES
[facility] EMERGENCY DEPT  EMERGENCY DEPARTMENT HISTORY AND PHYSICAL EXAM      Date: 8/21/2023  Patient Name: Aditya Cherry  MRN: [id number]  [facility] [facility] 1995  Date of evaluation: 8/21/2023  Provider: Marya Kline PA-C   Note Started: 11:29 AM EDT 8/21/23    HISTORY OF PRESENT ILLNESS     Chief Complaint   Patient presents with    Cough       History Provided By: Patient    HPI: Aditya Cherry is a 29 y.o. male past medical history as listed below presents to the emergency department with a group home representative with chief complaint of cough, runny nose, sore throat and ear pain. Patient reports that his symptoms started late last week and have persisted through the weekend. States that his ear began hurting last 2 days. Group home representative states that the patient was taken to his PCP last week but was not treated with anything other than cough syrup and they did not test for any upper respiratory illnesses. PAST MEDICAL HISTORY   Past Medical History:  Past Medical History:   Diagnosis Date    Anxiety disorder     Asthma     Bipolar 1 disorder ([address])     Cerebral artery occlusion with cerebral infarction ([address])     Depression     Schizoaffective disorder ([address])     Seizures ([address])     last seizure 2001, 2016. Past Surgical History:  Past Surgical History:   Procedure Laterality Date    ORTHOPEDIC SURGERY      OTHER SURGICAL HISTORY Right 05/01/2018    Excision of Sebaceous cyst of right thigh by Dr. Jacque Thayer. Family History:  Family History   Problem Relation Age of Onset    Hypertension Mother     Hypertension Father        Social History:  Social History     Tobacco Use    Smoking status: Every Day     Packs/day: 1.00     Types: Cigarettes    Smokeless tobacco: Never   Substance Use Topics    Alcohol use: Not Currently     Alcohol/week: 1.0 - 2.0 standard drink    Drug use: Yes     Types: Marijuana Keily Govea)       Allergies:   Allergies   Allergen Reactions    Bee Venom      Other reaction(s):

## 2023-08-22 LAB
BACTERIA SPEC CULT: NORMAL
Lab: NORMAL

## 2023-08-31 ENCOUNTER — HOSPITAL ENCOUNTER (EMERGENCY)
Facility: HOSPITAL | Age: 28
Discharge: HOME | End: 2023-08-31
Payer: MEDICAID

## 2023-08-31 VITALS
TEMPERATURE: 97.8 F | SYSTOLIC BLOOD PRESSURE: 129 MMHG | BODY MASS INDEX: 27.4 KG/M2 | WEIGHT: 185 LBS | RESPIRATION RATE: 16 BRPM | HEIGHT: 69 IN | DIASTOLIC BLOOD PRESSURE: 89 MMHG | OXYGEN SATURATION: 99 % | HEART RATE: 71 BPM

## 2023-08-31 DIAGNOSIS — H10.31 ACUTE BACTERIAL CONJUNCTIVITIS OF RIGHT EYE: Primary | ICD-10-CM

## 2023-08-31 PROCEDURE — 99283 EMERGENCY DEPT VISIT LOW MDM: CPT

## 2023-08-31 RX ORDER — POLYMYXIN B SULFATE AND TRIMETHOPRIM 1; 10000 MG/ML; [USP'U]/ML
1 SOLUTION OPHTHALMIC EVERY 4 HOURS
Qty: 3 ML | Refills: 0 | Status: SHIPPED | OUTPATIENT
Start: 2023-08-31 | End: 2023-09-10

## 2023-08-31 ASSESSMENT — PAIN SCALES - GENERAL: PAINLEVEL_OUTOF10: 5

## 2023-08-31 ASSESSMENT — PAIN - FUNCTIONAL ASSESSMENT: PAIN_FUNCTIONAL_ASSESSMENT: 0-10

## 2023-08-31 ASSESSMENT — LIFESTYLE VARIABLES
HOW MANY STANDARD DRINKS CONTAINING ALCOHOL DO YOU HAVE ON A TYPICAL DAY: PATIENT DOES NOT DRINK
HOW OFTEN DO YOU HAVE A DRINK CONTAINING ALCOHOL: NEVER

## 2023-08-31 NOTE — ED PROVIDER NOTES
Allergies: Allergies   Allergen Reactions    Bee Venom      Other reaction(s): Sneezing    Coconut (Cocos Nucifera) Hives    Dog Epithelium Allergy Skin Test      Other reaction(s): Sneezing    Mushroom Extract Complex Nausea And Vomiting    Red Dye Nausea And Vomiting     Has with fruit punch or [facility]. PCP: ANTONI Gonzalez    Current Meds:   No current facility-administered medications for this encounter.      Current Outpatient Medications   Medication Sig Dispense Refill    trimethoprim-polymyxin b (POLYTRIM) 53441-7.1 UNIT/ML-% ophthalmic solution Place 1 drop into the right eye every 4 hours for 10 days 3 mL 0    acetaminophen (TYLENOL) 325 MG tablet Take 2 tablets by mouth every 6 hours as needed      albuterol sulfate HFA (PROVENTIL;VENTOLIN;PROAIR) 108 (90 Base) MCG/ACT inhaler Inhale 2 puffs into the lungs every 4 hours as needed      amoxicillin (AMOXIL) 500 MG tablet Take 1 tablet by mouth 3 times daily      divalproex (DEPAKOTE) 500 MG DR tablet Take by mouth 3 times daily      ibuprofen (ADVIL;MOTRIN) 600 MG tablet Take 1 tablet by mouth every 6 hours as needed      OLANZapine (ZYPREXA) 10 MG tablet Take 1.5 tablets by mouth nightly      QUEtiapine (SEROQUEL) 100 MG tablet Take 0.5 tablets by mouth daily      traZODone (DESYREL) 50 MG tablet ceived the following from [facility]: Outside name: traZODone (DESYREL) 50 mg tablet         Social Determinants of Health:   Social Determinants of Health     Tobacco Use: High Risk    Smoking Tobacco Use: Every Day    Smokeless Tobacco Use: Never    Passive Exposure: Not on file   Alcohol Use: Not At Risk    Frequency of Alcohol Consumption: Never    Average Number of Drinks: Patient does not drink    Frequency of Binge Drinking: Never   Financial Resource Strain: Not on file   Food Insecurity: Not on file   Transportation Needs: Not on file   Physical Activity: Not on file   Stress: Not on file   Social Connections:

## 2023-09-08 ENCOUNTER — HOSPITAL ENCOUNTER (EMERGENCY)
Facility: HOSPITAL | Age: 28
Discharge: HOME | End: 2023-09-08
Attending: EMERGENCY MEDICINE
Payer: MEDICAID

## 2023-09-08 VITALS
HEART RATE: 84 BPM | WEIGHT: 189 LBS | SYSTOLIC BLOOD PRESSURE: 131 MMHG | OXYGEN SATURATION: 96 % | BODY MASS INDEX: 27.06 KG/M2 | DIASTOLIC BLOOD PRESSURE: 84 MMHG | RESPIRATION RATE: 14 BRPM | HEIGHT: 70 IN | TEMPERATURE: 97.9 F

## 2023-09-08 DIAGNOSIS — G51.0 BELL'S PALSY: Primary | ICD-10-CM

## 2023-09-08 PROCEDURE — 99283 EMERGENCY DEPT VISIT LOW MDM: CPT

## 2023-09-08 RX ORDER — PREDNISONE 20 MG/1
40 TABLET ORAL DAILY
Qty: 10 TABLET | Refills: 0 | Status: SHIPPED | OUTPATIENT
Start: 2023-09-08 | End: 2023-09-13

## 2023-09-08 RX ORDER — CHLORHEXIDINE GLUCONATE 0.12 MG/ML
15 RINSE ORAL 2 TIMES DAILY
Qty: 420 ML | Refills: 0 | Status: SHIPPED | OUTPATIENT
Start: 2023-09-08 | End: 2023-09-22

## 2023-09-08 RX ORDER — VALACYCLOVIR HYDROCHLORIDE 500 MG/1
500 TABLET, FILM COATED ORAL 2 TIMES DAILY
Qty: 10 TABLET | Refills: 0 | Status: SHIPPED | OUTPATIENT
Start: 2023-09-08 | End: 2023-09-13

## 2023-09-08 ASSESSMENT — PAIN - FUNCTIONAL ASSESSMENT: PAIN_FUNCTIONAL_ASSESSMENT: NONE - DENIES PAIN

## 2023-09-08 ASSESSMENT — PAIN SCALES - GENERAL: PAINLEVEL_OUTOF10: 0

## 2023-09-08 NOTE — ED TRIAGE NOTES
Recent [facility] with Abx treatment, improvement. Facial swelling continued without improvement. Dental F/U recommended, has not seen dentist. Present with [facility] group home staff.

## 2023-09-08 NOTE — ED PROVIDER NOTES
EMERGENCY DEPARTMENT HISTORY AND PHYSICAL EXAM      Date: 9/8/2023  Patient Name: Ilan Michaud      History of Presenting Illness     Chief Complaint   Patient presents with    Facial Swelling       History Provided By: patient, caregiver    HPI: Ilan Michaud, 29 y.o. male with a past medical history significant for Bipolar 1, schizoaffective presents to the ED with cc of facial swelling. Pt states food falling out of L side of mouth and lip swollen. Denies SOB or throat swelling, F/C/N/V/D. No new medications or foods. There are no other complaints, changes, or physical findings at this time. PCP: Daine Saint, FNP    No current facility-administered medications for this encounter.      Current Outpatient Medications   Medication Sig Dispense Refill    predniSONE (DELTASONE) 20 MG tablet Take 2 tablets by mouth daily for 5 days 10 tablet 0    valACYclovir (VALTREX) 500 MG tablet Take 1 tablet by mouth 2 times daily for 5 days 10 tablet 0    chlorhexidine (PERIDEX) 0.12 % solution Swish and spit 15 mLs 2 times daily for 14 days 420 mL 0    trimethoprim-polymyxin b (POLYTRIM) 44607-7.1 UNIT/ML-% ophthalmic solution Place 1 drop into the right eye every 4 hours for 10 days 3 mL 0    acetaminophen (TYLENOL) 325 MG tablet Take 2 tablets by mouth every 6 hours as needed      albuterol sulfate HFA (PROVENTIL;VENTOLIN;PROAIR) 108 (90 Base) MCG/ACT inhaler Inhale 2 puffs into the lungs every 4 hours as needed      amoxicillin (AMOXIL) 500 MG tablet Take 1 tablet by mouth 3 times daily      divalproex (DEPAKOTE) 500 MG DR tablet Take by mouth 3 times daily      ibuprofen (ADVIL;MOTRIN) 600 MG tablet Take 1 tablet by mouth every 6 hours as needed      OLANZapine (ZYPREXA) 10 MG tablet Take 1.5 tablets by mouth nightly      QUEtiapine (SEROQUEL) 100 MG tablet Take 0.5 tablets by mouth daily      traZODone (DESYREL) 50 MG tablet ceived the following from [facility]: Outside name: traZODone (DESYREL)

## 2023-09-08 NOTE — DISCHARGE INSTRUCTIONS
Take the medications as prescribed. Return to the ER for any new weakness or numbness, worsened facial swelling, difficulty breathing, fevers, or any other new or concerning symptoms. Thank you! Thank you for allowing me to care for you in the emergency department. It is my goal to provide you with excellent care. If you have not received excellent quality care, please ask to speak to the nurse manager. Please fill out the survey that will come to you by mail or email since we listen to your feedback! Below you will find a list of your tests from today's visit. Should you have any questions, please do not hesitate to call the emergency department. Labs  No results found for this or any previous visit (from the past 12 hour(s)). Radiologic Studies  No orders to display     ------------------------------------------------------------------------------------------------------------  The exam and treatment you received in the Emergency Department were for an urgent problem and are not intended as complete care. It is important that you follow-up with a doctor, nurse practitioner, or physician assistant to:  (1) confirm your diagnosis,  (2) re-evaluation of changes in your illness and treatment, and  (3) for ongoing care. Please take your discharge instructions with you when you go to your follow-up appointment. If you have any problem arranging a follow-up appointment, contact the Emergency Department. If your symptoms become worse or you do not improve as expected and you are unable to reach your health care provider, please return to the Emergency Department. We are available 24 hours a day. If a prescription has been provided, please have it filled as soon as possible to prevent a delay in treatment.  If you have any questions or reservations about taking the medication due to side effects or interactions with other medications, please call your primary care provider or contact the [facility]

## 2023-11-12 ENCOUNTER — HOSPITAL ENCOUNTER (EMERGENCY)
Facility: HOSPITAL | Age: 28
Discharge: HOME | End: 2023-11-14
Attending: EMERGENCY MEDICINE | Admitting: EMERGENCY MEDICINE
Payer: MEDICAID

## 2023-11-12 DIAGNOSIS — R45.851 SUICIDAL IDEATION: Primary | ICD-10-CM

## 2023-11-12 LAB
ALBUMIN SERPL-MCNC: 4.3 G/DL (ref 3.5–5)
ALBUMIN/GLOB SERPL: 0.9 (ref 1.1–2.2)
ALP SERPL-CCNC: 66 U/L (ref 45–117)
ALT SERPL-CCNC: 30 U/L (ref 12–78)
AMPHET UR QL SCN: NEGATIVE
ANION GAP SERPL CALC-SCNC: 2 MMOL/L (ref 5–15)
APAP SERPL-MCNC: <2 UG/ML (ref 10–30)
AST SERPL-CCNC: 20 U/L (ref 15–37)
BARBITURATES UR QL SCN: NEGATIVE
BASOPHILS # BLD: 0 K/UL (ref 0–0.1)
BASOPHILS NFR BLD: 0 % (ref 0–1)
BENZODIAZ UR QL: NEGATIVE
BILIRUB SERPL-MCNC: 1 MG/DL (ref 0.2–1)
BUN SERPL-MCNC: 21 MG/DL (ref 6–20)
BUN/CREAT SERPL: 18 (ref 12–20)
CALCIUM SERPL-MCNC: 9.2 MG/DL (ref 8.5–10.1)
CANNABINOIDS UR QL SCN: NEGATIVE
CHLORIDE SERPL-SCNC: 103 MMOL/L (ref 97–108)
CO2 SERPL-SCNC: 30 MMOL/L (ref 21–32)
COCAINE UR QL SCN: NEGATIVE
COMMENT:: NORMAL
CREAT SERPL-MCNC: 1.17 MG/DL (ref 0.7–1.3)
DIFFERENTIAL METHOD BLD: ABNORMAL
EKG ATRIAL RATE: 114 BPM
EKG DIAGNOSIS: NORMAL
EKG P AXIS: 56 DEGREES
EKG P-R INTERVAL: 116 MS
EKG Q-T INTERVAL: 300 MS
EKG QRS DURATION: 90 MS
EKG QTC CALCULATION (BAZETT): 413 MS
EKG R AXIS: 53 DEGREES
EKG T AXIS: -38 DEGREES
EKG VENTRICULAR RATE: 114 BPM
EOSINOPHIL # BLD: 0 K/UL (ref 0–0.4)
EOSINOPHIL NFR BLD: 0 % (ref 0–7)
ERYTHROCYTE [DISTWIDTH] IN BLOOD BY AUTOMATED COUNT: 12.5 % (ref 11.5–14.5)
ETHANOL SERPL-MCNC: <10 MG/DL (ref 0–0.08)
GLOBULIN SER CALC-MCNC: 4.9 G/DL (ref 2–4)
GLUCOSE SERPL-MCNC: 95 MG/DL (ref 65–100)
HCT VFR BLD AUTO: 50.1 % (ref 36.6–50.3)
HGB BLD-MCNC: 17.4 G/DL (ref 12.1–17)
IMM GRANULOCYTES # BLD AUTO: 0.1 K/UL (ref 0–0.04)
IMM GRANULOCYTES NFR BLD AUTO: 1 % (ref 0–0.5)
LYMPHOCYTES # BLD: 0.6 K/UL (ref 0.8–3.5)
LYMPHOCYTES NFR BLD: 6 % (ref 12–49)
Lab: NORMAL
MCH RBC QN AUTO: 30.8 PG (ref 26–34)
MCHC RBC AUTO-ENTMCNC: 34.7 G/DL (ref 30–36.5)
MCV RBC AUTO: 88.7 FL (ref 80–99)
METHADONE UR QL: NEGATIVE
MONOCYTES # BLD: 0.4 K/UL (ref 0–1)
MONOCYTES NFR BLD: 4 % (ref 5–13)
NEUTS SEG # BLD: 8.2 K/UL (ref 1.8–8)
NEUTS SEG NFR BLD: 89 % (ref 32–75)
NRBC # BLD: 0 K/UL (ref 0–0.01)
NRBC BLD-RTO: 0 PER 100 WBC
OPIATES UR QL: NEGATIVE
PCP UR QL: NEGATIVE
PLATELET # BLD AUTO: 140 K/UL (ref 150–400)
PMV BLD AUTO: 12.2 FL (ref 8.9–12.9)
POTASSIUM SERPL-SCNC: 3.8 MMOL/L (ref 3.5–5.1)
PROT SERPL-MCNC: 9.2 G/DL (ref 6.4–8.2)
RBC # BLD AUTO: 5.65 M/UL (ref 4.1–5.7)
RBC MORPH BLD: ABNORMAL
SALICYLATES SERPL-MCNC: <1.7 MG/DL (ref 2.8–20)
SARS-COV-2 RNA RESP QL NAA+PROBE: NOT DETECTED
SODIUM SERPL-SCNC: 135 MMOL/L (ref 136–145)
SOURCE: NORMAL
SPECIMEN HOLD: NORMAL
SPECIMEN HOLD: NORMAL
WBC # BLD AUTO: 9.3 K/UL (ref 4.1–11.1)

## 2023-11-12 PROCEDURE — 99285 EMERGENCY DEPT VISIT HI MDM: CPT

## 2023-11-12 PROCEDURE — 82077 ASSAY SPEC XCP UR&BREATH IA: CPT

## 2023-11-12 PROCEDURE — 80053 COMPREHEN METABOLIC PANEL: CPT

## 2023-11-12 PROCEDURE — 80307 DRUG TEST PRSMV CHEM ANLYZR: CPT

## 2023-11-12 PROCEDURE — 80179 DRUG ASSAY SALICYLATE: CPT

## 2023-11-12 PROCEDURE — 85025 COMPLETE CBC W/AUTO DIFF WBC: CPT

## 2023-11-12 PROCEDURE — 90791 PSYCH DIAGNOSTIC EVALUATION: CPT

## 2023-11-12 PROCEDURE — 36415 COLL VENOUS BLD VENIPUNCTURE: CPT

## 2023-11-12 PROCEDURE — 80143 DRUG ASSAY ACETAMINOPHEN: CPT

## 2023-11-12 PROCEDURE — 87635 SARS-COV-2 COVID-19 AMP PRB: CPT

## 2023-11-12 ASSESSMENT — ENCOUNTER SYMPTOMS
NAUSEA: 0
VOMITING: 0
SHORTNESS OF BREATH: 0
DIARRHEA: 0
CONSTIPATION: 0

## 2023-11-12 ASSESSMENT — PAIN - FUNCTIONAL ASSESSMENT: PAIN_FUNCTIONAL_ASSESSMENT: NONE - DENIES PAIN

## 2023-11-12 NOTE — ED NOTES
Pt resting on stretcher resp even and non labored pt given a snack and water.      Jeb Juarez RN  11/12/23 0917

## 2023-11-12 NOTE — BSMART NOTE
Comprehensive Assessment Form Part 1      Section I - Disposition    Unspecified Depressive Disorder  Bipolar Disorder per Hx  Schizophrenia Disorder per Hx  ADHD per Hx  OCD per Hx    Past Medical History:   Diagnosis Date    Anxiety disorder     Asthma     Bipolar 1 disorder ([address])     Cerebral artery occlusion with cerebral infarction ([address])     Depression     Schizoaffective disorder ([address])     Seizures ([address])     last seizure 2016. The Medical Doctor to Psychiatrist conference was not completed. The Medical Doctor is in agreement with Psychiatrist disposition. The plan is for voluntary psychiatric hospitalization. The ED Medical Provider consulted was Jamila Hurd. The admitting Psychiatrist will be FELY. The admitting Diagnosis is Unspecified Depressive Disorder. The Payor source is [employer]. This writer reviewed the [address] in nursing flowsheet and the risk level assigned is high risk. Based on this assessment, the risk of suicide is high risk. Section II - Integrated Summary  Summary:      30 yo male arrived to the ED via police reporting SI. Pt consented to complete his assessment, remained within LOS during the assessment and was able to confirm his name and . This assessment ws completed face to face. Pt endorses current SI w/ a plan to cut his wrists and his throat. Pt reports a previous suicide attempt in  where he attempted to cut his throat using a knife, but states his father stopped him at that time. Pt further explains he ran away from his group home earlier today w/ the intention of finding something to cut himself w/.  Pt denies HI/VH/AH; no evidence of psychosis or delusional thoughts at this time. When asked about recent stressors, pt reports he has been struggling w/ the grief from the passing of his grandmother 2 years ago. Pt notes decreased appetite and difficulty sleeping over the last few months.      Pt

## 2023-11-13 PROCEDURE — 6370000000 HC RX 637 (ALT 250 FOR IP): Performed by: STUDENT IN AN ORGANIZED HEALTH CARE EDUCATION/TRAINING PROGRAM

## 2023-11-13 PROCEDURE — 6370000000 HC RX 637 (ALT 250 FOR IP): Performed by: EMERGENCY MEDICINE

## 2023-11-13 PROCEDURE — 6370000000 HC RX 637 (ALT 250 FOR IP)

## 2023-11-13 RX ORDER — OLANZAPINE 5 MG/1
15 TABLET ORAL ONCE
Status: COMPLETED | OUTPATIENT
Start: 2023-11-13 | End: 2023-11-13

## 2023-11-13 RX ORDER — QUETIAPINE FUMARATE 100 MG/1
300 TABLET, FILM COATED ORAL ONCE
Status: COMPLETED | OUTPATIENT
Start: 2023-11-13 | End: 2023-11-13

## 2023-11-13 RX ORDER — QUETIAPINE FUMARATE 25 MG/1
50 TABLET, FILM COATED ORAL DAILY
Status: DISCONTINUED | OUTPATIENT
Start: 2023-11-13 | End: 2023-11-14 | Stop reason: HOSPADM

## 2023-11-13 RX ORDER — DIVALPROEX SODIUM 500 MG/1
500 TABLET, EXTENDED RELEASE ORAL 3 TIMES DAILY
Status: DISCONTINUED | OUTPATIENT
Start: 2023-11-13 | End: 2023-11-14 | Stop reason: HOSPADM

## 2023-11-13 RX ORDER — DIVALPROEX SODIUM 500 MG/1
500 TABLET, DELAYED RELEASE ORAL ONCE
Status: COMPLETED | OUTPATIENT
Start: 2023-11-13 | End: 2023-11-13

## 2023-11-13 RX ORDER — OLANZAPINE 5 MG/1
15 TABLET, ORALLY DISINTEGRATING ORAL NIGHTLY
Status: DISCONTINUED | OUTPATIENT
Start: 2023-11-13 | End: 2023-11-14 | Stop reason: HOSPADM

## 2023-11-13 RX ORDER — ACETAMINOPHEN 500 MG
1000 TABLET ORAL ONCE
Status: COMPLETED | OUTPATIENT
Start: 2023-11-13 | End: 2023-11-13

## 2023-11-13 RX ADMIN — OLANZAPINE 15 MG: 5 TABLET, ORALLY DISINTEGRATING ORAL at 21:43

## 2023-11-13 RX ADMIN — DIVALPROEX SODIUM 500 MG: 500 TABLET, DELAYED RELEASE ORAL at 04:14

## 2023-11-13 RX ADMIN — ACETAMINOPHEN 1000 MG: 500 TABLET ORAL at 21:11

## 2023-11-13 RX ADMIN — DIVALPROEX SODIUM 500 MG: 500 TABLET, EXTENDED RELEASE ORAL at 21:52

## 2023-11-13 RX ADMIN — OLANZAPINE 15 MG: 5 TABLET, FILM COATED ORAL at 04:13

## 2023-11-13 RX ADMIN — QUETIAPINE FUMARATE 300 MG: 100 TABLET ORAL at 04:14

## 2023-11-13 RX ADMIN — DIVALPROEX SODIUM 500 MG: 500 TABLET, EXTENDED RELEASE ORAL at 16:40

## 2023-11-13 ASSESSMENT — PAIN DESCRIPTION - DESCRIPTORS: DESCRIPTORS: ACHING

## 2023-11-13 ASSESSMENT — PAIN SCALES - GENERAL: PAINLEVEL_OUTOF10: 5

## 2023-11-13 ASSESSMENT — PAIN DESCRIPTION - LOCATION: LOCATION: HEAD

## 2023-11-13 ASSESSMENT — PAIN DESCRIPTION - ORIENTATION: ORIENTATION: POSTERIOR
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
## 2023-11-13 NOTE — BSMART NOTE
ADULT VOLUNTARY BED SEARCH STARTED/RESUMED AT 11/12/2023:    Flora Garcia ([address] [facility],[address], [facility]): contacted at 9:08pm spoke with Royce Nolan reported patient has been declined due to acuity. [facility]: contacted at 9:30pm spoke with Olivia reported fax clinicals. Olivia contacted BSMART at 10:48pm reporting patient was declined due to acuity. [facility] (WILDER Kelly, BAMBI, Anand, [facility]): contacted at 9:35pm spoke with Reji Calhoun reported fax clinicals    EDUARDO TIPTON: contacted at 9:40pm spoke with Amy Lock reported fax clinicals    Ruben Chand: contacted at 9:50pm spoke with Morey Severin reported fax clinicals    [facility]: contacted at 9:58pm spoke with Modesto Anderson reported fax clinicals    Patient is requesting to stay within an hour from [facility] at this time.     Liberty Husain, MSW, LCSW
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
## 2023-11-13 NOTE — CONSULTS
[address] CONSULT NOTE:    Name: Paula Olvera  MR#: [id number]  : 1995  ACCOUNT#: [de-identified]  ADMIT DATE: 2023    REASON FOR CONSULT: suicidal ideation    HISTORY OF PRESENTING COMPLAINT:  Paula Olvera is a 29 y.o. male who is currently seen in the ED at [facility]. He initially presented to the ED accompanied by police after leaving his group home. Patient reports suicidal ideation with plan to cut his wrists and/or throat. Per patient, his group home refused to get him help so he \"ran away to get help from the police\". He denies HI, substance or alcohol use, any other concerns at this time. He denies any prior suicide attempts. He cites his living situation as a trigger for depression/SI. PAST PSYCHIATRIC HISTORY: He has a lengthy history of psychiatric hospitalizations. He carries diagnoses of ADHD, bipolar disorder and schizophrenia. He reports compliance with psychotropic medications. SUBSTANCE ABUSE HISTORY: Denies/ UDS (-)    PSYCHOSOCIAL HISTORY: He lives in a group home in [facility]. He states, \"I don't like it there\". MENTAL STATUS EXAM:   Paula Olvera is a 29 y.o. White (non-) male who appears his stated age. He is calm and cooperative with assessment questions. He makes fair eye contact. He presents as childlike. No psychomotor agitation/retardation is observed. His speech is normal in rate, tone, and volume. His self-reported mood is \"depressed\". His affect is congruent with mood and situation. He denies auditory and visual hallucinations. No paranoia or delusions are elicited with assessment. His thought processes are linear and goal-directed. He denies suicidal and homicidal ideation. He is alert and oriented X 4. His memory appears intact as evidenced by conversation/answers to my questions. Insight and judgment are limited/poor.     DIAGNOSTIC IMPRESSION: Bipolar disorder, current episode depressed    ASSESSMENT/PLAN: Inpatient

## 2023-11-13 NOTE — BSMART NOTE
This writer rounded on patient. Patient agreed to talk with this writer and was informed of counselor's role. The patient's appearance shows poor hygiene. The patient's behavior shows poor eye contact. The patient is oriented to time, place, person and situation. The patient's speech is slowed and is soft. The patient's mood  is withdrawn. The range of affect is flat. The patient's thought content  demonstrates no evidence of impairment. The thought process shows no evidence of impairment. The patient's perception shows no evidence of impairment. The patient's memory shows no evidence of impairment. The patient's appetite shows no evidence of impairment. The patient's sleep has evidence of hypersomnia. The patient's insight shows no evidence of impairment. The patient's judgement shows no evidence of impairment. Patient endorses suicidal and/or homicidal ideation. The plan is complete a semi-local bed search- up to 1 hour outside [facility]. PACU

## 2023-11-13 NOTE — ED NOTES
Report received from Albina Miller, [address] [facility], [address], [place]  11/12/23 2029

## 2023-11-14 VITALS
RESPIRATION RATE: 16 BRPM | HEART RATE: 83 BPM | SYSTOLIC BLOOD PRESSURE: 138 MMHG | WEIGHT: 194 LBS | DIASTOLIC BLOOD PRESSURE: 84 MMHG | BODY MASS INDEX: 26.28 KG/M2 | TEMPERATURE: 97.5 F | OXYGEN SATURATION: 95 % | HEIGHT: 72 IN

## 2023-11-14 ASSESSMENT — PAIN - FUNCTIONAL ASSESSMENT: PAIN_FUNCTIONAL_ASSESSMENT: NONE - DENIES PAIN

## 2023-11-14 NOTE — ED NOTES
I assumed care of this patient at 11pm.  Plan for [facility] [place] admission. Medically cleared. 6456F. Per chart review pt had fever last night of 100.8F. Pt re-assessed by me, temp 98.2f without meds. Last nights temp ?incorrect vs taken after pt was under blankets. Pt has no complaints at this time. Awaiting u admit.     Pt signed out at 7am to oncoming day team.      Ankur Vigil, [address] [facility], DO  11/14/23 4835

## 2023-11-14 NOTE — ED NOTES
Group home representative here to pick patient up. Patient discharged without incident.       Rohit Rome RN  11/14/23 1961

## 2023-11-14 NOTE — BSMART NOTE
This writer rounded on patient. Patient observed asleep and unable to talk with this writer or be informed of counselor's role. The patient's appearance shows no evidence of impairment. The patient's behavior unable to assess during this time. The patient is orientation unable to assess during this time. The patient's speech unable to assess during this time. The patient's mood  unable to assess during this time. The range of affect unable to assess during this time. The patient's thought content  unable to assess during this time. The thought process unable to assess during this time. The patient's perception unable to assess during this time. The patient's memory unable to assess during this time. The patient's appetite shows no evidence of impairment. The patient's sleep shows no evidence of impairment. unable to assess during this time. The patient's judgement unable to assess during this time. Patient unable to assess during this time for Suicidal and/or homicidal ideation. The plan is continue bed search .

## 2023-11-14 NOTE — BSMART NOTE
YENNY/ and \"caretaker\" [phone] [facility] for update on status of bed search. Notified him that bed search is still in process. Discussed with him that patient will be seen by psychiatric provider and [facility] liaison this morning for reassessment. GREG.O. reported he is open to safety planning patient home and agreeable to discharge, if he is cleared for discharge. Will continue to update him on disposition. BSMART liaison present during call and spoke with YENNY regarding outpatient services. Update: Per psychiatric provider, [facility] APRN-CNP, patient has been cleared for discharge from psychiatry. Met with patient. He reports feeling safe with self and ready to discharge home. Denied SI/HI to this writer. Presented with euthymic mood, bright affect. Cooperative with this writer. Spoke with CARLOTA SANCHEZ. he is agreeable to discharge and safety plan. He confirmed pt does not have access to pill bottles or weapons. Safety plan completed and documented in chart with patient. Discussed utilizing crisis line with patient, and following up with therapist through [facility], [facility], and psychiatrist through [place]. CREST referral completed. GREG.O. inquired about mobile support services through [facility] and will follow up with them. GREG.LAURA. reported patient will be picked up by one of their staff members later this afternoon, between 12-1. Charge RN and MD notified.      Alisia Santana LCSW

## 2023-11-14 NOTE — ED NOTES
Pt lying in bed w/his eyes closed, noted rise and fall of chest. Unlabored breathing, pt not in distress. Sitter at bedside.      Bibiana Wade RN  11/13/23 4416
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
## 2023-11-14 NOTE — CONSULTS
[address] [facility] CONSULT NOTE:     Name: Alison Peterson  MR#: [id number]  : 1995  ACCOUNT#: [de-identified]  ADMIT DATE: 2023     REASON FOR CONSULT: suicidal ideation    23 - Treva Boyle describes his mood as \"frustrated\" and states \"I'm ready to be discharged\". He further states he is tired of being in the ED. At this time, he denies SI/HI/AVH. He is agreeable to returning back to his group home with a safety plan in place. No medication changes were made during this visit. HISTORY OF PRESENTING COMPLAINT:  Alison Peterson is a 29 y.o. male who is currently seen in the ED at [facility]. He initially presented to the ED accompanied by police after leaving his group home. Patient reports suicidal ideation with plan to cut his wrists and/or throat. Per patient, his group home refused to get him help so he \"ran away to get help from the police\". He denies HI, substance or alcohol use, any other concerns at this time. He denies any prior suicide attempts. He cites his living situation as a trigger for depression/SI. PAST PSYCHIATRIC HISTORY: He has a lengthy history of psychiatric hospitalizations. He carries diagnoses of ADHD, bipolar disorder and schizophrenia. He reports compliance with psychotropic medications. SUBSTANCE ABUSE HISTORY: Denies/ UDS (-)     PSYCHOSOCIAL HISTORY: He lives in a group home in [place] [facility]. He states, \"I don't like it there\". MENTAL STATUS EXAM:   Alison Peterson is a 29 y.o. White (non-) male who appears his stated age. He is calm and cooperative with assessment questions. He makes fair eye contact. He presents as childlike. No psychomotor agitation/retardation is observed. His speech is normal in rate, tone, and volume. His self-reported mood is \"frustrated\". His affect is congruent with mood and situation. He denies auditory and visual hallucinations. No paranoia or delusions are elicited with assessment.  His thought processes

## 2023-11-14 NOTE — DISCHARGE INSTRUCTIONS
Routine appointments for health maintenance with a primary care provider are very important and emergency department visits are no substitute. You should review all findings and test results from your visit today with your primary care physician. We recommended that you take medications as prescribed. Return to the emergency department for any new or concerning signs/symptoms or failure to improve.

## 2023-11-14 NOTE — ED NOTES
Patient ambulatory to the restroom with sitter at this time.  Patient provided with wash cloths and raul Delgado, SUNIL  11/14/23 1000

## 2024-03-01 ENCOUNTER — HOSPITAL ENCOUNTER (EMERGENCY)
Facility: HOSPITAL | Age: 29
Discharge: HOME | End: 2024-03-04
Attending: EMERGENCY MEDICINE
Payer: MEDICAID

## 2024-03-01 DIAGNOSIS — R45.851 SUICIDAL IDEATION: ICD-10-CM

## 2024-03-01 DIAGNOSIS — F25.0 SCHIZOAFFECTIVE DISORDER, BIPOLAR TYPE (HCC): Primary | ICD-10-CM

## 2024-03-01 LAB
ALBUMIN SERPL-MCNC: 3.9 G/DL (ref 3.5–5)
ALBUMIN/GLOB SERPL: 0.9 (ref 1.1–2.2)
ALP SERPL-CCNC: 73 U/L (ref 45–117)
ALT SERPL-CCNC: 31 U/L (ref 12–78)
AMPHET UR QL SCN: NEGATIVE
ANION GAP SERPL CALC-SCNC: 5 MMOL/L (ref 5–15)
APAP SERPL-MCNC: <2 UG/ML (ref 10–30)
APPEARANCE UR: CLEAR
AST SERPL-CCNC: 13 U/L (ref 15–37)
BACTERIA URNS QL MICRO: NEGATIVE /HPF
BARBITURATES UR QL SCN: NEGATIVE
BASOPHILS # BLD: 0 K/UL (ref 0–0.1)
BASOPHILS NFR BLD: 0 % (ref 0–1)
BENZODIAZ UR QL: NEGATIVE
BILIRUB SERPL-MCNC: 0.7 MG/DL (ref 0.2–1)
BILIRUB UR QL: NEGATIVE
BUN SERPL-MCNC: 13 MG/DL (ref 6–20)
BUN/CREAT SERPL: 12 (ref 12–20)
CALCIUM SERPL-MCNC: 9.4 MG/DL (ref 8.5–10.1)
CANNABINOIDS UR QL SCN: NEGATIVE
CHLORIDE SERPL-SCNC: 106 MMOL/L (ref 97–108)
CO2 SERPL-SCNC: 25 MMOL/L (ref 21–32)
COCAINE UR QL SCN: NEGATIVE
COLOR UR: ABNORMAL
COMMENT:: NORMAL
CREAT SERPL-MCNC: 1.13 MG/DL (ref 0.7–1.3)
DIFFERENTIAL METHOD BLD: NORMAL
EOSINOPHIL # BLD: 0.1 K/UL (ref 0–0.4)
EOSINOPHIL NFR BLD: 1 % (ref 0–7)
EPITH CASTS URNS QL MICRO: ABNORMAL /LPF
ERYTHROCYTE [DISTWIDTH] IN BLOOD BY AUTOMATED COUNT: 11.9 % (ref 11.5–14.5)
ETHANOL SERPL-MCNC: <10 MG/DL (ref 0–0.08)
GLOBULIN SER CALC-MCNC: 4.3 G/DL (ref 2–4)
GLUCOSE SERPL-MCNC: 111 MG/DL (ref 65–100)
GLUCOSE UR STRIP.AUTO-MCNC: NEGATIVE MG/DL
HCT VFR BLD AUTO: 44.9 % (ref 36.6–50.3)
HGB BLD-MCNC: 15.7 G/DL (ref 12.1–17)
HGB UR QL STRIP: NEGATIVE
IMM GRANULOCYTES # BLD AUTO: 0 K/UL (ref 0–0.04)
IMM GRANULOCYTES NFR BLD AUTO: 0 % (ref 0–0.5)
KETONES UR QL STRIP.AUTO: NEGATIVE MG/DL
LEUKOCYTE ESTERASE UR QL STRIP.AUTO: NEGATIVE
LYMPHOCYTES # BLD: 2.7 K/UL (ref 0.8–3.5)
LYMPHOCYTES NFR BLD: 42 % (ref 12–49)
Lab: NORMAL
MCH RBC QN AUTO: 31.3 PG (ref 26–34)
MCHC RBC AUTO-ENTMCNC: 35 G/DL (ref 30–36.5)
MCV RBC AUTO: 89.4 FL (ref 80–99)
METHADONE UR QL: NEGATIVE
MONOCYTES # BLD: 0.6 K/UL (ref 0–1)
MONOCYTES NFR BLD: 9 % (ref 5–13)
NEUTS SEG # BLD: 3.2 K/UL (ref 1.8–8)
NEUTS SEG NFR BLD: 48 % (ref 32–75)
NITRITE UR QL STRIP.AUTO: NEGATIVE
NRBC # BLD: 0 K/UL (ref 0–0.01)
NRBC BLD-RTO: 0 PER 100 WBC
OPIATES UR QL: NEGATIVE
PCP UR QL: NEGATIVE
PH UR STRIP: 7.5 (ref 5–8)
PLATELET # BLD AUTO: 171 K/UL (ref 150–400)
PMV BLD AUTO: 12 FL (ref 8.9–12.9)
POTASSIUM SERPL-SCNC: 3.7 MMOL/L (ref 3.5–5.1)
PROT SERPL-MCNC: 8.2 G/DL (ref 6.4–8.2)
PROT UR STRIP-MCNC: 30 MG/DL
RBC # BLD AUTO: 5.02 M/UL (ref 4.1–5.7)
RBC #/AREA URNS HPF: ABNORMAL /HPF (ref 0–5)
SALICYLATES SERPL-MCNC: <1.7 MG/DL (ref 2.8–20)
SARS-COV-2 RNA RESP QL NAA+PROBE: NOT DETECTED
SODIUM SERPL-SCNC: 136 MMOL/L (ref 136–145)
SOURCE: NORMAL
SP GR UR REFRACTOMETRY: 1.02 (ref 1–1.03)
SPECIMEN HOLD: NORMAL
UROBILINOGEN UR QL STRIP.AUTO: 1 EU/DL (ref 0.2–1)
WBC # BLD AUTO: 6.5 K/UL (ref 4.1–11.1)
WBC URNS QL MICRO: ABNORMAL /HPF (ref 0–4)

## 2024-03-01 PROCEDURE — 80143 DRUG ASSAY ACETAMINOPHEN: CPT

## 2024-03-01 PROCEDURE — 80053 COMPREHEN METABOLIC PANEL: CPT

## 2024-03-01 PROCEDURE — 99285 EMERGENCY DEPT VISIT HI MDM: CPT

## 2024-03-01 PROCEDURE — 87635 SARS-COV-2 COVID-19 AMP PRB: CPT

## 2024-03-01 PROCEDURE — 81001 URINALYSIS AUTO W/SCOPE: CPT

## 2024-03-01 PROCEDURE — 36415 COLL VENOUS BLD VENIPUNCTURE: CPT

## 2024-03-01 PROCEDURE — 80307 DRUG TEST PRSMV CHEM ANLYZR: CPT

## 2024-03-01 PROCEDURE — 85025 COMPLETE CBC W/AUTO DIFF WBC: CPT

## 2024-03-01 PROCEDURE — 82077 ASSAY SPEC XCP UR&BREATH IA: CPT

## 2024-03-01 PROCEDURE — 90791 PSYCH DIAGNOSTIC EVALUATION: CPT

## 2024-03-01 PROCEDURE — 80179 DRUG ASSAY SALICYLATE: CPT

## 2024-03-01 PROCEDURE — 6370000000 HC RX 637 (ALT 250 FOR IP): Performed by: EMERGENCY MEDICINE

## 2024-03-01 PROCEDURE — 93005 ELECTROCARDIOGRAM TRACING: CPT | Performed by: EMERGENCY MEDICINE

## 2024-03-01 PROCEDURE — 80164 ASSAY DIPROPYLACETIC ACD TOT: CPT

## 2024-03-01 RX ORDER — OLANZAPINE 5 MG/1
20 TABLET, ORALLY DISINTEGRATING ORAL NIGHTLY
Status: DISCONTINUED | OUTPATIENT
Start: 2024-03-01 | End: 2024-03-04 | Stop reason: HOSPADM

## 2024-03-01 RX ORDER — DIPHENHYDRAMINE HCL 25 MG
25 CAPSULE ORAL
Status: COMPLETED | OUTPATIENT
Start: 2024-03-01 | End: 2024-03-01

## 2024-03-01 RX ORDER — QUETIAPINE 150 MG/1
300 TABLET, FILM COATED, EXTENDED RELEASE ORAL NIGHTLY
Status: DISCONTINUED | OUTPATIENT
Start: 2024-03-01 | End: 2024-03-04 | Stop reason: HOSPADM

## 2024-03-01 RX ORDER — DIVALPROEX SODIUM 500 MG/1
500 TABLET, DELAYED RELEASE ORAL EVERY 8 HOURS SCHEDULED
Status: DISCONTINUED | OUTPATIENT
Start: 2024-03-01 | End: 2024-03-04 | Stop reason: HOSPADM

## 2024-03-01 RX ORDER — OLANZAPINE 20 MG/1
20 TABLET, ORALLY DISINTEGRATING ORAL NIGHTLY
COMMUNITY
Start: 2024-02-15

## 2024-03-01 RX ORDER — PRAZOSIN HYDROCHLORIDE 2 MG/1
2 CAPSULE ORAL NIGHTLY
COMMUNITY
Start: 2024-02-15

## 2024-03-01 RX ORDER — QUETIAPINE 300 MG/1
300 TABLET, FILM COATED, EXTENDED RELEASE ORAL NIGHTLY
COMMUNITY
Start: 2024-02-15

## 2024-03-01 RX ADMIN — DIPHENHYDRAMINE HYDROCHLORIDE 25 MG: 25 CAPSULE ORAL at 23:04

## 2024-03-01 RX ADMIN — OLANZAPINE 20 MG: 5 TABLET, ORALLY DISINTEGRATING ORAL at 23:03

## 2024-03-01 RX ADMIN — DIVALPROEX SODIUM 500 MG: 500 TABLET, DELAYED RELEASE ORAL at 23:04

## 2024-03-01 RX ADMIN — QUETIAPINE FUMARATE 300 MG: 150 TABLET, EXTENDED RELEASE ORAL at 23:00

## 2024-03-01 ASSESSMENT — PAIN DESCRIPTION - ORIENTATION: ORIENTATION: POSTERIOR

## 2024-03-01 ASSESSMENT — PAIN DESCRIPTION - ONSET: ONSET: ON-GOING

## 2024-03-01 ASSESSMENT — PAIN DESCRIPTION - FREQUENCY: FREQUENCY: CONTINUOUS

## 2024-03-01 ASSESSMENT — PAIN DESCRIPTION - PAIN TYPE: TYPE: ACUTE PAIN

## 2024-03-01 ASSESSMENT — PAIN - FUNCTIONAL ASSESSMENT
PAIN_FUNCTIONAL_ASSESSMENT: 0-10
PAIN_FUNCTIONAL_ASSESSMENT: ACTIVITIES ARE NOT PREVENTED

## 2024-03-01 ASSESSMENT — PAIN DESCRIPTION - LOCATION: LOCATION: NECK

## 2024-03-01 ASSESSMENT — PAIN DESCRIPTION - DESCRIPTORS: DESCRIPTORS: ACHING;DISCOMFORT

## 2024-03-01 ASSESSMENT — PAIN SCALES - GENERAL: PAINLEVEL_OUTOF10: 7

## 2024-03-02 LAB
EKG ATRIAL RATE: 84 BPM
EKG DIAGNOSIS: NORMAL
EKG P AXIS: 56 DEGREES
EKG P-R INTERVAL: 120 MS
EKG Q-T INTERVAL: 344 MS
EKG QRS DURATION: 108 MS
EKG QTC CALCULATION (BAZETT): 406 MS
EKG R AXIS: 59 DEGREES
EKG T AXIS: -17 DEGREES
EKG VENTRICULAR RATE: 84 BPM
VALPROATE SERPL-MCNC: 93 UG/ML (ref 50–100)

## 2024-03-02 PROCEDURE — 6370000000 HC RX 637 (ALT 250 FOR IP): Performed by: EMERGENCY MEDICINE

## 2024-03-02 RX ORDER — LANOLIN ALCOHOL/MO/W.PET/CERES
3 CREAM (GRAM) TOPICAL NIGHTLY
COMMUNITY

## 2024-03-02 RX ORDER — LORAZEPAM 2 MG/1
2 TABLET ORAL DAILY PRN
COMMUNITY

## 2024-03-02 RX ORDER — HYDROCODONE BITARTRATE AND ACETAMINOPHEN 7.5; 3 MG/1; MG/1
1 TABLET ORAL
COMMUNITY

## 2024-03-02 RX ORDER — GUAIFENESIN/DEXTROMETHORPHAN 100-10MG/5
5 SYRUP ORAL 4 TIMES DAILY PRN
COMMUNITY

## 2024-03-02 RX ORDER — ACETAMINOPHEN 500 MG
1000 TABLET ORAL
Status: COMPLETED | OUTPATIENT
Start: 2024-03-02 | End: 2024-03-02

## 2024-03-02 RX ORDER — OMEPRAZOLE 20 MG/1
20 CAPSULE, DELAYED RELEASE ORAL DAILY PRN
COMMUNITY

## 2024-03-02 RX ORDER — FLUTICASONE PROPIONATE 50 MCG
1 SPRAY, SUSPENSION (ML) NASAL 2 TIMES DAILY PRN
COMMUNITY

## 2024-03-02 RX ADMIN — OLANZAPINE 20 MG: 5 TABLET, ORALLY DISINTEGRATING ORAL at 21:26

## 2024-03-02 RX ADMIN — DIVALPROEX SODIUM 500 MG: 500 TABLET, DELAYED RELEASE ORAL at 21:26

## 2024-03-02 RX ADMIN — DIVALPROEX SODIUM 500 MG: 500 TABLET, DELAYED RELEASE ORAL at 06:59

## 2024-03-02 RX ADMIN — QUETIAPINE FUMARATE 300 MG: 150 TABLET, EXTENDED RELEASE ORAL at 21:26

## 2024-03-02 RX ADMIN — ACETAMINOPHEN 1000 MG: 500 TABLET ORAL at 21:25

## 2024-03-02 RX ADMIN — DIVALPROEX SODIUM 500 MG: 500 TABLET, DELAYED RELEASE ORAL at 16:13

## 2024-03-02 ASSESSMENT — PAIN SCALES - GENERAL: PAINLEVEL_OUTOF10: 5

## 2024-03-02 ASSESSMENT — PAIN DESCRIPTION - ORIENTATION: ORIENTATION: ANTERIOR

## 2024-03-02 ASSESSMENT — PAIN DESCRIPTION - DESCRIPTORS: DESCRIPTORS: ACHING

## 2024-03-02 ASSESSMENT — PAIN DESCRIPTION - LOCATION: LOCATION: HEAD

## 2024-03-02 NOTE — ED TRIAGE NOTES
Pt comes in voluntary for suicidal thoughts. Thoughts started last week. Attempted today by tying shoe strings to tip of the bed to hang self.  Denies homicidal intentions. Missed medications today: Resperiol, Benedryl, Depacot, Seroqul. States medications make him nauseous

## 2024-03-02 NOTE — PROGRESS NOTES
1 tablet by mouth nightly   QUEtiapine (SEROQUEL XR) 300 MG extended release tablet 2024    Sig: Take 1 tablet by mouth nightly   divalproex (DEPAKOTE) 500 MG DR tablet 3/1/2024 at 0900    Sig: Take 1 tablet by mouth in the morning and at bedtime   fluticasone (FLONASE) 50 MCG/ACT nasal spray     Si spray by Each Nostril route 2 times daily as needed for Rhinitis or Allergies   guaiFENesin-dextromethorphan (ROBITUSSIN DM) 100-10 MG/5ML syrup     Sig: Take 5 mLs by mouth 4 times daily as needed for Cough   ibuprofen (ADVIL;MOTRIN) 600 MG tablet Past Month    Sig: Take 1 tablet by mouth every 6 hours as needed   melatonin 3 MG TABS tablet Past Week    Sig: Take 1 tablet by mouth at bedtime   omeprazole (PRILOSEC) 20 MG delayed release capsule     Sig: Take 1 capsule by mouth daily as needed   prazosin (MINIPRESS) 2 MG capsule 2024 at 2100    Sig: Take 1 capsule by mouth nightly   traZODone (DESYREL) 50 MG tablet Past Week    Sig: ceived the following from [facility]: Outside name: traZODone (DESYREL) 50 mg tablet      Facility-Administered Medications: None     Please contact the main inpatient pharmacy with any questions or concerns at [phone] and we will direct you to the clinical pharmacist covering this patient's care while in-house.   Raya Quezada RPH

## 2024-03-02 NOTE — PLAN OF CARE
ED SIGN OUT NOTE  Care assumed at [facility] 6:58 AM EST    Patient was signed out to me by Dr. Bonilla.   Briefly, Presenting with SI and attempt to strangle himself.   Patient is awaiting BSMART.    /87   Pulse 95   Temp 97.8 °F (36.6 °C) (Oral)   Resp 18   SpO2 92%     Labs Reviewed   COMPREHENSIVE METABOLIC PANEL - Abnormal; Notable for the following components:       Result Value    Glucose 111 (*)     AST 13 (*)     Globulin 4.3 (*)     Albumin/Globulin Ratio 0.9 (*)     All other components within normal limits   ACETAMINOPHEN LEVEL - Abnormal; Notable for the following components:    Acetaminophen Level <2 (*)     All other components within normal limits   SALICYLATE LEVEL - Abnormal; Notable for the following components:    Salicylate, Serum <1.7 (*)     All other components within normal limits   URINALYSIS WITH MICROSCOPIC - Abnormal; Notable for the following components:    Protein, UA 30 (*)     All other components within normal limits   COVID ONLY ([facility])   CBC WITH AUTO DIFFERENTIAL   EXTRA TUBES HOLD   ETHANOL   URINE DRUG SCREEN   VALPROIC ACID LEVEL, TOTAL     No orders to display       ED Course as of 03/02/24 1533   Fri Mar 01, 2024   2233 D/w BSMART, plan for admission. Night meds ordered [RS]   2321 WBC, UA: 0-4 [RS]   2321 Bacteria, UA: Negative  UDS negative [RS]   2321 BUN,BUNPL: 13 [RS]   2321 Creatinine: 1.13 [RS]   2321 Salicylate, Serum(!): <1.7 [RS]   2321 Ethanol Lvl: <10 [RS]   2321 Acetaminophen Level(!): <2 [RS]   2321 Hemoglobin Quant: 15.7 [RS]   2322 Depakote level added on. Pt otherwise is medically cleared for psychiatric evaluation and management   [RS]   2325 EKG shows normal sinus rhythm with a rate of 84 no ST or T wave abnormalities to suggest ischemia or infarct.  Left-ventricular hypertrophy throughout.  Normal intervals, normal axis. [DA]   Sat Mar 02, 2024   0055 Pt declined here. Bed search to initiate now [RS]   0056 Valproic

## 2024-03-03 PROCEDURE — 6370000000 HC RX 637 (ALT 250 FOR IP): Performed by: EMERGENCY MEDICINE

## 2024-03-03 RX ORDER — IBUPROFEN 400 MG/1
600 TABLET ORAL
Status: COMPLETED | OUTPATIENT
Start: 2024-03-03 | End: 2024-03-03

## 2024-03-03 RX ORDER — DIPHENHYDRAMINE HCL 25 MG
50 CAPSULE ORAL NIGHTLY PRN
Status: DISCONTINUED | OUTPATIENT
Start: 2024-03-03 | End: 2024-03-04 | Stop reason: HOSPADM

## 2024-03-03 RX ADMIN — DIVALPROEX SODIUM 500 MG: 500 TABLET, DELAYED RELEASE ORAL at 21:13

## 2024-03-03 RX ADMIN — IBUPROFEN 600 MG: 400 TABLET, FILM COATED ORAL at 19:28

## 2024-03-03 RX ADMIN — DIVALPROEX SODIUM 500 MG: 500 TABLET, DELAYED RELEASE ORAL at 14:13

## 2024-03-03 RX ADMIN — QUETIAPINE FUMARATE 300 MG: 150 TABLET, EXTENDED RELEASE ORAL at 21:13

## 2024-03-03 RX ADMIN — OLANZAPINE 20 MG: 5 TABLET, ORALLY DISINTEGRATING ORAL at 21:12

## 2024-03-03 RX ADMIN — DIVALPROEX SODIUM 500 MG: 500 TABLET, DELAYED RELEASE ORAL at 06:16

## 2024-03-03 RX ADMIN — DIPHENHYDRAMINE HYDROCHLORIDE 50 MG: 25 CAPSULE ORAL at 21:38

## 2024-03-03 ASSESSMENT — PAIN - FUNCTIONAL ASSESSMENT: PAIN_FUNCTIONAL_ASSESSMENT: NONE - DENIES PAIN

## 2024-03-03 ASSESSMENT — PAIN SCALES - GENERAL: PAINLEVEL_OUTOF10: 0

## 2024-03-03 NOTE — ED PROVIDER NOTES
[facility] EMERGENCY DEP  EMERGENCY DEPARTMENT ENCOUNTER      Pt Name: Hasmukh Paige  MRN: [id number]  Birthdate 1995  Date of evaluation: 3/1/2024  Provider: Jt Bonilla MD    CHIEF COMPLAINT       Chief Complaint   Patient presents with    Mental Health Problem    Suicidal         HISTORY OF PRESENT ILLNESS   (Location/Symptom, Timing/Onset, Context/Setting, Quality, Duration, Modifying Factors, Severity)  Note limiting factors.   HPI    28-year-old male with past medical history significant for schizoaffective disorder, ADHD, prior cerebral infarction and seizures, admitted in November/2023 with suicidal ideation, presents to ED from his group home after patient has had recurrent suicidal ideation.  He reports it has been worsening over the past several months, but acutely worsened today, attributing this to both the death of his grandmother as well as anxiety from his housemates.  He was observed by staff attempting to tie the drawstrings of his hooded sweatshirt to a tree. He denies neck pain, dyspnea, voice change. He has been taking medications except for evening dose. When asked, he reports ongoing SI, denies HI. He is requesting evening meds and admission.    Nursing Notes were reviewed.    REVIEW OF SYSTEMS    (2-9 systems for level 4, 10 or more for level 5)     Review of Systems    Except as noted above the remainder of the review of systems was reviewed and negative.       PAST MEDICAL HISTORY     Past Medical History:   Diagnosis Date    Anxiety disorder     Asthma     Bipolar 1 disorder (HCC)     Cerebral artery occlusion with cerebral infarction (HCC)     Depression     Schizoaffective disorder (HCC)     Seizures (HCC)     last seizure 2001, 2016.           SURGICAL HISTORY       Past Surgical History:   Procedure Laterality Date    ORTHOPEDIC SURGERY      OTHER SURGICAL HISTORY Right 05/01/2018    Excision of Sebaceous cyst of right thigh by Dr. dwyer.          CURRENT MEDICATIONS   
 Assumed care of this 28-year-old male presenting from [facility] with suicidal ideation.  Patient has been evaluated by [facility] pittman who is recommending inpatient psychiatric hospitalization.  They are working on placement at this time.  Signout physician told me that he is becoming restless now that he has been here for 40 hours and that [facility] is considering temporary intermediate order.    Reassessment: On further discussion, patient is agreeable to stay at this time.  Will continue to monitor him while he is working on placement.    Reassessment: Patient remains calm and comfortable.  No issues during this shift.  Will transfer care to oncoming team with recommendation to continue monitoring him while [facility] works on placement     Prabhjot Munoz MD  03/03/24 0590    
ED SIGN OUT NOTE  Care assumed at [facility] 3:38 PM EST    Patient was signed out to me by Dr. Lam.     Patient is awaiting psychiatric bed placement.    /88   Pulse 88   Temp 98 °F (36.7 °C) (Oral)   Resp 18   SpO2 97%     Labs Reviewed   COMPREHENSIVE METABOLIC PANEL - Abnormal; Notable for the following components:       Result Value    Glucose 111 (*)     AST 13 (*)     Globulin 4.3 (*)     Albumin/Globulin Ratio 0.9 (*)     All other components within normal limits   ACETAMINOPHEN LEVEL - Abnormal; Notable for the following components:    Acetaminophen Level <2 (*)     All other components within normal limits   SALICYLATE LEVEL - Abnormal; Notable for the following components:    Salicylate, Serum <1.7 (*)     All other components within normal limits   URINALYSIS WITH MICROSCOPIC - Abnormal; Notable for the following components:    Protein, UA 30 (*)     All other components within normal limits   COVID ONLY ([facility])   CBC WITH AUTO DIFFERENTIAL   EXTRA TUBES HOLD   ETHANOL   URINE DRUG SCREEN   VALPROIC ACID LEVEL, TOTAL     No orders to display       ED Course as of 03/02/24 1538   Fri Mar 01, 2024   2233 D/w [facility], plan for admission. Night meds ordered [RS]   2321 WBC, UA: 0-4 [RS]   2321 Bacteria, UA: Negative  UDS negative [RS]   2321 BUN,BUNPL: 13 [RS]   2321 Creatinine: 1.13 [RS]   2321 Salicylate, Serum(!): <1.7 [RS]   2321 Ethanol Lvl: <10 [RS]   2321 Acetaminophen Level(!): <2 [RS]   2321 Hemoglobin Quant: 15.7 [RS]   2322 Depakote level added on. Pt otherwise is medically cleared for psychiatric evaluation and management   [RS]   2325 EKG shows normal sinus rhythm with a rate of 84 no ST or T wave abnormalities to suggest ischemia or infarct.  Left-ventricular hypertrophy throughout.  Normal intervals, normal axis. [DA]   Sat Mar 02, 2024   0055 Pt declined here. Bed search to initiate now [RS]   0056 Valproic Acid: 93  Pt is now medically cleared for 
is now medically cleared for psychiatric evaluation and management [RS]   0712 7:12 AM  Change of shift.  Care of patient signed over to Dr. Elizabeth.  Bedside handoff complete. Awaiting psych placement.    [RS]      ED Course User Index  [DA] Matt Phoenix MD  [RS] Jt Bonilla MD     Diagnosis:   1. Schizoaffective disorder, bipolar type (HCC)    2. Suicidal ideation      Disposition:   Behavioral Health Hold 03/02/2024 07:32:19 AM        Plan:     Patient presented for suicidal ideation, medically cleared by prior team and evaluated by psych for voluntary inpatient psychiatric treatment. Signed out pending placement.     No acute events overnight. Patient slept peacefully during my shift. Still awaiting placement.     Patient signed out to the oncoming physician pending inpatient psychiatric placement. Patient stable at the time of sign out.     DO Lucina James Marissa, DO  03/03/24 0715

## 2024-03-03 NOTE — BH NOTE
Department of Psychiatry  Attending Consult Note        Reason for Consult:  SI  Requesting Physician:  Mio    IDENTIFYING DATA:          The patient is a 28 y.o. male with a medical history of   Past Medical History:   Diagnosis Date    Anxiety disorder     Asthma     Bipolar 1 disorder (HCC)     Cerebral artery occlusion with cerebral infarction (HCC)     Depression     Schizoaffective disorder (HCC)     Seizures (HCC)     last seizure 2001, 2016.        who presents with suicidal ideation.    HISTORY OF PRESENT ILLNESS:  Pt is 27 yo male group home resident with suicidal ideation. Reports he has been compliant with medications, states no current plan but continued depressed mood and SI. He has had no behavioral problems in ER.    Current Facility-Administered Medications: divalproex (DEPAKOTE) DR tablet 500 mg, 500 mg, Oral, 3 times per day  OLANZapine zydis (ZYPREXA) disintegrating tablet 20 mg, 20 mg, Oral, Nightly  QUEtiapine (SEROQUEL XR) 150 MG extended release tablet 300 mg, 300 mg, Oral, Nightly    Past Psychiatric History:  previous inpatient; per [facility] note past attempt of suicide in 2009 and [facility] in 2019 (had charges of sexual battery/assault--with minors) (see [facility] note for detailed description)    Family Psychiatric History:  unknown    Drug and Alcohol History:  denies  Urine Drug Screen: No components found for: \"IAMMENTA\", \"IBARBIT\", \"IBENZO\", \"ICOCAINE\", \"IMARTHC\", \"IOPIATES\", \"IPHENCYC\"    PAST MEDICAL HISTORY      Diagnosis Date    Anxiety disorder     Asthma     Bipolar 1 disorder (HCC)     Cerebral artery occlusion with cerebral infarction (HCC)     Depression     Schizoaffective disorder (HCC)     Seizures (HCC)     last seizure 2001, 2016.       PAST SURGICAL HISTORY      Procedure Laterality Date    ORTHOPEDIC SURGERY      OTHER SURGICAL HISTORY Right 05/01/2018    Excision of Sebaceous cyst of right thigh by Dr. dwyer.        Allergies:  Bee venom, Coconut (cocos nucifera), Dog

## 2024-03-04 VITALS
SYSTOLIC BLOOD PRESSURE: 127 MMHG | TEMPERATURE: 97.6 F | RESPIRATION RATE: 16 BRPM | DIASTOLIC BLOOD PRESSURE: 85 MMHG | OXYGEN SATURATION: 98 % | HEART RATE: 72 BPM

## 2024-03-04 PROCEDURE — 6370000000 HC RX 637 (ALT 250 FOR IP): Performed by: EMERGENCY MEDICINE

## 2024-03-04 RX ADMIN — DIVALPROEX SODIUM 500 MG: 500 TABLET, DELAYED RELEASE ORAL at 06:37

## 2024-03-04 NOTE — BSMART NOTE
ADULT VOLUNTARY BED SEARCH STARTED/RESUMED AT 3/2/2024:      [facility]: contacted at 5:07 spoke with Lilia reported at capacity    [facility] (JOHN JARAMILLO PARHAM, BAMBI, ELLI, CHU [facility]): contacted at 5:11 pm spoke with Ny reported fax clinicals.     [facility]: contacted at 5:20 pm reported  no answer    [facility]: contacted at 5:21 pm spoke with Angeles reported at capacity    [facility]: contacted at 5:23 pm reported no answer    [facility]: contacted at 5:24 pm spoke with Rabia reported at capacity    [facility]: contacted at 5:27 pm spoke with Kashmir reported  declined by the overnight psychiatrist.    [facility]: contacted at 5:25 pm  spoke with Mercedes reported fax clinicals    [facility]: contacted at 5:28 pm spoke with Hola reported exclusionary due to ID diagnosis    [facility]: contacted at 5:29 pm spoke with Meaghan reported at capacity    [facility]: contacted at 5:30 pm spoke with Carlos reported fax clinicals    [facility] ([place], [place], [place]): contacted at 5:46 pm spoke with Anette reported exclusionary due to need an IQ over 90. Patient's IQ is unknown at this time.    [facility]: contacted at 5:48 pm spoke with Mitul reported fax clinicals    [facility]: contacted at 5:50 pm no answer. Message said ID is exclusionary.    [facility]: contacted at 5:52 pm spoke with Kindra reported fax clinicals    [facility]: contacted at 5:53 pm spoke with Choco reported fax clinicals    [facility] ([facility]): contacted at 6:00 pm spoke with  reported  no private rooms available.      Naina Bernstein, [facility]  
ADULT VOLUNTARY BED SEARCH STARTED/RESUMED AT 3/3/2024:        [facility]: 0111/Jose- \"we are full\"    FREDA/ELLA, JOHN CARMONA, WILDER, LAKESHIAEAT, ELLI, CHU [facility]): 0114/Sadiq \"in for review\"      [facility]: 0116/Precious \"fax it over\"     [facility]: 0118/Estela- \"at capacity\"     [facility]: 0119/Angeles- \"completely full\"     [facility]: 0119/Milton- \"at capacity\"     [facility]: contacted by previous BSMART clinician and declined by the overnight psychiatrist.     [facility] [place]: declined by Mercedes earlier in night     [facility]UCIL[facility]AN[facility]: exclusionary due to ID diagnosis per previous documentation     [facility]AC[facility] [place]: 0122/Fide \"ID makes him exclusionary\"     [facility]: 0122-voicemail left to follow-up on faxed clinicals from previous Bsmart shift     [employer] ([place], [place], [place]): Per previous Bsmart shift: contacted at 5:46 pm spoke with Anette reported exclusionary due to need an IQ over 90. Patient's IQ is unknown at this time.     [facility]: 0124/Evangelina- \"at capacity and we have other males in out ER\"     [facility]: Message said ID is exclusionary.     [facility]: 0125/Damaris \"we didn't see a packet and we are at capacity\"     [facility]: 0135/Pat- \"we are presenting shortly\" *0225/Pat-\"declined for capacity and capability\"     [facility] ([facility]): 0128/Edwin- \"send it over\"     
ADULT VOLUNTARY BED SEARCH STARTED/RESUMED AT 3/3/2024:    MARK CESILIA ([facility], [facility], [facility], [place], [facility]): contacted at 1030 spoke with Raya Hills NP has declined from [facility] and [facility] pt d/t sexual criminal history. Nagi declined yesterday and pt is exclusionary for [facility] and [facility] d/t sexual criminal history.     [facility]: contacted at 1200 spoke with Michelle reported exclusionary due to hx of sexually inappropriate behaviors.    [facility] (JOHN JARAMILLO, WILDER, [facility], [facility]): contacted at 1202 spoke with Vidal reported exclusionary due to hx of sexually inappropriate behaviors.     [facility]: contacted at 1204 spoke with Linda reported declined d/t hx of CVA, ID and sexually inappropriate behaviors.     [facility]: contacted at 1205 spoke with Karlee reported exclusionary due to hx of sexually inappropriate behaviors.     [facility]: contacted at 1208 spoke with Karlene reported at capacity.    [facility]: contacted at 1209 spoke with Gina reported at capacity    [facility]: per chart review, pt was declined by the overnight psychiatrist.     [facility]: per chart review, pt was declined last night per Yeni REICH: per chart review, exclusionary d/t hx of ID.     [facility] [place]: per chart review, exclusionary d/t hx of ID.     [facility]: contacted at 1210 spoke with Gina reported at capacity.    [facility] ([place], [place]): per chart review, pt is exclusionary d/t need for IQ over 90. Patient's IQ is unknown at this time.     [facility]: attempted to contact at 1214, but was unsuccessful.  Left a voicemail requesting a call back.     [facility]: per chart review, pt is exclusionary d/t hx of ID.     [facility]: contacted at 1215 spoke with Yuliya reported exclusionary due 
ADULT VOLUNTARY BED SEARCH STARTED/RESUMED AT 3/4/2024:    [facility] ([facility]): per chart review, pt has been declined from all [facility] d/t sexual criminal history.     [facility]: per chart review, pt meets exclusionary criteria d/t hx of sexually inappropriate behaviors.     [facility] (JOHN JARAMILLO, WILDER, VEROT, [facility], [facility]): per chart review, pt meets exclusionary criteria d/t hx of sexually inappropriate behaviors.     [facility]: per chart review, pt meets exclusionary criteria d/t hx of sexually inappropriate behaviors, CVA and ID.     [facility]: per chart review, pt meets exclusionary criteria d/t hx of sexually inappropriate behaviors.     [facility]: contacted at 0836 spoke with Mami reported at capacity.    [facility]: contacted at 0837 spoke with Faith reported at capacity.    [facility]: per chart review, pt was declined by the overnight psychiatrist yesterday.     [facility] [place]: per chart review, pt was declined two nights ago per Yeni VALENZUELA [facility]: per chart review, exclusionary d/t hx of ID.     [facility]: per chart review, exclusionary d/t hx of ID.      [facility]: contacted at 0839 spoke with Paz reported at capacity.    [facility] ([place], [place], [facility]): per chart review, pt is exclusionary d/t need for IQ over 90. Patient's IQ is unknown at this time.      [facility]: attempted to contact at 0830, but was unsuccessful.  Left a voicemail requesting a call back.     [facility]: per chart review, pt is exclusionary d/t hx of ID.      [facility]: per chart review, pt meets exclusionary criteria d/t hx of sexually inappropriate behaviors.     [facility]: per chart review, pt was declined yesterday morning for \"capacity and 
Attempted to shift round not but pt was sleeping with no concerns.   
Ayde, [facility] at Capacity     Romana, FREDA, patient is exclusionary     Desire Lang at capacity     Luis Antonio Gilmore, patient is exclusionary     Tho Goldstein, Documents faxed    Maggie Jiménez, Documents Faxed     MALACHI Raygozatist, Exclusionary       
BSMART Liaison Team Note     LOS: 2 days, 13 hours    Patient goal(s) for today: Patient goal(s) for today: take medications as prescribed, make needs known in an appropriate manner, explore coping skills  BSMART Liaison team focus/goals: assess needs, provide support and education, coordinate care,  provide supportive counseling    Progress note: Liaison met f/f with pt at his bedside with EKATERINA Swann. Pt resting comfortably. He presents as alert, calm, cooperative, pleasant, often smiling appropriately. Mood and affect appear euthymic. Pt engages and interacts appropriately. He says that he is \"feeling good.\" He indicates being tired of stay in the ER. He denies any SI, plan, or intent at this time. He tells liaison and NP Hue that he will not hurt himself if he leaves the ER and returns to his detention. He promises to seek help from staff, his sister, or 911, if he has any thoughts to hurt himself in the future. He particularly says that he can go to staff person, \"Oskar.\" Pt denies HI/AH/VH at this time. He does report hearing \"jumbled\" voices last night that could not be deciphered. Pt reports sleeping \"a lot better.\" Pt reports eating \"okay.\" He reports soothing strategies such as listening to music, coloring, playing video games, and writing songs. Pt says that he is amenable to participation in REACH mobile services. Liaison [facility]olanta, with regard to plan for pt to be discharged home, and for ADRIANE to be contacted. Advised Jolanta that REACH referral recommended. Liaison will continue to monitor and to support.     Update @ 1:08 PM. BSMART counselor, Jolanta, reporting that pt now recommended for BHU admission. [facility] being contacted for TDO assessment.    Barriers to Discharge: BHU Placement  Guns in the home:  Pt denies    Outpatient provider(s):  Dr. Potts and [facility]. [facility] contact is Danilo Winters #[phone]. Pt believes that he has a counselor.  Insurance 
BSMART assessment completed, and suicide risk level noted to be high risk . Primary Nurse Lakhwinder and Charge Nurse Leonor and Physician Dr. Sawyer notified. Concerns not observed.        
BSMART attempted to round on patient. The patient was asleep at this time. BSMART was advised by previous shift, should patient attempt to leave to contact Rena Ferrer to update their evaluation to TDO. Patient was assessed by Rena on 3/4/24 and agreed to voluntary with the understanding a TDO would be pursued if he attempted to leave. The patient is voluntary at this time. Bed search continues.  
Comprehensive Assessment Form Part 1      Section I - Disposition    Primary Diagnosis:  Intellectual Disability, Bi-polar disorder, Schizophrenia Disorder, ADHD, OCD  Secondary Diagnosis:     The Medical Doctor to Psychiatrist conference was notcompleted.  The Medical Doctor is in agreement with Psychiatrist disposition because of (reason) N/a.  The plan is Voluntary Hospitalization .  The on-call Psychiatrist consulted was Dr. SAMUEL.  The admitting Psychiatrist will be Dr. GEIGER.  The admitting Diagnosis is Bi-polar disorder,.  The Payor source is Not Listed .  The name of the representative was Self.      This writer reviewed the Nickerson Suicide Severity Rating Scale in nursing flowsheet and the risk level assigned is high risk.  Based on this assessment, the risk of suicide is high risk and the plan is voluntary admission .     Section II - Integrated Summary  Summary: An assessment was conducted via face to face within the hospital ER, in attendance was this writer and Hasmukh Paige. At the start of the assessment it was determined that Hasmukh was oriented x4 and willing to participate in the assessment. It should be noted that this writer after obtaining verbal permission from Hasmukh contacted his  Danilo Winters in order to collect collateral information. Hasmukh began the assessment by informing this writer that he has been experiencing an increase in depressive symptoms and overall anxiety for the past few weeks which has led him to experience passive suicidal ideation. Hasmukh reported that today, his group home was very chaotic and his peers were arguing with staff and each other which caused him high levels of anxiety and eventually a panic attack. Hasmukh stated that he was so overwhelmed he tied shoestrings around his neck and bed and attempted to hang himself. Hasmuhk conveyed to this writer that he has been struggling with depression since the loss of his grandmother 2 years ago 
Notified by Bed Access that patient has been declined at [facility], [facility] and [facility] due to . Patient agreed to statewide bed search  
Per BSMART Paxton colindres and PMHNPTaya recommendation is for discharge home at this time.  Clinician relayed concerns regarding recommendations from yesterday per Rena Ferrer and Reinier PEREA - concerns noted by liaison and PMHNP.     Spoke w/ [facility] , Danilo Singh via TC ([phone]) who is in agreement w/ plan for pt to return home today.  Danilo confirms that pt is under constant supervision while at the group home, and has no access to firearms, weapons, sharps or medications.  Danilo reports pt's next psychiatry appointment is currently scheduled for 4/9; however, he agreed to contact Dr. Potts to reschedule the appointment for a sooner date. Danilo confirms he is able to pick pt up from the ED today sometime between [id number].     1120: Consulted w/ the current ED Medical Provider, Dr. Lencho Mar who expressed concerns regarding discharge d/t recommendations per Rena Ferrer and Reinier PEREA from yesterday. Relayed information to Taya who is in agreement to meet w/ Dr. Mar and clinician at 1200 to discuss further.     1210: Attempted to contact Taya via TC 2x, but was unsuccessful - left a voicemail requesting a call back.  Per Dr. Mar he is not agreeable w/ recommendation for discharge at this time d/t safety concerns.  Clinician will attempt to coordinate doc-to-doc for further discussion.  Attempted to contact , Danilo via TC to provide an update, but was unsuccessful - left a voicemail requesting a call back.     1245: Spoke w/ Dr. Mar who states after consultation w/ Taya PEREA they are in agreement that pt is not safe to return home and the recommendation continues to be for psychiatric inpatient treatment. Dr. Mar is recommending clinician contact Rena Ferrer if pt is involuntary for admission.  Clinician met w/ pt to relay updated recommendation.  Pt expressed feelings of frustration over waiting multiple days and is no 
Pt declined by Mercedes at [facility].  
Pt reporting he no longer wants to wait for a bed and no longer feels suicidal. Writer confirmed with Danilo/ that he would be willing to accept pt back under safety plan and he stated yes. Spoke with YESSI Hills NP who declined safety plan back to home since pt had recent suicide gesture. Pt updated and he is willing to voluntarily remain in ER. Plan will be to continue bedsearch.   
Received a voicemail from ED staff stating that pt is requesting to leave again.  Per note by BSMART clinician, Nazia javed today, \"Pt reporting he no longer wants to wait for a bed and no longer feels suicidal. Writer confirmed with Danilo/ that he would be willing to accept pt back under safety plan and he stated yes. Spoke with YESSI Hills NP who declined safety plan back to home since pt had recent suicide gesture. Pt updated and he is willing to voluntarily remain in ER. Plan will be to continue bedsearch\". Clinician relayed safety concerns and PMHNP recommendations.   Pt continues to endorse active SI, but is no longer willing to remain voluntary for psychiatric hospitalization.  Clinician explained the TDO/prescreening process - pt verbalized understanding.  Consulted w/ the ED Medical Provider, Dr. Freeman who is in agreement w/ plan.      Spoke w/ Dolores crow/ Rena Ferrer to relay TDO prescreen request. Will fax clinicals for review.     1650: Spoke w/ Dolores crow/ Rena Ferrer who states pt is agreeable to remain voluntary for admission at this time; however, they will recommend TDO admission if he changes his mind. Dolores also reports that REACH will be coming out shortly to meet w/ pt.   
This writer rounded on patient.  Patient agreed to talk with this writer and was informed of counselor's role.    The patient's appearance shows no evidence of impairment.  The patient's behavior shows no evidence of impairment. The patient is oriented to time, place, person and situation.  The patient's speech shows no evidence of impairment.  The patient's mood  is depressed.  The range of affect is flat.  The patient's thought content  demonstrates no evidence of impairment.  The thought process shows no evidence of impairment.  The patient's perception demonstrated changes in the following:  auditory . The patient's memory shows no evidence of impairment.  The patient's appetite is decreased.  The patient's sleep shows no evidence of impairment. The patient shows no insight.  The patient's judgement is psychologically impaired and is cognitively impaired.  Patient reported suicidal and denied homicidal ideation.  Patient reported he would do whatever he could to harm himself and doesn't feel safe.  Psychiatric consult in but patient has not been seen yet today.  The plan is bed search.    Spoke with Danilo Winters, .  He was inquiring to see if patient will be able to safety plan and return home this evening.  Patient does not feel safe to discharge and we are awaiting psychiatry consult.      
tells liaison that he is still hearing and seeing things. He points to a figure near his bed, \"right there.\" He says that he is hearing \"a lot of voices,\" and he \"doesn't understand them.\" He denies SI/HI when asked at this time. He reports \"all the time\" since arriving to the ER. He denies appetite disturbance. Pt says that he has been upset and frustrated b/c his parents are incarcerated, (mother since 2022 and his father since 2019). Pt reports hx of psychiatric admissions, including to [facility].     Discussed pt's ways of coping. He says that at home, he uses a bouncing ball, or he listens to music with his headphones. He likes to play video games. Discussed potential ways t calm himself in the ER. Discussed potential for pt to play games on-line. Liaison will continue to monitor and support.     Barriers to Discharge: BHU placement  Guns in the home:  Pt denies.    Outpatient provider(s): Pt has been living at his [facility] for the past 3 years. He believes that he has a therapist and a psychiatric provider.   Insurance info/prescription coverage:  [facility] MED[facility]     Diagnosis: Per psychiatric consult note on 3/3/2024: \"MDD recurrent severe with psychotic features\"     Plan: BHU admission recommended at this time. Please defer to most recent psychiatric consult note for updated recommendations and disposition.  Follow up Psych Consult placed? Yes.   Psychiatrist updated? No     Participating treatment team members: Hasmukh Paige, Paxton Garrison LCSW

## 2024-03-04 NOTE — ED NOTES
7:00 AM  Patient awaiting psychiatric placement.    1:40 PM  Patient no longer agreeable to voluntary placement, will be evaluated by Rena Ferrer for TDO.     3:00 PM  Patient signed out to Dr. Munoz pending evaluation for TDO and placement.      Britta Freeman,   03/03/24 1505    
Assumed care of patient at this time. Pt resting in stretcher, eyes closed, even rise and fall of chest noted. Safety sitter at bedside.   
Assumed care of pt at this time, pt asleep in stretcher in NAD. 1:1 observation and suicide precautions in place. Will cont to monitor.   
Bedside and Verbal shift change report given to Dulce (oncoming nurse) by Katty (offgoing nurse). Report included the following information Nurse Handoff Report, Index, ED Encounter Summary, ED SBAR, and Adult Overview.    
Care assumed from Dr. Mar, 28-year-old male with a history of schizoaffective disorder, presenting with complaints of suicidal ideation, he has been holding here in the emergency department for greater than 66 hours, I have been contacted by [facility] health services, who states that the patient's group home has agreed to safety plan will advise psychiatric follow-up, return precautions given.     Nael Rodriguez MD  03/04/24 6755    
Patient changed into green gown. Belongings secured in patient belonging bags. Security called to wand patient. Awaiting their arrival.   
Patient picked up by Danilo Winters. Patient calm and cooperative. No signs of distress.   
Patient wanded by security. Belongings secured in tote 4 with labels placed on bags.   
Pt medicated per MAR, resting in stretcher at this time. Denies needs. Remains on 1:1 continuous observation.  
Pt provided dinner tray  
RN spoke to Guzman from [facility]. Went over current medications. All up to date.     Point of contact number, Danilo () [phone]  
Rounding performed, pt resting respirations unlabored in NAD.  
Spoke with Danilo Winters at [facility] [phone] he will come and  patient within the hour.   
psychiatric evaluation and management [RS]   0712 7:12 AM  Change of shift.  Care of patient signed over to Dr. Elizabeth.  Bedside handoff complete. Awaiting psych placement.    [RS]      ED Course User Index  [DA] Matt Phoenix MD  [RS] Jt Bonilla MD     Diagnosis:   1. Schizoaffective disorder, bipolar type (HCC)    2. Suicidal ideation      Disposition:   Behavioral Health Hold 03/02/2024 07:32:19 AM        Plan:     Patient presented for suicidal ideation, medically cleared by prior team and evaluated by psych for voluntary inpatient psychiatric treatment. Signed out pending placement.      No acute events overnight. Patient still pending placement.     Patient signed out to the oncoming physician pending placement. Patient stable at the time of signout.       DO Luicna James Marissa, DO  03/04/24 0600

## 2024-03-04 NOTE — DISCHARGE INSTR - COC
Continuity of Care Form    Patient Name: Hasmukh Piage   :  1995  MRN:  [id number]    Admit date:  3/1/2024  Discharge date:  ***    Code Status Order: No Order   Advance Directives:     Admitting Physician:  No admitting provider for patient encounter.  PCP: Ailin Tuttle FNP    Discharging Nurse: ***  Discharging Hospital Unit/Room#: G/HG  Discharging Unit Phone Number: ***    Emergency Contact:   Extended Emergency Contact Information  Primary Emergency Contact: Cr Larios  Home Phone: [phone]  Relation: Lay Caregiver  Secondary Emergency Contact: reji mendoza  Mobile Phone: [phone]  Relation: Girlfriend    Past Surgical History:  Past Surgical History:   Procedure Laterality Date    ORTHOPEDIC SURGERY      OTHER SURGICAL HISTORY Right 2018    Excision of Sebaceous cyst of right thigh by Dr. dwyer.        Immunization History:     There is no immunization history on file for this patient.    Active Problems:  Patient Active Problem List   Diagnosis Code    Schizophrenia (HCC) F20.9    Traumatic fracture of toenail of right foot S99.821A       Isolation/Infection:   Isolation            No Isolation          Patient Infection Status       None to display                     Nurse Assessment:  Last Vital Signs: /89   Pulse 69   Temp 98.3 °F (36.8 °C) (Oral)   Resp 16   SpO2 95%     Last documented pain score (0-10 scale): Pain Level: 0  Last Weight:   Wt Readings from Last 1 Encounters:   23 88 kg (194 lb 0.1 oz)     Mental Status:  {IP PT MENTAL STATUS:86652}    IV Access:  { BENNIE IV ACCESS:735717820}    Nursing Mobility/ADLs:  Walking   {CHP DME ADLs:652121344}  Transfer  {CHP DME ADLs:209301529}  Bathing  {CHP DME ADLs:542238507}  Dressing  {CHP DME ADLs:992172155}  Toileting  {CHP DME ADLs:540368240}  Feeding  {CHP DME ADLs:299408356}  Med Admin  {CHP DME ADLs:878983774}  Med Delivery   { BENNIE MED Delivery:866353084}    Wound Care Documentation and Therapy:

## 2024-03-04 NOTE — CONSULTS
[facility]  PSYCHIATRY CONSULT NOTE:    Name: Hasmukh Paige  MR#: [id number]  : 1995  ACCOUNT#: [id number]  ADMIT DATE: 3/1/2024    REASON FOR CONSULT: Suicide ideation    HISTORY OF PRESENTING COMPLAINT:  Hasmukh Paige is a 28 y.o. male with PMH of bipolar 1 disorder, anxiety, depression, schizoaffective disorder, seizures, and asthma.  He lives in a group home and was having worsening thoughts of suicide with a plan to overdose. He has been to the emergency department approximately 5 times for similar complaints within the last year. He has been admitted previously on inpatient psychiatric unit for help. He endorses being compliant with his prescribed home medications. He has been in the emergency department for several days due to difficult finding bed availability on inpatient behavioral health units. Upon assessment today he denies feeling acutely suicidal any longer. I saw him with the smart counselor Faraz.  Henry was able to list multiple coping skills including listening to music, talking to a friend, drawing, and a few other things he enjoys.  He was able to tell us that if he was feeling acutely suicidal again he would call 911 or tell someone and come back to the ER.  However, he has been consistently suicidal over the last several days and was seen by crisis center who strongly recommended admission to inpatient based on their observations and evaluation and if he was to change his mind and no longer feels suicidal it was recommended to place a TDO order for his safety and risk of suicide attempt if he were to be discharged     PAST PSYCHIATRIC HISTORY: Anxiety disorder, bipolar 1 disorder, depression, schizoaffective disorder    SUBSTANCE ABUSE HISTORY: Denies    PSYCHOSOCIAL HISTORY: Presently lives in a group home questionable ID?     MENTAL STATUS EXAM:   Hasmukh Paige is a 28 y.o. male who appears his/her stated age. He is cooperative with assessment questions. He makes fair

## 2024-08-14 ENCOUNTER — HOSPITAL ENCOUNTER (INPATIENT)
Facility: HOSPITAL | Age: 29
LOS: 6 days | Discharge: HOME | DRG: 750 | End: 2024-08-20
Attending: STUDENT IN AN ORGANIZED HEALTH CARE EDUCATION/TRAINING PROGRAM | Admitting: PSYCHIATRY & NEUROLOGY
Payer: MEDICAID

## 2024-08-14 DIAGNOSIS — R45.851 SUICIDAL IDEATION: Primary | ICD-10-CM

## 2024-08-14 PROBLEM — F70 IDIOPATHIC MILD INTELLECTUAL DISABILITY: Status: ACTIVE | Noted: 2024-08-14

## 2024-08-14 PROBLEM — F33.9 MAJOR DEPRESSION, RECURRENT (HCC): Status: ACTIVE | Noted: 2024-08-14

## 2024-08-14 LAB
ALBUMIN SERPL-MCNC: 4.1 G/DL (ref 3.5–5)
ALBUMIN/GLOB SERPL: 0.9 (ref 1.1–2.2)
ALP SERPL-CCNC: 64 U/L (ref 45–117)
ALT SERPL-CCNC: 24 U/L (ref 12–78)
AMPHET UR QL SCN: NEGATIVE
ANION GAP SERPL CALC-SCNC: 4 MMOL/L (ref 5–15)
APAP SERPL-MCNC: <2 UG/ML (ref 10–30)
AST SERPL W P-5'-P-CCNC: 15 U/L (ref 15–37)
BARBITURATES UR QL SCN: NEGATIVE
BASOPHILS # BLD: 0 K/UL (ref 0–0.1)
BASOPHILS NFR BLD: 0 % (ref 0–1)
BENZODIAZ UR QL: NEGATIVE
BILIRUB SERPL-MCNC: 0.5 MG/DL (ref 0.2–1)
BUN SERPL-MCNC: 10 MG/DL (ref 6–20)
BUN/CREAT SERPL: 11 (ref 12–20)
CA-I BLD-MCNC: 9.9 MG/DL (ref 8.5–10.1)
CANNABINOIDS UR QL SCN: NEGATIVE
CHLORIDE SERPL-SCNC: 105 MMOL/L (ref 97–108)
CO2 SERPL-SCNC: 30 MMOL/L (ref 21–32)
COCAINE UR QL SCN: NEGATIVE
CREAT SERPL-MCNC: 0.92 MG/DL (ref 0.7–1.3)
DATE LAST DOSE: ABNORMAL
DATE LAST DOSE: ABNORMAL
DIFFERENTIAL METHOD BLD: ABNORMAL
DOSE AMOUNT: ABNORMAL UNITS
DOSE AMOUNT: ABNORMAL UNITS
EOSINOPHIL # BLD: 0 K/UL (ref 0–0.4)
EOSINOPHIL NFR BLD: 0 % (ref 0–7)
ERYTHROCYTE [DISTWIDTH] IN BLOOD BY AUTOMATED COUNT: 11.8 % (ref 11.5–14.5)
ETHANOL SERPL-MCNC: <10 MG/DL (ref 0–0.08)
GLOBULIN SER CALC-MCNC: 4.6 G/DL (ref 2–4)
GLUCOSE SERPL-MCNC: 104 MG/DL (ref 65–100)
HCT VFR BLD AUTO: 45.5 % (ref 36.6–50.3)
HGB BLD-MCNC: 16 G/DL (ref 12.1–17)
IMM GRANULOCYTES # BLD AUTO: 0.1 K/UL (ref 0–0.04)
IMM GRANULOCYTES NFR BLD AUTO: 1 % (ref 0–0.5)
LYMPHOCYTES # BLD: 1.6 K/UL (ref 0.8–3.5)
LYMPHOCYTES NFR BLD: 28 % (ref 12–49)
Lab: NORMAL
MCH RBC QN AUTO: 30.7 PG (ref 26–34)
MCHC RBC AUTO-ENTMCNC: 35.2 G/DL (ref 30–36.5)
MCV RBC AUTO: 87.2 FL (ref 80–99)
METHADONE UR QL: NEGATIVE
MONOCYTES # BLD: 0.6 K/UL (ref 0–1)
MONOCYTES NFR BLD: 11 % (ref 5–13)
NEUTS SEG # BLD: 3.3 K/UL (ref 1.8–8)
NEUTS SEG NFR BLD: 60 % (ref 32–75)
NRBC # BLD: 0 K/UL (ref 0–0.01)
NRBC BLD-RTO: 0 PER 100 WBC
OPIATES UR QL: NEGATIVE
PCP UR QL: NEGATIVE
PLATELET # BLD AUTO: 183 K/UL (ref 150–400)
PMV BLD AUTO: 10.9 FL (ref 8.9–12.9)
POTASSIUM SERPL-SCNC: 3.8 MMOL/L (ref 3.5–5.1)
PROT SERPL-MCNC: 8.7 G/DL (ref 6.4–8.2)
RBC # BLD AUTO: 5.22 M/UL (ref 4.1–5.7)
SALICYLATES SERPL-MCNC: <1.7 MG/DL (ref 2.8–20)
SODIUM SERPL-SCNC: 139 MMOL/L (ref 136–145)
WBC # BLD AUTO: 5.6 K/UL (ref 4.1–11.1)

## 2024-08-14 PROCEDURE — 80307 DRUG TEST PRSMV CHEM ANLYZR: CPT

## 2024-08-14 PROCEDURE — 82077 ASSAY SPEC XCP UR&BREATH IA: CPT

## 2024-08-14 PROCEDURE — 80179 DRUG ASSAY SALICYLATE: CPT

## 2024-08-14 PROCEDURE — 80053 COMPREHEN METABOLIC PANEL: CPT

## 2024-08-14 PROCEDURE — 85025 COMPLETE CBC W/AUTO DIFF WBC: CPT

## 2024-08-14 PROCEDURE — 99285 EMERGENCY DEPT VISIT HI MDM: CPT

## 2024-08-14 PROCEDURE — 1240000000 HC EMOTIONAL WELLNESS R&B

## 2024-08-14 PROCEDURE — 36415 COLL VENOUS BLD VENIPUNCTURE: CPT

## 2024-08-14 PROCEDURE — 80143 DRUG ASSAY ACETAMINOPHEN: CPT

## 2024-08-14 PROCEDURE — 6370000000 HC RX 637 (ALT 250 FOR IP): Performed by: PSYCHIATRY & NEUROLOGY

## 2024-08-14 RX ORDER — HYDROXYZINE 50 MG/1
50 TABLET, FILM COATED ORAL 3 TIMES DAILY PRN
Status: DISCONTINUED | OUTPATIENT
Start: 2024-08-14 | End: 2024-08-20 | Stop reason: HOSPADM

## 2024-08-14 RX ORDER — ACETAMINOPHEN 325 MG/1
650 TABLET ORAL EVERY 4 HOURS PRN
Status: DISCONTINUED | OUTPATIENT
Start: 2024-08-14 | End: 2024-08-20 | Stop reason: HOSPADM

## 2024-08-14 RX ORDER — DIVALPROEX SODIUM 500 MG/1
500 TABLET, DELAYED RELEASE ORAL 2 TIMES DAILY
Status: DISCONTINUED | OUTPATIENT
Start: 2024-08-14 | End: 2024-08-20 | Stop reason: HOSPADM

## 2024-08-14 RX ORDER — OLANZAPINE 10 MG/1
20 TABLET ORAL NIGHTLY
Status: DISCONTINUED | OUTPATIENT
Start: 2024-08-14 | End: 2024-08-20 | Stop reason: HOSPADM

## 2024-08-14 RX ORDER — TRAZODONE HYDROCHLORIDE 50 MG/1
50 TABLET ORAL NIGHTLY PRN
Status: DISCONTINUED | OUTPATIENT
Start: 2024-08-14 | End: 2024-08-20 | Stop reason: HOSPADM

## 2024-08-14 RX ORDER — PRAZOSIN HYDROCHLORIDE 1 MG/1
2 CAPSULE ORAL NIGHTLY
Status: DISCONTINUED | OUTPATIENT
Start: 2024-08-14 | End: 2024-08-20 | Stop reason: HOSPADM

## 2024-08-14 RX ADMIN — HYDROXYZINE HYDROCHLORIDE 50 MG: 50 TABLET, FILM COATED ORAL at 21:10

## 2024-08-14 RX ADMIN — ACETAMINOPHEN 650 MG: 325 TABLET ORAL at 21:10

## 2024-08-14 RX ADMIN — PRAZOSIN HYDROCHLORIDE 2 MG: 1 CAPSULE ORAL at 21:10

## 2024-08-14 RX ADMIN — DIVALPROEX SODIUM 500 MG: 500 TABLET, DELAYED RELEASE ORAL at 21:10

## 2024-08-14 RX ADMIN — OLANZAPINE 20 MG: 10 TABLET, FILM COATED ORAL at 21:10

## 2024-08-14 ASSESSMENT — PAIN SCALES - GENERAL
PAINLEVEL_OUTOF10: 0
PAINLEVEL_OUTOF10: 8
PAINLEVEL_OUTOF10: 0

## 2024-08-14 ASSESSMENT — PAIN DESCRIPTION - DESCRIPTORS: DESCRIPTORS: ACHING

## 2024-08-14 ASSESSMENT — SLEEP AND FATIGUE QUESTIONNAIRES
DO YOU HAVE DIFFICULTY SLEEPING: NO
AVERAGE NUMBER OF SLEEP HOURS: 5
DO YOU USE A SLEEP AID: NO

## 2024-08-14 ASSESSMENT — PAIN DESCRIPTION - LOCATION: LOCATION: HEAD

## 2024-08-14 NOTE — BSMART NOTE
Called Silvestre Nelson at direct number [phone] to request prescreen was unable to reach  [facility] message through [facility] service

## 2024-08-14 NOTE — GROUP NOTE
Group Therapy Note    Date: 8/14/2024    Group Start Time: 1600  Group End Time: 1630  Group Topic: Nursing    SSR 2 BEHA [facility] ACUTE    Margi Vazquez RN        Group Therapy Note    Attendees: 2/10       Patient's Goal:  Pt not admitted at this time    Discipline Responsible: Registered Nurse      Signature:  Margi Vazquez RN

## 2024-08-14 NOTE — ED PROVIDER NOTES
traZODone 50 MG tablet  Commonly known as: DESYREL                DISCONTINUED MEDICATIONS:  Current Discharge Medication List          I am the Primary Clinician of Record. Frankie Espinosa MD (electronically signed)    (Please note that parts of this dictation were completed with voice recognition software. Quite often unanticipated grammatical, syntax, homophones, and other interpretive errors are inadvertently transcribed by the computer software. Please disregards these errors. Please excuse any errors that have escaped final proofreading.)        Frankie Espinosa MD  08/14/24 5261
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
## 2024-08-14 NOTE — BSMART NOTE
Per ER Dr Espinosa, pt is med cleared at this time.     Writer spoke with pt, he is currently endorsing SI, denies HI/AVH. Pt noted he is followed by [facility], writer offered to connect pt with [facility] and he has declined. Pt has a superficial cut to the left side of his neck and one to the left forearm. One to one is seated outside his door. Access made aware pt is voluntary and in need of a bed.

## 2024-08-14 NOTE — ED NOTES
Pt placed in green gown and security cleared pt with wand. Belongings placed in belongings bag and put in locker for room 24

## 2024-08-15 PROCEDURE — 6370000000 HC RX 637 (ALT 250 FOR IP): Performed by: PSYCHIATRY & NEUROLOGY

## 2024-08-15 PROCEDURE — 1240000000 HC EMOTIONAL WELLNESS R&B

## 2024-08-15 RX ORDER — ZIPRASIDONE HYDROCHLORIDE 20 MG/1
20 CAPSULE ORAL EVERY 12 HOURS PRN
Status: DISCONTINUED | OUTPATIENT
Start: 2024-08-15 | End: 2024-08-20 | Stop reason: HOSPADM

## 2024-08-15 RX ORDER — ZIPRASIDONE MESYLATE 20 MG/ML
20 INJECTION, POWDER, LYOPHILIZED, FOR SOLUTION INTRAMUSCULAR EVERY 12 HOURS PRN
Status: DISCONTINUED | OUTPATIENT
Start: 2024-08-15 | End: 2024-08-20 | Stop reason: HOSPADM

## 2024-08-15 RX ADMIN — PRAZOSIN HYDROCHLORIDE 2 MG: 1 CAPSULE ORAL at 20:48

## 2024-08-15 RX ADMIN — OLANZAPINE 20 MG: 10 TABLET, FILM COATED ORAL at 20:48

## 2024-08-15 RX ADMIN — DIVALPROEX SODIUM 500 MG: 500 TABLET, DELAYED RELEASE ORAL at 08:42

## 2024-08-15 RX ADMIN — HYDROXYZINE HYDROCHLORIDE 50 MG: 50 TABLET, FILM COATED ORAL at 08:42

## 2024-08-15 RX ADMIN — ZIPRASIDONE HYDROCHLORIDE 20 MG: 20 CAPSULE ORAL at 19:07

## 2024-08-15 RX ADMIN — DIVALPROEX SODIUM 500 MG: 500 TABLET, DELAYED RELEASE ORAL at 20:48

## 2024-08-15 RX ADMIN — TRAZODONE HYDROCHLORIDE 50 MG: 50 TABLET ORAL at 20:48

## 2024-08-15 NOTE — GROUP NOTE
Group Therapy Note    Date: 8/15/2024    Group Start Time: 1120  Group End Time: 1155  Group Topic: Education Group - Inpatient    SSR 2  NON ACUTE    Antonella Torres        Group Therapy Note    Facilitated group to focus on defining different types of defense mechanisms and being able to recognize examples of some defenses that was used to cope with stress and anxiety       Attendees: 5/8       Patient's Goal:  Attend group daily     Notes:  Receptive to information discussed and engaged. Was able to recognize the examples of different types of defenses and shared some of them that applied to him and  personal examples    Status After Intervention:  Improved    Participation Level: Active Listener and Interactive    Participation Quality: Appropriate, Attentive, and Sharing      Speech:  normal      Thought Process/Content: Logical      Affective Functioning: Congruent      Mood:  calm      Level of consciousness:  Attentive      Response to Learning: Able to verbalize current knowledge/experience and Progressing to goal      Endings: None Reported    Modes of Intervention: Education and Support      Discipline Responsible: Recreational Therapist      Signature:  CESIA Manzanares
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
## 2024-08-15 NOTE — H&P
Hospitalist Admission Note    NAME: Hasmukh Paige   :  1995   MRN:  [id number]     Date/Time:  8/15/2024 2:33 PM    Patient PCP: Ailin Tuttle FNP    ______________________________________________________________________  Given the patient's current clinical presentation in regards to the patient's multiple medical problems, complex decision making was performed, which includes reviewing the patient's available past medical records, laboratory results, and diagnostic studies.       My assessment of this patient's clinical condition and my plan of care is as follows.    Assessment / Plan:    Suicidal ideation with a history of schizoaffective, bipolar disorders.  Would benefit from behavioral health therapy  Continue Depakote  Continue Zyprexa  Continue Minipress    No medical reason other than psychiatric issues for his suicidal ideations.    Medical Decision Making:   I personally reviewed labs: Creatinine 0.92  I personally reviewed imaging: None  Toxic drug monitoring: None  Discussed case with: Attending physician Dr. Briscoe          Subjective:   CHIEF COMPLAINT: Suicidal ideation    REASON FOR CONSULT: Medical evaluation    HISTORY OF PRESENT ILLNESS:     Hasmukh Paige is a 29 y.o.  male with PMHx significant for anxiety disorder, bipolar disorder, schizoaffective disorder who has suicidal ideations and attempt with cutting his left wrist and neck.  He states that he has been improving.  He previously was on hypertensive medications although not taking currently.  We were asked to admit for work up and evaluation of the above problems.     Past Medical History:   Diagnosis Date    Anxiety disorder     Asthma     Bipolar 1 disorder (HCC)     Cerebral artery occlusion with cerebral infarction (HCC)     Depression     Schizoaffective disorder (HCC)     Seizures (HCC)     last seizure 2016.          Past Surgical History:   Procedure Laterality Date    ORTHOPEDIC SURGERY      OTHER SURGICAL

## 2024-08-15 NOTE — GROUP NOTE
Group Therapy Note    Date: 8/15/2024    Group Start Time: 0948  Group End Time: 1014  Group Topic: Activity    SSR 2 BEHA [facility] ACUTE    Meg Yeh        Group Therapy Note    Attendees: 3 of 10       Patient's Goal:  Patient stated goal is have a good day.  Patient participated in group activity.  Notes:  Patient  Status After Intervention:  Improved    Participation Level: Active Listener    Participation Quality: Appropriate and Attentive      Speech:  normal      Thought Process/Content: Logical      Affective Functioning: Congruent      Mood: Patient stated mood is good.      Level of consciousness:  Alert and Attentive      Response to Learning: Able to verbalize current knowledge/experience      Endings: None Reported    Modes of Intervention: Education and Activity      Discipline Responsible: Behavorial Health Tech      Signature:  Meg Yeh

## 2024-08-15 NOTE — GROUP NOTE
Group Therapy Note    Date: 8/15/2024    Group Start Time: 1300  Group End Time: 1340  Group Topic: Process Group - Inpatient    SSR 2 BEHA [facility] ACUTE    Mickie Barton MSW        Group Therapy Note: facilitator engaged the group in therapeutic discussion about emotions and the importance of managing/regulating emotions.     Attendees: 4       Patient's Goal:  To attend groups    Notes:  Pt presents calm and reports feeling \"better\" than he did earlier.     Status After Intervention:  Improved    Participation Level: Minimal    Participation Quality: Appropriate and Attentive      Speech:  normal      Thought Process/Content: Perseverating      Affective Functioning: Flat      Mood: dysphoric      Level of consciousness:  Alert and Oriented x4      Response to Learning: Able to verbalize current knowledge/experience, Able to verbalize/acknowledge new learning, and Able to retain information      Endings: None Reported    Modes of Intervention: Education, Support, and Socialization      Discipline Responsible: /Counselor      Signature:  CODY MARTINEZ
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
## 2024-08-15 NOTE — GROUP NOTE
Group Therapy Note    Date: 8/14/2024    Group Start Time: 1945  Group End Time: 2030  Group Topic: Recreational    SSR 2 BH NON ACUTE    Idalmis Wilks        Group Therapy Note    Attendees: 5/10     Recreational Therapist facilitated structured leisure skills group to introduce healthy leisure skills as positive way to cope and manage mood.        Patient's Goal:  Attend groups daily    Notes:  Attended group and listened to songs with peers.  Pt was receptive to intervention and responded to prompts from staff. Attended entire session and was attentive during group.       Status After Intervention:  Improved    Participation Level: Active Listener    Participation Quality: Appropriate      Speech:  normal      Thought Process/Content: Logical      Affective Functioning: Congruent      Mood:  calm       Level of consciousness:  Alert      Response to Learning: Progressing to goal      Endings: None Reported    Modes of Intervention: Activity      Discipline Responsible: Recreational Therapist      Signature:  CESIA Partida

## 2024-08-15 NOTE — CARE COORDINATION
08/15/24 1316   ITP   Date of Plan 08/15/24   Date of Next Review 08/22/24   Primary Diagnosis Code Idiopathic mild intellectual disability F70   Barriers to Treatment Need for psychoeducation   Strengths Incorporated in Plan Acknowledging need for assistance   Plan of Care   Long Term Goal (LTG) Stated in patient/guardian terms \"medication management\"   Short Term Goal 1   Short Term Goal 1 Client will be oriented to program and staff, and participate in assessment process   Baseline Functioning to make the individual comfortable with the environment while in the hospital   Target the individual will be able to approach staff and voice appropriate needs   Objectives Client will participate in group therapy;Client will participate in individual therapy   Intervention 1 Assess safety   Frequency daily   Measured by Staff observation;Self report   Staff Responsible Clinical staff;[facility] staff   Intervention 2 Acknowledge client strengths   Frequency daily   Measured by Self report;Staff observation   Staff Responsible Clinical staff;[facility] staff   Intervention 3 Group therapy   Frequency daily   Measured by Self report;Staff observation   Staff Responsible Clinical staff;[facility] staff   STG Goal 1 Status: Patient Appears to be  Progressing toward treatment plan goal   Short Term Goal 2   Short Term Goal 2 Client will learn and demonstrate effective coping skills   Baseline Functioning to improve the over all quality of lfie   Target the individual will be able to use positive skills to deal with daily life stressors   Objectives Client will participate in group therapy;Client will participate in individual therapy   Intervention 1 Indvidual therapy   Frequency daily   Measured by Self report;Staff observation   Staff Responsible Clinical staff;[facility] staff   Intervention 2 Indvidual therapy   Frequency daily   Measured by Self report;Staff observation   Staff Responsible Clinical staff;[facility] staff   Frequency daily   Measured by

## 2024-08-15 NOTE — GROUP NOTE
Group Therapy Note    Date: 8/15/2024    Group Start Time: 1550  Group End Time: 1635  Group Topic: Recreational    SSR 2  NON ACUTE    Antonella Torres        Group Therapy Note    Facilitated leisure skills group to reinforce positive coping and to manage mood through music, social interaction, group activities and art task       Attendees: 5/8       Patient's Goal:  Client will learn and demonstrate effective coping skills    Notes:  Pt was receptive to listening to music and a song he selected while in his journal. Interacted with peers and staff    Status After Intervention:  Improved    Participation Level: Active Listener and Interactive    Participation Quality: Appropriate and Attentive      Speech:  normal      Thought Process/Content: Logical      Affective Functioning: Congruent      Mood:  calm      Level of consciousness:  Attentive      Response to Learning: Progressing to goal      Endings: None Reported    Modes of Intervention: Socialization and Activity      Discipline Responsible: Recreational Therapist      Signature:  CESIA Manzanares

## 2024-08-16 PROCEDURE — 1240000000 HC EMOTIONAL WELLNESS R&B

## 2024-08-16 PROCEDURE — 6370000000 HC RX 637 (ALT 250 FOR IP): Performed by: PSYCHIATRY & NEUROLOGY

## 2024-08-16 RX ADMIN — OLANZAPINE 20 MG: 10 TABLET, FILM COATED ORAL at 21:02

## 2024-08-16 RX ADMIN — HYDROXYZINE HYDROCHLORIDE 50 MG: 50 TABLET, FILM COATED ORAL at 16:59

## 2024-08-16 RX ADMIN — DIVALPROEX SODIUM 500 MG: 500 TABLET, DELAYED RELEASE ORAL at 21:02

## 2024-08-16 RX ADMIN — PRAZOSIN HYDROCHLORIDE 2 MG: 1 CAPSULE ORAL at 21:02

## 2024-08-16 RX ADMIN — DIVALPROEX SODIUM 500 MG: 500 TABLET, DELAYED RELEASE ORAL at 08:14

## 2024-08-16 RX ADMIN — TRAZODONE HYDROCHLORIDE 50 MG: 50 TABLET ORAL at 21:02

## 2024-08-16 ASSESSMENT — PAIN SCALES - GENERAL: PAINLEVEL_OUTOF10: 8

## 2024-08-16 ASSESSMENT — PAIN DESCRIPTION - ORIENTATION: ORIENTATION: LEFT

## 2024-08-16 ASSESSMENT — PAIN DESCRIPTION - LOCATION: LOCATION: FLANK

## 2024-08-16 NOTE — GROUP NOTE
Group Therapy Note    Date: 8/16/2024    Group Start Time: 1545  Group End Time: 1630  Group Topic: Recreational    SSR 2  NON ACUTE    Antonella Torres        Group Therapy Note    Facilitated leisure skills group to reinforce positive coping and to manage mood through music, social interaction, group activities and art task       Attendees: 5/8       Patient's Goal:  Client will learn and demonstrate effective coping skills    Notes:  Pt was receptive to listening to music and songs he selected. Interacted with peers and staff. Declined to work on leisure task. Was in and out of group    Status After Intervention:  Improved    Participation Level: Active Listener and Interactive    Participation Quality: Appropriate      Speech:  normal      Thought Process/Content: Logical      Affective Functioning: Congruent      Mood:  calm      Level of consciousness:  Alert and Preoccupied      Response to Learning: Progressing to goal      Endings: None Reported    Modes of Intervention: Socialization and Activity      Discipline Responsible: Recreational Therapist      Signature:  CESIA Manzanares

## 2024-08-16 NOTE — GROUP NOTE
Group Therapy Note    Date: 8/16/2024    Group Start Time: 1515  Group End Time: 1545  Group Topic: Education Group - Inpatient    SSR 2  NON ACUTE    Antonella Torres        Group Therapy Note    Setting Goals/Facilitated discussion focused on “stepping up to a better you” by taking steps to improve in their well-being and identifying goals that would help to ensure follow-up       Attendees: 5/8       Patient's Goal:  Client will learn and demonstrate effective coping skills    Notes:  Receptive to information discussed and engaged. Pt shared prior to admission on a scale of 0/poor-10/good he was taking care of himself at a “3\" Pt shared \"sleep\" influences how well he take care of himself. Pt shared his goal is to \"get the proper amount of sleep, stop refusing his medication and get a tattoo instead of cutting\"       Status After Intervention:  Improved    Participation Level: Active Listener and Interactive    Participation Quality: Appropriate, Attentive, and Sharing      Speech:  normal      Thought Process/Content: Logical      Affective Functioning: Congruent      Mood:  calm      Level of consciousness:  Attentive      Response to Learning: Able to verbalize current knowledge/experience and Progressing to goal      Endings: None Reported    Modes of Intervention: Education and Support      Discipline Responsible: Recreational Therapist      Signature:  CESIA Manzanares

## 2024-08-16 NOTE — GROUP NOTE
Group Therapy Note    Date: 8/16/2024    Group Start Time: 1315  Group End Time: 1405  Group Topic: Process Group - Inpatient    SSR 2 BE[facility]    Heaven Young        Group Therapy Note  Process group was focused on Self-care. Writer provided the pts with a self-care wheel and ideas for a bad day. Writer asked \"on a scale 1-10 how would you rate your current self-care\" as an icebreaker. Writer went over physical, psychological, emotional, spiritual, personal and professional categories. Pts interacted well with one another but had to be redirected.   Attendees: 6-7       Patient's Goal:  \"to go on a date with my girlfriend\"    Notes:  pt interacted well with others and provided positive feedback. Pt was able to process and except positive feedback from others. At times pt appeared to be guarded or worried about answering the questions. Pt was hyper focused on his girlfriend. He shared that he would like to work on his anger.     Status After Intervention:  Improved    Participation Level: Active Listener and Interactive    Participation Quality: Appropriate      Speech:  normal      Thought Process/Content: Logical      Affective Functioning: Flat      Mood: anxious      Level of consciousness:  Alert and Attentive      Response to Learning: Able to verbalize current knowledge/experience, Able to retain information, and Progressing to goal      Endings: None Reported    Modes of Intervention: Socialization and Exploration      Discipline Responsible: /Counselor      Signature:  Heaven Young

## 2024-08-16 NOTE — GROUP NOTE
Group Therapy Note    Date: 8/16/2024    Group Start Time: 0920  Group End Time: 0950  Group Topic: Community Meeting    [facility]    Meg Yeh        Group Therapy Note    Attendees: 3 of 8       Patient's Goal:  Patient stated goal is to be happy.    Notes:  Patient stated Anxiety 8, Depression 7.    Status After Intervention:  Improved    Participation Level: Active Listener    Participation Quality: Appropriate and Attentive      Speech:  normal      Thought Process/Content: Logical      Affective Functioning: Congruent      Mood: Patient stated mood is good.      Level of consciousness:  Alert and Attentive      Response to Learning: Able to verbalize current knowledge/experience      Endings: None Reported    Modes of Intervention: Education      Discipline Responsible: Behavorial Health Tech      Signature:  Meg Yeh

## 2024-08-16 NOTE — GROUP NOTE
Group Therapy Note    Date: 8/16/2024    Group Start Time: 1050  Group End Time: 1110  Group Topic: Nursing    [facility]    Iva Dia RN    MEDICATION COMPLIANCE:     Group Therapy Note    Attendees: 3/8       Patient's Goal:      Notes:  Patient participated in the group activity/worksheet and group discussions  regarding the importance of medication compliance. Patient asked for staff help but was able to work through the questions and skip over the questions he did not understand and discuss as a group. Patient verbalized the importance of taking his medications as prescribed and not to cheek his medications like he did prior to coming into the hospital. Patient stated that he had a seizure due to cheeking his medications and not taking them.     Status After Intervention:  Improved    Participation Level: Active Listener and Interactive    Participation Quality: Appropriate, Attentive, Sharing, and Supportive      Speech:  normal      Thought Process/Content: Logical      Affective Functioning: Congruent      Mood: euphoric      Level of consciousness:  Alert, Oriented x4, and Attentive      Response to Learning: Able to verbalize current knowledge/experience, Capable of insight, and Progressing to goal      Endings: None Reported    Modes of Intervention: Education, Support, Socialization, and Problem-solving      Discipline Responsible: Registered Nurse      Signature:  Iva Dia RN

## 2024-08-17 PROCEDURE — 1240000000 HC EMOTIONAL WELLNESS R&B

## 2024-08-17 PROCEDURE — 6370000000 HC RX 637 (ALT 250 FOR IP): Performed by: PSYCHIATRY & NEUROLOGY

## 2024-08-17 RX ADMIN — TRAZODONE HYDROCHLORIDE 50 MG: 50 TABLET ORAL at 20:42

## 2024-08-17 RX ADMIN — DIVALPROEX SODIUM 500 MG: 500 TABLET, DELAYED RELEASE ORAL at 20:42

## 2024-08-17 RX ADMIN — ACETAMINOPHEN 650 MG: 325 TABLET ORAL at 18:22

## 2024-08-17 RX ADMIN — PRAZOSIN HYDROCHLORIDE 2 MG: 1 CAPSULE ORAL at 20:42

## 2024-08-17 RX ADMIN — DIVALPROEX SODIUM 500 MG: 500 TABLET, DELAYED RELEASE ORAL at 08:40

## 2024-08-17 RX ADMIN — OLANZAPINE 20 MG: 10 TABLET, FILM COATED ORAL at 20:43

## 2024-08-17 RX ADMIN — ACETAMINOPHEN 650 MG: 325 TABLET ORAL at 08:41

## 2024-08-17 ASSESSMENT — PAIN DESCRIPTION - ORIENTATION
ORIENTATION: LEFT
ORIENTATION: LEFT

## 2024-08-17 ASSESSMENT — PAIN DESCRIPTION - DESCRIPTORS: DESCRIPTORS: CRAMPING

## 2024-08-17 ASSESSMENT — PAIN SCALES - GENERAL
PAINLEVEL_OUTOF10: 8
PAINLEVEL_OUTOF10: 0
PAINLEVEL_OUTOF10: 6

## 2024-08-17 ASSESSMENT — PAIN DESCRIPTION - LOCATION
LOCATION: OTHER (COMMENT)
LOCATION: RIB CAGE

## 2024-08-17 NOTE — GROUP NOTE
Group Therapy Note    Date: 8/17/2024    Group Start Time: 1045  Group End Time: 1130  Group Topic: Education Group - Inpatient    [facility]    Idalmis Wilks        Group Therapy Note    Attendees: 4/10    Facilitated structured group to increase awareness of triggers, encourage pt. to explore places, people and situations that cause strong emotional responses and encourage pt to identify positive ways to manage triggers.         Patient's Goal:  Client will learn and demonstrate effective coping skills     Notes:  Attended group and accepted materials provided in group. Pt stated he was \"feeling tired sore and hungry\". Pt reports being triggered by his step father and when triggered the worse case scenario is that he will \"black out\".     Status After Intervention:  Improved    Participation Level: Active Listener    Participation Quality: Appropriate      Speech:  normal      Thought Process/Content: Logical      Affective Functioning: Congruent      Mood:  calm       Level of consciousness:  Alert      Response to Learning: Progressing to goal      Endings: None Reported    Modes of Intervention: Education      Discipline Responsible: Recreational Therapist      Signature:  CESIA Partida

## 2024-08-17 NOTE — GROUP NOTE
Group Therapy Note    Date: 8/17/2024    Group Start Time: 1515  Group End Time: 1600  Group Topic: Recreational    [facility]    Idalmis Wilks        Group Therapy Note    Attendees: 6/10     Recreational Therapist facilitated structured leisure skills group to introduce healthy leisure skills as positive way to cope and manage mood.             Patient's Goal:  Client will learn and demonstrate effective coping skills     Notes:  Attended group and listened to songs with peers.  Pt was receptive to intervention and responded to prompts from staff. Pt in and out of session.       Status After Intervention:  Improved    Participation Level: Active Listener    Participation Quality: Appropriate      Speech:  normal      Thought Process/Content: Logical      Affective Functioning: Congruent      Mood:  calm      Level of consciousness:  Alert      Response to Learning: Progressing to goal      Endings: None Reported    Modes of Intervention: Activity      Discipline Responsible: Recreational Therapist      Signature:  CESIA Partida

## 2024-08-18 PROCEDURE — 6370000000 HC RX 637 (ALT 250 FOR IP): Performed by: PSYCHIATRY & NEUROLOGY

## 2024-08-18 PROCEDURE — 1240000000 HC EMOTIONAL WELLNESS R&B

## 2024-08-18 RX ADMIN — DIVALPROEX SODIUM 500 MG: 500 TABLET, DELAYED RELEASE ORAL at 08:12

## 2024-08-18 RX ADMIN — ACETAMINOPHEN 650 MG: 325 TABLET ORAL at 16:07

## 2024-08-18 RX ADMIN — DIVALPROEX SODIUM 500 MG: 500 TABLET, DELAYED RELEASE ORAL at 21:04

## 2024-08-18 RX ADMIN — TRAZODONE HYDROCHLORIDE 50 MG: 50 TABLET ORAL at 21:04

## 2024-08-18 RX ADMIN — OLANZAPINE 20 MG: 10 TABLET, FILM COATED ORAL at 21:04

## 2024-08-18 RX ADMIN — PRAZOSIN HYDROCHLORIDE 2 MG: 1 CAPSULE ORAL at 21:04

## 2024-08-18 ASSESSMENT — PAIN DESCRIPTION - ORIENTATION: ORIENTATION: LEFT

## 2024-08-18 ASSESSMENT — PAIN DESCRIPTION - LOCATION: LOCATION: OTHER (COMMENT)

## 2024-08-18 ASSESSMENT — PAIN DESCRIPTION - DESCRIPTORS: DESCRIPTORS: SORE

## 2024-08-18 ASSESSMENT — PAIN SCALES - GENERAL: PAINLEVEL_OUTOF10: 8

## 2024-08-18 NOTE — GROUP NOTE
Group Therapy Note    Date: 8/18/2024    Group Start Time: 1500  Group End Time: 1600  Group Topic: Recreational    [facility]    Idalmis Wilks        Group Therapy Note    Attendees: 7/12     Recreational Therapist facilitated structured leisure skills group to introduce healthy leisure skills as positive way to cope and manage mood.        Patient's Goal:  Client will learn and demonstrate effective coping skills     Notes: Attended group and listened to songs with peers.  Pt was receptive to intervention and responded to prompts from staff. Attended most of the session but walked in and out of the group several times.     Status After Intervention:  Improved    Participation Level: Active Listener    Participation Quality: Appropriate      Speech:  normal      Thought Process/Content: Logical      Affective Functioning: Congruent      Mood:  calm      Level of consciousness:  Alert      Response to Learning: Progressing to goal      Endings: None Reported    Modes of Intervention: Activity      Discipline Responsible: Recreational Therapist      Signature:  CESIA Partida

## 2024-08-18 NOTE — GROUP NOTE
Group Therapy Note    Date: 8/18/2024    Group Start Time: 1045  Group End Time: 1130  Group Topic: Education Group - Inpatient    [facility]    Idalmis Wilks        Group Therapy Note    Attendees: 5/11    Facilitated structured group discussion to identify protective factors that have been valuable and protective factors that could be improved in managing stressors.         Patient's Goal:   Client will learn and demonstrate effective coping skills     Notes:  Pt attended group. Verbalized feeling \"Happy, Sad, Anxious, and stressed..I'm worried about my brother\". PT able to rate \"Protective Factors\" and identified feeling week in the areas of social support, self-esteem, and healthy thinking. Related he uses marijuana to cope with stressors. Required prompts and cues to participate.     Status After Intervention:  Unchanged    Participation Level: Active Listener    Participation Quality: Attentive      Speech:  normal      Thought Process/Content: Logical      Affective Functioning: Congruent      Mood: anxious      Level of consciousness:  Alert      Response to Learning: Progressing to goal      Endings: None Reported    Modes of Intervention: Education      Discipline Responsible: Recreational Therapist      Signature:  CESIA Partida

## 2024-08-19 PROCEDURE — 1240000000 HC EMOTIONAL WELLNESS R&B

## 2024-08-19 PROCEDURE — 6370000000 HC RX 637 (ALT 250 FOR IP): Performed by: PSYCHIATRY & NEUROLOGY

## 2024-08-19 RX ADMIN — HYDROXYZINE HYDROCHLORIDE 50 MG: 50 TABLET, FILM COATED ORAL at 08:40

## 2024-08-19 RX ADMIN — DIVALPROEX SODIUM 500 MG: 500 TABLET, DELAYED RELEASE ORAL at 08:40

## 2024-08-19 RX ADMIN — DIVALPROEX SODIUM 500 MG: 500 TABLET, DELAYED RELEASE ORAL at 20:45

## 2024-08-19 RX ADMIN — TRAZODONE HYDROCHLORIDE 50 MG: 50 TABLET ORAL at 20:44

## 2024-08-19 RX ADMIN — OLANZAPINE 20 MG: 10 TABLET, FILM COATED ORAL at 20:45

## 2024-08-19 RX ADMIN — PRAZOSIN HYDROCHLORIDE 2 MG: 1 CAPSULE ORAL at 20:45

## 2024-08-19 NOTE — GROUP NOTE
Group Therapy Note    Date: 8/19/2024    Group Start Time: 0800  Group End Time: 0830  Group Topic: Activity    [facility]    Shawna Galloway        Group Therapy Note    Attendees: 8  Activities group/// Dance exercise work out, therapy,what relax individual and  what individual enjoy doing.       Patient's Goal: Patient goal is to complete his program and go home    Notes: Patient participate in group activities listen to music, what make him feel good when he is upset is listen to music and     Status After Intervention:  Improved    Participation Level: Active Listener    Participation Quality: Sharing and Supportive      Speech:  normal      Thought Process/Content: Logical      Affective Functioning: Congruent      Mood: ***      Level of consciousness:  Alert      Response to Learning: Able to verbalize current knowledge/experience      Endings: None Reported    Modes of Intervention: Support, Socialization, Clarifying, and Activity      Discipline Responsible: Behavorial Health Tech      Signature:  Shawna Galloway

## 2024-08-19 NOTE — GROUP NOTE
Group Therapy Note    Date: 8/19/2024    Group Start Time: 0800  Group End Time: 0830  Group Topic: Community Meeting    [facility]    Vitaliy Mathew        Group Therapy Note    Attendees: 4/12         Patient's Goal:  patient stated that he does not have any goal     Notes:  patient stated he was depressed and anxious 9/10 on a scale. Patient denies any thoughts of self harm. Patient stated he did not want to talk about his thoughts of harming others     Status After Intervention:  Unchanged    Participation Level: Minimal    Participation Quality: Resistant      Speech:  normal      Thought Process/Content: Logical      Affective Functioning: Congruent      Mood: depressed      Level of consciousness:  Alert and Attentive      Response to Learning: Able to verbalize current knowledge/experience      Endings: None Reported    Modes of Intervention: Support and Limit-setting      Discipline Responsible: Behavorial Health Tech      Signature:  VITALIY MATHEW

## 2024-08-19 NOTE — GROUP NOTE
Group Therapy Note    Date: 8/19/2024    Group Start Time: 0900  Group End Time: 0920  Group Topic: Medication    [facility]    Pinky Alcantara RN        Group Therapy Note    Attendees: 5 OUT OF 12       Patient's Goal:      Notes:  MEDICATION GROUP    Status After Intervention:  Improved    Participation Level: Active Listener and Interactive    Participation Quality: Appropriate      Speech:  normal      Thought Process/Content: Logical      Affective Functioning: Congruent      Mood: anxious      Level of consciousness:  Alert      Response to Learning: Able to verbalize/acknowledge new learning      Endings: None Reported    Modes of Intervention: Education      Discipline Responsible: Registered Nurse      Signature:  Pinky Alcantara RN

## 2024-08-19 NOTE — GROUP NOTE
Group Therapy Note    Date: 8/19/2024    Group Start Time: 1245  Group End Time: 1315  Group Topic: Process Group - Inpatient    [facility]    Jo Haddad        Group Therapy Note: This writer facilitated a group where each individual was provided with a handout on \"my strengths and qualities\" and discuss their responses with their peers.     Attendees: 3       Patient's Goal:  to attend group.    Notes:  Pt able to identify that what he likes about himself is that \"I am gothic, I like black everything, I like black clothes, black nail polish, black shoes, I like the color black.\"    Status After Intervention:  Improved    Participation Level: Active Listener and Interactive    Participation Quality: Appropriate, Attentive, and Sharing      Speech:  normal      Thought Process/Content: Logical      Affective Functioning: Congruent      Mood: calm      Level of consciousness:  Alert, Oriented x4, and Attentive      Response to Learning: Able to verbalize current knowledge/experience, Able to verbalize/acknowledge new learning, Able to retain information, Capable of insight, Able to change behavior, and Progressing to goal      Endings: None Reported    Modes of Intervention: Education, Support, and Socialization      Discipline Responsible: /Counselor      Signature:  Jo Haddad

## 2024-08-20 VITALS
WEIGHT: 180 LBS | HEART RATE: 67 BPM | HEIGHT: 70 IN | DIASTOLIC BLOOD PRESSURE: 55 MMHG | SYSTOLIC BLOOD PRESSURE: 107 MMHG | OXYGEN SATURATION: 95 % | BODY MASS INDEX: 25.77 KG/M2 | RESPIRATION RATE: 20 BRPM | TEMPERATURE: 97.5 F

## 2024-08-20 PROCEDURE — 6370000000 HC RX 637 (ALT 250 FOR IP): Performed by: PSYCHIATRY & NEUROLOGY

## 2024-08-20 RX ORDER — PRAZOSIN HYDROCHLORIDE 2 MG/1
2 CAPSULE ORAL NIGHTLY
Qty: 30 CAPSULE | Refills: 0 | Status: SHIPPED | OUTPATIENT
Start: 2024-08-20

## 2024-08-20 RX ORDER — ACETAMINOPHEN 325 MG/1
650 TABLET ORAL EVERY 4 HOURS PRN
Qty: 120 TABLET | Refills: 3 | Status: SHIPPED | OUTPATIENT
Start: 2024-08-20

## 2024-08-20 RX ORDER — IBUPROFEN 600 MG/1
600 TABLET ORAL EVERY 6 HOURS PRN
Qty: 120 TABLET | Refills: 1 | Status: SHIPPED | OUTPATIENT
Start: 2024-08-20

## 2024-08-20 RX ORDER — LANOLIN ALCOHOL/MO/W.PET/CERES
3 CREAM (GRAM) TOPICAL NIGHTLY
Qty: 30 TABLET | Refills: 0 | Status: SHIPPED | OUTPATIENT
Start: 2024-08-20

## 2024-08-20 RX ORDER — QUETIAPINE 300 MG/1
300 TABLET, FILM COATED, EXTENDED RELEASE ORAL NIGHTLY
Qty: 30 TABLET | Refills: 0 | Status: SHIPPED | OUTPATIENT
Start: 2024-08-20

## 2024-08-20 RX ORDER — OLANZAPINE 20 MG/1
20 TABLET ORAL NIGHTLY
Qty: 30 TABLET | Refills: 0 | Status: SHIPPED | OUTPATIENT
Start: 2024-08-20

## 2024-08-20 RX ORDER — HYDROXYZINE 50 MG/1
50 TABLET, FILM COATED ORAL 3 TIMES DAILY PRN
Qty: 30 TABLET | Refills: 1 | Status: SHIPPED | OUTPATIENT
Start: 2024-08-20 | End: 2024-09-09

## 2024-08-20 RX ORDER — TRAZODONE HYDROCHLORIDE 50 MG/1
50 TABLET ORAL NIGHTLY PRN
Qty: 30 TABLET | Refills: 0 | Status: SHIPPED | OUTPATIENT
Start: 2024-08-20

## 2024-08-20 RX ORDER — OMEPRAZOLE 20 MG/1
20 CAPSULE, DELAYED RELEASE ORAL DAILY PRN
Qty: 30 CAPSULE | Refills: 0 | Status: SHIPPED | OUTPATIENT
Start: 2024-08-20

## 2024-08-20 RX ORDER — DIVALPROEX SODIUM 500 MG/1
500 TABLET, DELAYED RELEASE ORAL 2 TIMES DAILY
Qty: 90 TABLET | Refills: 3 | Status: SHIPPED | OUTPATIENT
Start: 2024-08-20

## 2024-08-20 RX ADMIN — DIVALPROEX SODIUM 500 MG: 500 TABLET, DELAYED RELEASE ORAL at 08:06

## 2024-08-20 ASSESSMENT — PAIN SCALES - GENERAL: PAINLEVEL_OUTOF10: 0

## 2024-08-20 NOTE — GROUP NOTE
Group Therapy Note    Date: 8/19/2024    Group Start Time: 1945  Group End Time: 2030  Group Topic: Recreational    [facility]    Idalmis Wilks        Group Therapy Note    Attendees: 7/8     Recreational Therapist facilitated structured leisure skills group to introduce healthy leisure skills as positive way to cope and manage mood.        Patient's Goal:  Client will learn and demonstrate effective coping skills     Notes:  Attended group and listened to songs with peers.  Pt was receptive to intervention and responded to prompts from staff. Attended entire session and was attentive during group.         Status After Intervention:  Improved    Participation Level: Active Listener    Participation Quality: Appropriate      Speech:  normal      Thought Process/Content: Logical      Affective Functioning: Congruent      Mood:  calm       Level of consciousness:  Alert      Response to Learning: Progressing to goal      Endings: None Reported    Modes of Intervention: Activity      Discipline Responsible: Recreational Therapist      Signature:  CESIA Partida

## 2024-08-20 NOTE — PLAN OF CARE
Problem: Anxiety  Goal: Will report anxiety at manageable levels  Description: INTERVENTIONS:  1. Administer medication as ordered  2. Teach and rehearse alternative coping skills  3. Provide emotional support with 1:1 interaction with staff  8/15/2024 2003 by Daniel Carreon RN  Outcome: Progressing  8/15/2024 1429 by Iva Dia RN  Outcome: Progressing     Problem: Coping  Goal: Pt/Family able to verbalize concerns and demonstrate effective coping strategies  Description: INTERVENTIONS:  1. Assist patient/family to identify coping skills, available support systems and cultural and spiritual values  2. Provide emotional support, including active listening and acknowledgement of concerns of patient and caregivers  3. Reduce environmental stimuli, as able  4. Instruct patient/family in relaxation techniques, as appropriate  5. Assess for spiritual pain/suffering and initiate Spiritual Care, Psychosocial Clinical Specialist consults as needed  8/15/2024 2003 by Daniel Carreon RN  Outcome: Progressing  8/15/2024 1429 by Iva Dia, RN  Outcome: Progressing     
  Problem: Anxiety  Goal: Will report anxiety at manageable levels  Description: INTERVENTIONS:  1. Administer medication as ordered  2. Teach and rehearse alternative coping skills  3. Provide emotional support with 1:1 interaction with staff  8/18/2024 1021 by Ashley Ramirez RN  Outcome: Progressing  8/17/2024 2145 by Daniel Carreon RN  Outcome: Progressing     Problem: Coping  Goal: Pt/Family able to verbalize concerns and demonstrate effective coping strategies  Description: INTERVENTIONS:  1. Assist patient/family to identify coping skills, available support systems and cultural and spiritual values  2. Provide emotional support, including active listening and acknowledgement of concerns of patient and caregivers  3. Reduce environmental stimuli, as able  4. Instruct patient/family in relaxation techniques, as appropriate  5. Assess for spiritual pain/suffering and initiate Spiritual Care, Psychosocial Clinical Specialist consults as needed  8/18/2024 1021 by Ashley Ramirez RN  Outcome: Progressing  8/17/2024 2145 by Daniel Carreon RN  Outcome: Progressing     Problem: Depression/Self Harm  Goal: Effect of psychiatric condition will be minimized and patient will be protected from self harm  Description: INTERVENTIONS:  1. Assess impact of patient's symptoms on level of functioning, self care needs and offer support as indicated  2. Assess patient/family knowledge of depression, impact on illness and need for teaching  3. Provide emotional support, presence and reassurance  4. Assess for possible suicidal thoughts or ideation. If patient expresses suicidal thoughts or statements do not leave alone, initiate Suicide Precautions, move to a room close to the nursing station and obtain sitter  5. Initiate consults as appropriate with Mental Health Professional, Spiritual Care, Psychosocial CNS, and consider a recommendation to the [facility] for a Psychiatric Consultation  Outcome: Progressing     
  Problem: Anxiety  Goal: Will report anxiety at manageable levels  Description: INTERVENTIONS:  1. Administer medication as ordered  2. Teach and rehearse alternative coping skills  3. Provide emotional support with 1:1 interaction with staff  8/18/2024 2217 by Daniel Carreon RN  Outcome: Progressing  8/18/2024 1021 by Ashley Ramirez RN  Outcome: Progressing     Problem: Coping  Goal: Pt/Family able to verbalize concerns and demonstrate effective coping strategies  Description: INTERVENTIONS:  1. Assist patient/family to identify coping skills, available support systems and cultural and spiritual values  2. Provide emotional support, including active listening and acknowledgement of concerns of patient and caregivers  3. Reduce environmental stimuli, as able  4. Instruct patient/family in relaxation techniques, as appropriate  5. Assess for spiritual pain/suffering and initiate Spiritual Care, Psychosocial Clinical Specialist consults as needed  8/18/2024 2217 by Daniel Carreon RN  Outcome: Progressing  8/18/2024 1021 by Ashley Ramirez RN  Outcome: Progressing     
  Problem: Anxiety  Goal: Will report anxiety at manageable levels  Description: INTERVENTIONS:  1. Administer medication as ordered  2. Teach and rehearse alternative coping skills  3. Provide emotional support with 1:1 interaction with staff  8/19/2024 1026 by Norah Rodriguez RN  Outcome: Not Progressing  8/18/2024 2217 by Daniel Carreon RN  Outcome: Progressing     Problem: Coping  Goal: Pt/Family able to verbalize concerns and demonstrate effective coping strategies  Description: INTERVENTIONS:  1. Assist patient/family to identify coping skills, available support systems and cultural and spiritual values  2. Provide emotional support, including active listening and acknowledgement of concerns of patient and caregivers  3. Reduce environmental stimuli, as able  4. Instruct patient/family in relaxation techniques, as appropriate  5. Assess for spiritual pain/suffering and initiate Spiritual Care, Psychosocial Clinical Specialist consults as needed  8/19/2024 1026 by Norah Rodriguez RN  Outcome: Not Progressing  8/18/2024 2217 by Daniel Carreon RN  Outcome: Progressing     Problem: Decision Making  Goal: Pt/Family able to effectively weigh alternatives and participate in decision making related to treatment and care  Description: INTERVENTIONS:  1. Determine when there are differences between patient's view, family's view, and healthcare provider's view of condition  2. Facilitate patient and family articulation of goals for care  3. Help patient and family identify pros/cons of alternative solutions  4. Provide information as requested by patient/family  5. Respect patient/family right to receive or not to receive information  6. Serve as a liaison between patient and family and health care team  7. Initiate Consults from Ethics, Palliative Care or initiate Family Care Conference as is appropriate  Outcome: Not Progressing     Problem: Depression/Self Harm  Goal: Effect of psychiatric condition will 
  Problem: Anxiety  Goal: Will report anxiety at manageable levels  Description: INTERVENTIONS:  1. Administer medication as ordered  2. Teach and rehearse alternative coping skills  3. Provide emotional support with 1:1 interaction with staff  Outcome: Progressing     Problem: Coping  Goal: Pt/Family able to verbalize concerns and demonstrate effective coping strategies  Description: INTERVENTIONS:  1. Assist patient/family to identify coping skills, available support systems and cultural and spiritual values  2. Provide emotional support, including active listening and acknowledgement of concerns of patient and caregivers  3. Reduce environmental stimuli, as able  4. Instruct patient/family in relaxation techniques, as appropriate  5. Assess for spiritual pain/suffering and initiate Spiritual Care, Psychosocial Clinical Specialist consults as needed  Outcome: Progressing     Problem: Decision Making  Goal: Pt/Family able to effectively weigh alternatives and participate in decision making related to treatment and care  Description: INTERVENTIONS:  1. Determine when there are differences between patient's view, family's view, and healthcare provider's view of condition  2. Facilitate patient and family articulation of goals for care  3. Help patient and family identify pros/cons of alternative solutions  4. Provide information as requested by patient/family  5. Respect patient/family right to receive or not to receive information  6. Serve as a liaison between patient and family and health care team  7. Initiate Consults from Ethics, Palliative Care or initiate Family Care Conference as is appropriate  8/14/2024 1744 by Erendira Barboza, RN  Outcome: Progressing  8/14/2024 1743 by Erendira Barboza, RN  Outcome: Progressing     Problem: Behavior  Goal: Pt/Family maintain appropriate behavior and adhere to behavioral management agreement, if implemented  Description: INTERVENTIONS:  1. Assess 
  Problem: Anxiety  Goal: Will report anxiety at manageable levels  Description: INTERVENTIONS:  1. Administer medication as ordered  2. Teach and rehearse alternative coping skills  3. Provide emotional support with 1:1 interaction with staff  Outcome: Progressing     Problem: Coping  Goal: Pt/Family able to verbalize concerns and demonstrate effective coping strategies  Description: INTERVENTIONS:  1. Assist patient/family to identify coping skills, available support systems and cultural and spiritual values  2. Provide emotional support, including active listening and acknowledgement of concerns of patient and caregivers  3. Reduce environmental stimuli, as able  4. Instruct patient/family in relaxation techniques, as appropriate  5. Assess for spiritual pain/suffering and initiate Spiritual Care, Psychosocial Clinical Specialist consults as needed  Outcome: Progressing     Problem: Decision Making  Goal: Pt/Family able to effectively weigh alternatives and participate in decision making related to treatment and care  Description: INTERVENTIONS:  1. Determine when there are differences between patient's view, family's view, and healthcare provider's view of condition  2. Facilitate patient and family articulation of goals for care  3. Help patient and family identify pros/cons of alternative solutions  4. Provide information as requested by patient/family  5. Respect patient/family right to receive or not to receive information  6. Serve as a liaison between patient and family and health care team  7. Initiate Consults from Ethics, Palliative Care or initiate Family Care Conference as is appropriate  Outcome: Progressing     
  Problem: Discharge Planning  Goal: Discharge to home or other facility with appropriate resources  Outcome: Progressing     Problem: Anxiety  Goal: Will report anxiety at manageable levels  Description: INTERVENTIONS:  1. Administer medication as ordered  2. Teach and rehearse alternative coping skills  3. Provide emotional support with 1:1 interaction with staff  8/15/2024 0001 by Jenny Gonzalez RN  Outcome: Progressing  8/14/2024 1743 by Erendira Barboza RN  Outcome: Progressing     Problem: Coping  Goal: Pt/Family able to verbalize concerns and demonstrate effective coping strategies  Description: INTERVENTIONS:  1. Assist patient/family to identify coping skills, available support systems and cultural and spiritual values  2. Provide emotional support, including active listening and acknowledgement of concerns of patient and caregivers  3. Reduce environmental stimuli, as able  4. Instruct patient/family in relaxation techniques, as appropriate  5. Assess for spiritual pain/suffering and initiate Spiritual Care, Psychosocial Clinical Specialist consults as needed  8/15/2024 0001 by Jenny Gonzalez RN  Outcome: Progressing  8/14/2024 1743 by Erendira Barboza RN  Outcome: Progressing     Problem: Decision Making  Goal: Pt/Family able to effectively weigh alternatives and participate in decision making related to treatment and care  Description: INTERVENTIONS:  1. Determine when there are differences between patient's view, family's view, and healthcare provider's view of condition  2. Facilitate patient and family articulation of goals for care  3. Help patient and family identify pros/cons of alternative solutions  4. Provide information as requested by patient/family  5. Respect patient/family right to receive or not to receive information  6. Serve as a liaison between patient and family and health care team  7. Initiate Consults from Ethics, Palliative Care or initiate Family Care 
  Problem: Discharge Planning  Goal: Discharge to home or other facility with appropriate resources  Outcome: Progressing     Problem: Anxiety  Goal: Will report anxiety at manageable levels  Description: INTERVENTIONS:  1. Administer medication as ordered  2. Teach and rehearse alternative coping skills  3. Provide emotional support with 1:1 interaction with staff  8/20/2024 0899 by Hilda Delgadillo, RN  Outcome: Progressing  8/19/2024 2157 by Daniel Carreon RN  Outcome: Progressing     Problem: Coping  Goal: Pt/Family able to verbalize concerns and demonstrate effective coping strategies  Description: INTERVENTIONS:  1. Assist patient/family to identify coping skills, available support systems and cultural and spiritual values  2. Provide emotional support, including active listening and acknowledgement of concerns of patient and caregivers  3. Reduce environmental stimuli, as able  4. Instruct patient/family in relaxation techniques, as appropriate  5. Assess for spiritual pain/suffering and initiate Spiritual Care, Psychosocial Clinical Specialist consults as needed  Outcome: Progressing     Problem: Decision Making  Goal: Pt/Family able to effectively weigh alternatives and participate in decision making related to treatment and care  Description: INTERVENTIONS:  1. Determine when there are differences between patient's view, family's view, and healthcare provider's view of condition  2. Facilitate patient and family articulation of goals for care  3. Help patient and family identify pros/cons of alternative solutions  4. Provide information as requested by patient/family  5. Respect patient/family right to receive or not to receive information  6. Serve as a liaison between patient and family and health care team  7. Initiate Consults from Ethics, Palliative Care or initiate Family Care Conference as is appropriate  Outcome: Progressing     Problem: Behavior  Goal: Pt/Family maintain appropriate behavior and 
  Problem: Discharge Planning  Goal: Discharge to home or other facility with appropriate resources  Outcome: Progressing     Problem: Anxiety  Goal: Will report anxiety at manageable levels  Description: INTERVENTIONS:  1. Administer medication as ordered  2. Teach and rehearse alternative coping skills  3. Provide emotional support with 1:1 interaction with staff  Outcome: Progressing     Problem: Coping  Goal: Pt/Family able to verbalize concerns and demonstrate effective coping strategies  Description: INTERVENTIONS:  1. Assist patient/family to identify coping skills, available support systems and cultural and spiritual values  2. Provide emotional support, including active listening and acknowledgement of concerns of patient and caregivers  3. Reduce environmental stimuli, as able  4. Instruct patient/family in relaxation techniques, as appropriate  5. Assess for spiritual pain/suffering and initiate Spiritual Care, Psychosocial Clinical Specialist consults as needed  Outcome: Progressing     Problem: Decision Making  Goal: Pt/Family able to effectively weigh alternatives and participate in decision making related to treatment and care  Description: INTERVENTIONS:  1. Determine when there are differences between patient's view, family's view, and healthcare provider's view of condition  2. Facilitate patient and family articulation of goals for care  3. Help patient and family identify pros/cons of alternative solutions  4. Provide information as requested by patient/family  5. Respect patient/family right to receive or not to receive information  6. Serve as a liaison between patient and family and health care team  7. Initiate Consults from Ethics, Palliative Care or initiate Family Care Conference as is appropriate  Outcome: Progressing     Problem: Behavior  Goal: Pt/Family maintain appropriate behavior and adhere to behavioral management agreement, if implemented  Description: INTERVENTIONS:  1. Assess 
between patient's view, family's view, and healthcare provider's view of condition  2. Facilitate patient and family articulation of goals for care  3. Help patient and family identify pros/cons of alternative solutions  4. Provide information as requested by patient/family  5. Respect patient/family right to receive or not to receive information  6. Serve as a liaison between patient and family and health care team  7. Initiate Consults from Ethics, Palliative Care or initiate Family Care Conference as is appropriate  8/17/2024 1354 by Ashley Ramirez, RN  Outcome: Not Progressing     
receive or not to receive information  6. Serve as a liaison between patient and family and health care team  7. Initiate Consults from Ethics, Palliative Care or initiate Family Care Conference as is appropriate  8/19/2024 1026 by Norah Rodriguez RN  Outcome: Not Progressing     Problem: Depression/Self Harm  Goal: Effect of psychiatric condition will be minimized and patient will be protected from self harm  Description: INTERVENTIONS:  1. Assess impact of patient's symptoms on level of functioning, self care needs and offer support as indicated  2. Assess patient/family knowledge of depression, impact on illness and need for teaching  3. Provide emotional support, presence and reassurance  4. Assess for possible suicidal thoughts or ideation. If patient expresses suicidal thoughts or statements do not leave alone, initiate Suicide Precautions, move to a room close to the nursing station and obtain sitter  5. Initiate consults as appropriate with Mental Health Professional, Spiritual Care, Psychosocial CNS, and consider a recommendation to the [facility] for a Psychiatric Consultation  8/19/2024 1026 by Norah Rodriguez, RN  Outcome: Not Progressing     
Progressing  8/17/2024 0304 by Jenny Gonzalez RN  Outcome: Progressing     Problem: Coping  Goal: Pt/Family able to verbalize concerns and demonstrate effective coping strategies  Description: INTERVENTIONS:  1. Assist patient/family to identify coping skills, available support systems and cultural and spiritual values  2. Provide emotional support, including active listening and acknowledgement of concerns of patient and caregivers  3. Reduce environmental stimuli, as able  4. Instruct patient/family in relaxation techniques, as appropriate  5. Assess for spiritual pain/suffering and initiate Spiritual Care, Psychosocial Clinical Specialist consults as needed  8/17/2024 1354 by Ashley Ramirez RN  Outcome: Not Progressing  8/17/2024 0304 by Jenny Gonzalez RN  Outcome: Progressing     Problem: Decision Making  Goal: Pt/Family able to effectively weigh alternatives and participate in decision making related to treatment and care  Description: INTERVENTIONS:  1. Determine when there are differences between patient's view, family's view, and healthcare provider's view of condition  2. Facilitate patient and family articulation of goals for care  3. Help patient and family identify pros/cons of alternative solutions  4. Provide information as requested by patient/family  5. Respect patient/family right to receive or not to receive information  6. Serve as a liaison between patient and family and health care team  7. Initiate Consults from Ethics, Palliative Care or initiate Family Care Conference as is appropriate  8/17/2024 1354 by Ashley Ramirez RN  Outcome: Not Progressing  8/17/2024 0304 by Jenny Gonzalez RN  Outcome: Progressing

## 2024-08-20 NOTE — PROGRESS NOTES
PSYCHIATRIC PROGRESS NOTE         Patient Name  Hasmukh Paige   Date of Birth 1995   [facility] [id number]   Medical Record Number  [id number]      Age  29 y.o.   PCP Ailin Tuttle FNP   Admit date:  8/14/2024    Room Number  241/01   [facility]   Date of Service  8/16/2024            HISTORY OF PRESENT ILLNESS/INTERVAL HISTORY:              MENTAL STATUS EXAM & VITALS                    VITALS:     Patient Vitals for the past 24 hrs:   Temp Pulse Resp BP SpO2   08/16/24 2102 -- -- -- 130/74 --   08/16/24 1919 97.7 °F (36.5 °C) 92 18 130/74 96 %   08/16/24 1617 98.2 °F (36.8 °C) 74 -- 138/88 98 %   08/16/24 0722 97.5 °F (36.4 °C) 68 20 114/76 98 %     Wt Readings from Last 3 Encounters:   08/14/24 81.6 kg (180 lb)   11/12/23 88 kg (194 lb 0.1 oz)   09/08/23 85.7 kg (189 lb)     Temp Readings from Last 3 Encounters:   08/16/24 97.7 °F (36.5 °C) (Oral)   03/04/24 97.6 °F (36.4 °C) (Oral)   11/14/23 97.5 °F (36.4 °C) (Oral)     BP Readings from Last 3 Encounters:   08/16/24 130/74   03/04/24 127/85   11/14/23 138/84     Pulse Readings from Last 3 Encounters:   08/16/24 92   03/04/24 72   11/14/23 83            DATA     LABORATORY DATA:(reviewed/updated 8/16/2024)  No results found for this or any previous visit (from the past 24 hour(s)).   Lab Results   Component Value Date/Time    VALAC 93 03/01/2024 10:04 PM    VALP 20 09/21/2019 04:11 AM     No results found for: \"LITHM\"   RADIOLOGY REPORTS:(reviewed/updated 8/16/2024)  No results found.       MEDICATIONS     ALL MEDICATIONS:   Current Facility-Administered Medications   Medication Dose Route Frequency    ziprasidone (GEODON) injection 20 mg  20 mg IntraMUSCular Q12H PRN    ziprasidone (GEODON) capsule 20 mg  20 mg Oral Q12H PRN    acetaminophen (TYLENOL) tablet 650 mg  650 mg Oral Q4H PRN    hydrOXYzine HCl (ATARAX) tablet 50 mg  50 mg Oral TID PRN    traZODone (DESYREL) tablet 50 mg  50 mg Oral Nightly PRN    magnesium hydroxide (MILK OF MAGNESIA) 
PSYCHIATRIC PROGRESS NOTE         Patient Name  Hasmukh Paige   Date of Birth 1995   [facility] [id number]   Medical Record Number  [id number]      Age  29 y.o.   PCP Ailin Tuttle FNP   Admit date:  8/14/2024    Room Number  241/01   [facility]   Date of Service  8/17/2024            HISTORY OF PRESENT ILLNESS/INTERVAL HISTORY:              MENTAL STATUS EXAM & VITALS                    VITALS:     Patient Vitals for the past 24 hrs:   Temp Pulse Resp BP SpO2   08/17/24 1914 97.5 °F (36.4 °C) 86 18 121/85 --   08/17/24 0841 97 °F (36.1 °C) 70 18 (!) 143/97 --   08/17/24 0731 97 °F (36.1 °C) 70 18 (!) 143/97 95 %     Wt Readings from Last 3 Encounters:   08/14/24 81.6 kg (180 lb)   11/12/23 88 kg (194 lb 0.1 oz)   09/08/23 85.7 kg (189 lb)     Temp Readings from Last 3 Encounters:   08/17/24 97.5 °F (36.4 °C) (Oral)   03/04/24 97.6 °F (36.4 °C) (Oral)   11/14/23 97.5 °F (36.4 °C) (Oral)     BP Readings from Last 3 Encounters:   08/17/24 121/85   03/04/24 127/85   11/14/23 138/84     Pulse Readings from Last 3 Encounters:   08/17/24 86   03/04/24 72   11/14/23 83            DATA     LABORATORY DATA:(reviewed/updated 8/17/2024)  No results found for this or any previous visit (from the past 24 hour(s)).   Lab Results   Component Value Date/Time    VALAC 93 03/01/2024 10:04 PM    VALP 20 09/21/2019 04:11 AM     No results found for: \"LITHM\"   RADIOLOGY REPORTS:(reviewed/updated 8/17/2024)  No results found.       MEDICATIONS     ALL MEDICATIONS:   Current Facility-Administered Medications   Medication Dose Route Frequency    ziprasidone (GEODON) injection 20 mg  20 mg IntraMUSCular Q12H PRN    ziprasidone (GEODON) capsule 20 mg  20 mg Oral Q12H PRN    acetaminophen (TYLENOL) tablet 650 mg  650 mg Oral Q4H PRN    hydrOXYzine HCl (ATARAX) tablet 50 mg  50 mg Oral TID PRN    traZODone (DESYREL) tablet 50 mg  50 mg Oral Nightly PRN    magnesium hydroxide (MILK OF MAGNESIA) 400 MG/5ML suspension 30 mL  30 mL 
Progress Note  Date:2024       Room:[facility]  Patient Name:Hasmukh Paige     YOB: 1995     Age:29 y.o.      *ATTENTION:  This note has been created by a medical student for educational purposes only.  Please do not refer to the content of this note for clinical decision-making, billing, or other purposes.  Please see attending physician’s note to obtain clinical information on this patient.*            Subjective    Subjective     Pt appeared calm and cooperative. Pt stated that he wanted to wear his earrings so that his \"piercing does not close up.\" Pt stated that the left side of his abdomen was sore. Pt claims to be eating and sleeping well. No active SI/HI/AH mentioned.    Pt stated that he talked to his mother and stated that his mother was upset that he was cutting himself again. Pt stated that he had a nightmare yesterday and saw a person at the door. Pt stated that he was taking his prazosin.     Review of Systems  Objective         Vitals Last 24 Hours:  TEMPERATURE:  Temp  Av.5 °F (36.4 °C)  Min: 97 °F (36.1 °C)  Max: 98.2 °F (36.8 °C)  RESPIRATIONS RANGE: Resp  Av  Min: 18  Max: 18  PULSE OXIMETRY RANGE: SpO2  Av.3 %  Min: 95 %  Max: 98 %  PULSE RANGE: Pulse  Av.5  Min: 70  Max: 92  BLOOD PRESSURE RANGE: Systolic (24hrs), Av , Min:130 , Max:143   ; Diastolic (24hrs), Av, Min:74, Max:97    I/O (24Hr):  No intake or output data in the 24 hours ending 24 1616  Objective  Labs/Imaging/Diagnostics    Labs:  CBC:No results for input(s): \"WBC\", \"RBC\", \"HGB\", \"HCT\", \"MCV\", \"RDW\", \"PLT\" in the last 72 hours.  CHEMISTRIES:No results for input(s): \"NA\", \"K\", \"CL\", \"CO2\", \"BUN\", \"CREATININE\", \"GLUCOSE\", \"PHOS\", \"MG\" in the last 72 hours.    Invalid input(s): \"CA\"  PT/INR:No results for input(s): \"PROTIME\", \"INR\" in the last 72 hours.  APTT:No results for input(s): \"APTT\" in the last 72 hours.  LIVER PROFILE:No results for input(s): \"AST\", \"ALT\", \"BILIDIR\", 
Spiritual Health Assessment/Progress Note  [facility]    Initial Encounter,  ,  ,      Name: Hasmukh Paige MRN: [id number]    Age: 29 y.o.     Sex: male   Language: English   Mandaen: None   Idiopathic mild intellectual disability     Date: 8/20/2024            Total Time Calculated: 10 min              Spiritual Assessment began in [facility] 2 [facility] ACUTE        Referral/Consult From: Rounding   Encounter Overview/Reason: Initial Encounter  Service Provided For: Patient    Yaquelin, Belief, Meaning:   Patient Other: Shares he is agnostic  Family/Friends No family/friends present      Importance and Influence:  Patient Other: Motivated by friendships for social support  Family/Friends no family/friends present    Community:  Patient feels well-supported. Support system includes: Friends  Family/Friends Other: none    Assessment and Plan of Care:     Patient Interventions include: Facilitated expression of thoughts and feelings, Affirmed coping skills/support systems, and Engaged in life review and/or legacy  Family/Friends Interventions include: Other: none    Patient Plan of Care: Spiritual Care available upon further referral  Family/Friends Plan of Care: Other: none     met with Hasmukh while rounding on 2S. He shared he is coping well at the time. He is looking forward to leaving soon. He appeared to be in good spirits at the time and shared about his love of anime (tv shows and graphic novels). He shared he is not Rastafari/agnostic. He finds support through friendships with others.  provided an active listening presence, spiritual assessment, dialogue and encouragement.     Electronically signed by HANY DOSHI on 8/20/2024 at 1:06 PM    
(ZYPREXA) tablet 20 mg  20 mg Oral Nightly    prazosin (MINIPRESS) capsule 2 mg  2 mg Oral Nightly      SCHEDULED MEDICATIONS:    divalproex  500 mg Oral BID    OLANZapine  20 mg Oral Nightly    prazosin  2 mg Oral Nightly           A denied SSESSMENT & PLAN   Progress note for August 19, 2024 patient seen for follow-up  Continue close observation  Discussed meds  Provided support, psycho edu  Discussed with staff in the treatment team, the progress made in therapy, psychosocial needs and nursing concerns.    The following regarding medications was addressed during rounds with patient:   the risks and benefits of the proposed medication.   The patient was given the opportunity to ask questions.   Informed consent given to the use of the above medications.     Obtain psychiatric records from previous [facility] hospitals to further elucidate the nature of patient's psychopathology and review once available.    Gather additional collateral information from friends, family and o/p treatment team to further elucidate the nature of patient's psychopathology and baselline level of psychiatric functioning.         I certify that this patient's inpatient psychiatric hospital services continue to be, required for treatment that could reasonably be expected to improve the patient's condition and that the patient continues to need, on a daily basis, active treatment furnished directly by or requiring the supervision of inpatient psychiatric facility personnel. In addition, the hospital records show that services furnished were intensive treatment services.    Signed By:   Bartolome Cordoba MD  8/19/2024            
20 mg  20 mg Oral Nightly    prazosin (MINIPRESS) capsule 2 mg  2 mg Oral Nightly      SCHEDULED MEDICATIONS:    divalproex  500 mg Oral BID    OLANZapine  20 mg Oral Nightly    prazosin  2 mg Oral Nightly           ASSESSMENT & PLAN     Continue close observation  Discussed meds  Provided support, psycho edu  Discussed with staff in the treatment team, the progress made in therapy, psychosocial needs and nursing concerns.    The following regarding medications was addressed during rounds with patient:   the risks and benefits of the proposed medication.   The patient was given the opportunity to ask questions.   Informed consent given to the use of the above medications.     Obtain psychiatric records from previous [facility] hospitals to further elucidate the nature of patient's psychopathology and review once available.    Gather additional collateral information from friends, family and o/p treatment team to further elucidate the nature of patient's psychopathology and baselline level of psychiatric functioning.         I certify that this patient's inpatient psychiatric hospital services continue to be, required for treatment that could reasonably be expected to improve the patient's condition and that the patient continues to need, on a daily basis, active treatment furnished directly by or requiring the supervision of inpatient psychiatric facility personnel. In addition, the hospital records show that services furnished were intensive treatment services.    Signed By:   Bartolome Cordoba MD  8/19/2024            
tablet 500 mg  500 mg Oral BID    OLANZapine (ZYPREXA) tablet 20 mg  20 mg Oral Nightly    prazosin (MINIPRESS) capsule 2 mg  2 mg Oral Nightly      SCHEDULED MEDICATIONS:    divalproex  500 mg Oral BID    OLANZapine  20 mg Oral Nightly    prazosin  2 mg Oral Nightly           ASSESSMENT & PLAN     Progress note for August 19, 2024 patient seen for follow-up chart reviewed discussing the treatment team weekend events.  Thomas remains anxious and attention seeking somewhat frustrated and angry that appears talking loud when he was trying to talk to his mom he is also he is happy that he has a new roommate he does want his he is here gauges to prevent his years holds closing up he says he will not homicidal.  And approved having his ear gauges.  Also asking for discharge when he will be discussing the treatment team not suicidal not homicidal and not made an attempt to scratch himself no auditory or visual hallucinations no side effect from medications sleeping well and eating well and reassess the case tomorrow and discussed with the group home need inpatient level of care for not further stabilization but make appropriate safe discharge planning thank you      I certify that this patient's inpatient psychiatric hospital services continue to be, required for treatment that could reasonably be expected to improve the patient's condition and that the patient continues to need, on a daily basis, active treatment furnished directly by or requiring the supervision of inpatient psychiatric facility personnel. In addition, the hospital records show that services furnished were intensive treatment services.    Signed By:   Bartolome Cordoba MD  8/20/2024
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
## (undated) DEVICE — INFECTION CONTROL KIT SYS

## (undated) DEVICE — SYR 10ML LUER LOK 1/5ML GRAD --

## (undated) DEVICE — SURGICAL PROCEDURE PACK BASIN MAJ SET CUST NO CAUT

## (undated) DEVICE — DEVON™ KNEE AND BODY STRAP 60" X 3" (1.5 M X 7.6 CM): Brand: DEVON

## (undated) DEVICE — STERILE POLYISOPRENE POWDER-FREE SURGICAL GLOVES WITH EMOLLIENT COATING: Brand: PROTEXIS

## (undated) DEVICE — BLADE ASSEMB CLP HAIR FINE --

## (undated) DEVICE — APPLICATOR BNDG 1MM ADH PREMIERPRO EXOFIN

## (undated) DEVICE — REM POLYHESIVE ADULT PATIENT RETURN ELECTRODE: Brand: VALLEYLAB

## (undated) DEVICE — ROCKER SWITCH PENCIL BLADE ELECTRODE, HOLSTER: Brand: EDGE

## (undated) DEVICE — SUTURE MCRYL SZ 4-0 L27IN ABSRB UD L19MM PS-2 1/2 CIR PRIM Y426H

## (undated) DEVICE — SOLUTION IV 1000ML 0.9% SOD CHL

## (undated) DEVICE — DBD-PACK,LAPAROTOMY,2 REINFORCED GOWNS: Brand: MEDLINE

## (undated) DEVICE — DRAPE,UTILTY,TAPE,15X26, 4EA/PK: Brand: MEDLINE

## (undated) DEVICE — INTENDED FOR TISSUE SEPARATION, AND OTHER PROCEDURES THAT REQUIRE A SHARP SURGICAL BLADE TO PUNCTURE OR CUT.: Brand: BARD-PARKER ® CARBON RIB-BACK BLADES

## (undated) DEVICE — NEEDLE HYPO 25GA L1.5IN BVL ORIENTED ECLIPSE

## (undated) DEVICE — SUTURE VCRL SZ 3-0 L27IN ABSRB UD L26MM SH 1/2 CIR J416H

## (undated) DEVICE — (D)PREP SKN CHLRAPRP APPL 26ML -- CONVERT TO ITEM 371833